# Patient Record
Sex: MALE | Race: WHITE | Employment: OTHER | URBAN - METROPOLITAN AREA
[De-identification: names, ages, dates, MRNs, and addresses within clinical notes are randomized per-mention and may not be internally consistent; named-entity substitution may affect disease eponyms.]

---

## 2017-01-05 ENCOUNTER — OFFICE VISIT (OUTPATIENT)
Dept: ORTHOPEDIC SURGERY | Age: 57
End: 2017-01-05

## 2017-01-05 VITALS — HEIGHT: 73 IN | WEIGHT: 240 LBS | BODY MASS INDEX: 31.81 KG/M2

## 2017-01-05 DIAGNOSIS — M50.20 CERVICAL DISC HERNIATION: ICD-10-CM

## 2017-01-05 DIAGNOSIS — M25.511 ACUTE PAIN OF RIGHT SHOULDER: ICD-10-CM

## 2017-01-05 DIAGNOSIS — M50.123 CERVICAL DISC DISORDER AT C6-C7 LEVEL WITH RADICULOPATHY: ICD-10-CM

## 2017-01-05 DIAGNOSIS — S16.1XXA CERVICAL STRAIN, ACUTE, INITIAL ENCOUNTER: ICD-10-CM

## 2017-01-05 PROCEDURE — 99213 OFFICE O/P EST LOW 20 MIN: CPT | Performed by: INTERNAL MEDICINE

## 2017-01-05 RX ORDER — GABAPENTIN 300 MG/1
CAPSULE ORAL
Qty: 60 CAPSULE | Refills: 3 | Status: SHIPPED | OUTPATIENT
Start: 2017-01-05 | End: 2017-02-16 | Stop reason: SDUPTHER

## 2017-01-05 RX ORDER — TRAMADOL HYDROCHLORIDE 50 MG/1
TABLET ORAL
Qty: 50 TABLET | Refills: 0 | Status: ON HOLD | OUTPATIENT
Start: 2017-01-05 | End: 2018-01-17 | Stop reason: HOSPADM

## 2017-01-26 ENCOUNTER — OFFICE VISIT (OUTPATIENT)
Dept: ORTHOPEDIC SURGERY | Age: 57
End: 2017-01-26

## 2017-01-26 VITALS — WEIGHT: 240 LBS | HEIGHT: 73 IN | BODY MASS INDEX: 31.81 KG/M2

## 2017-01-26 DIAGNOSIS — S16.1XXA CERVICAL STRAIN, ACUTE, INITIAL ENCOUNTER: ICD-10-CM

## 2017-01-26 DIAGNOSIS — M50.20 CERVICAL DISC HERNIATION: ICD-10-CM

## 2017-01-26 DIAGNOSIS — M50.123 CERVICAL DISC DISORDER AT C6-C7 LEVEL WITH RADICULOPATHY: ICD-10-CM

## 2017-01-26 DIAGNOSIS — M25.511 ACUTE PAIN OF RIGHT SHOULDER: ICD-10-CM

## 2017-01-26 DIAGNOSIS — S46.911A RIGHT SHOULDER STRAIN, INITIAL ENCOUNTER: ICD-10-CM

## 2017-01-26 PROCEDURE — 99213 OFFICE O/P EST LOW 20 MIN: CPT | Performed by: INTERNAL MEDICINE

## 2017-01-26 RX ORDER — TIZANIDINE 4 MG/1
4 TABLET ORAL 3 TIMES DAILY PRN
Qty: 30 TABLET | Refills: 1 | Status: ON HOLD | OUTPATIENT
Start: 2017-01-26 | End: 2018-01-17 | Stop reason: HOSPADM

## 2017-01-26 RX ORDER — MELOXICAM 15 MG/1
15 TABLET ORAL DAILY
Qty: 30 TABLET | Refills: 1 | Status: SHIPPED | OUTPATIENT
Start: 2017-01-26 | End: 2017-06-29 | Stop reason: SDUPTHER

## 2017-01-30 ENCOUNTER — TELEPHONE (OUTPATIENT)
Dept: ORTHOPEDIC SURGERY | Age: 57
End: 2017-01-30

## 2017-02-06 ENCOUNTER — TELEPHONE (OUTPATIENT)
Dept: ORTHOPEDIC SURGERY | Age: 57
End: 2017-02-06

## 2017-02-16 ENCOUNTER — OFFICE VISIT (OUTPATIENT)
Dept: ORTHOPEDIC SURGERY | Age: 57
End: 2017-02-16

## 2017-02-16 VITALS — HEIGHT: 72 IN | WEIGHT: 240 LBS | BODY MASS INDEX: 32.51 KG/M2

## 2017-02-16 DIAGNOSIS — S46.911D RIGHT SHOULDER STRAIN, SUBSEQUENT ENCOUNTER: ICD-10-CM

## 2017-02-16 DIAGNOSIS — M50.123 CERVICAL DISC DISORDER AT C6-C7 LEVEL WITH RADICULOPATHY: ICD-10-CM

## 2017-02-16 DIAGNOSIS — S16.1XXD CERVICAL STRAIN, ACUTE, SUBSEQUENT ENCOUNTER: Primary | ICD-10-CM

## 2017-02-16 DIAGNOSIS — M50.20 CERVICAL DISC HERNIATION: ICD-10-CM

## 2017-02-16 PROCEDURE — 99213 OFFICE O/P EST LOW 20 MIN: CPT | Performed by: INTERNAL MEDICINE

## 2017-02-16 RX ORDER — GABAPENTIN 300 MG/1
CAPSULE ORAL
Qty: 30 CAPSULE | Refills: 2 | Status: SHIPPED | OUTPATIENT
Start: 2017-02-16 | End: 2017-05-11 | Stop reason: SDUPTHER

## 2017-02-22 ENCOUNTER — OFFICE VISIT (OUTPATIENT)
Dept: ORTHOPEDIC SURGERY | Age: 57
End: 2017-02-22

## 2017-02-22 VITALS
SYSTOLIC BLOOD PRESSURE: 132 MMHG | BODY MASS INDEX: 32.51 KG/M2 | HEIGHT: 72 IN | HEART RATE: 68 BPM | DIASTOLIC BLOOD PRESSURE: 75 MMHG | WEIGHT: 240 LBS

## 2017-02-22 DIAGNOSIS — M17.12 ARTHRITIS OF LEFT KNEE: Primary | ICD-10-CM

## 2017-02-22 PROCEDURE — 99213 OFFICE O/P EST LOW 20 MIN: CPT | Performed by: ORTHOPAEDIC SURGERY

## 2017-03-16 ENCOUNTER — OFFICE VISIT (OUTPATIENT)
Dept: ORTHOPEDIC SURGERY | Age: 57
End: 2017-03-16

## 2017-03-16 ENCOUNTER — TELEPHONE (OUTPATIENT)
Dept: ORTHOPEDIC SURGERY | Age: 57
End: 2017-03-16

## 2017-03-16 VITALS
BODY MASS INDEX: 32.51 KG/M2 | SYSTOLIC BLOOD PRESSURE: 130 MMHG | DIASTOLIC BLOOD PRESSURE: 78 MMHG | WEIGHT: 240 LBS | HEART RATE: 85 BPM | HEIGHT: 72 IN

## 2017-03-16 VITALS — WEIGHT: 240.08 LBS | HEIGHT: 72 IN | BODY MASS INDEX: 32.52 KG/M2

## 2017-03-16 DIAGNOSIS — S16.1XXD CERVICAL STRAIN, ACUTE, SUBSEQUENT ENCOUNTER: Primary | ICD-10-CM

## 2017-03-16 DIAGNOSIS — M17.12 PRIMARY OSTEOARTHRITIS OF LEFT KNEE: Primary | ICD-10-CM

## 2017-03-16 DIAGNOSIS — S46.911D RIGHT SHOULDER STRAIN, SUBSEQUENT ENCOUNTER: ICD-10-CM

## 2017-03-16 PROCEDURE — 20610 DRAIN/INJ JOINT/BURSA W/O US: CPT | Performed by: PHYSICIAN ASSISTANT

## 2017-03-16 PROCEDURE — 99213 OFFICE O/P EST LOW 20 MIN: CPT | Performed by: INTERNAL MEDICINE

## 2017-03-23 ENCOUNTER — OFFICE VISIT (OUTPATIENT)
Dept: ORTHOPEDIC SURGERY | Age: 57
End: 2017-03-23

## 2017-03-23 VITALS — WEIGHT: 240 LBS | BODY MASS INDEX: 32.51 KG/M2 | HEIGHT: 72 IN

## 2017-03-23 DIAGNOSIS — M17.12 PRIMARY OSTEOARTHRITIS OF LEFT KNEE: Primary | ICD-10-CM

## 2017-03-23 PROCEDURE — 20610 DRAIN/INJ JOINT/BURSA W/O US: CPT | Performed by: PHYSICIAN ASSISTANT

## 2017-03-30 ENCOUNTER — OFFICE VISIT (OUTPATIENT)
Dept: ORTHOPEDIC SURGERY | Age: 57
End: 2017-03-30

## 2017-03-30 VITALS — BODY MASS INDEX: 32.51 KG/M2 | WEIGHT: 240 LBS | HEIGHT: 72 IN

## 2017-03-30 DIAGNOSIS — M17.12 PRIMARY OSTEOARTHRITIS OF LEFT KNEE: Primary | ICD-10-CM

## 2017-03-30 PROCEDURE — 20610 DRAIN/INJ JOINT/BURSA W/O US: CPT | Performed by: PHYSICIAN ASSISTANT

## 2017-04-13 ENCOUNTER — OFFICE VISIT (OUTPATIENT)
Dept: ORTHOPEDIC SURGERY | Age: 57
End: 2017-04-13

## 2017-04-13 VITALS — BODY MASS INDEX: 32.52 KG/M2 | HEIGHT: 72 IN | WEIGHT: 240.08 LBS

## 2017-04-13 DIAGNOSIS — M50.20 CERVICAL DISC HERNIATION: ICD-10-CM

## 2017-04-13 DIAGNOSIS — M50.123 CERVICAL DISC DISORDER AT C6-C7 LEVEL WITH RADICULOPATHY: ICD-10-CM

## 2017-04-13 DIAGNOSIS — S16.1XXD CERVICAL STRAIN, ACUTE, SUBSEQUENT ENCOUNTER: Primary | ICD-10-CM

## 2017-04-13 PROCEDURE — 99213 OFFICE O/P EST LOW 20 MIN: CPT | Performed by: INTERNAL MEDICINE

## 2017-05-11 ENCOUNTER — TELEPHONE (OUTPATIENT)
Dept: ORTHOPEDIC SURGERY | Age: 57
End: 2017-05-11

## 2017-05-11 ENCOUNTER — OFFICE VISIT (OUTPATIENT)
Dept: ORTHOPEDIC SURGERY | Age: 57
End: 2017-05-11

## 2017-05-11 VITALS
HEART RATE: 91 BPM | HEIGHT: 72 IN | WEIGHT: 240.08 LBS | SYSTOLIC BLOOD PRESSURE: 135 MMHG | BODY MASS INDEX: 32.52 KG/M2 | DIASTOLIC BLOOD PRESSURE: 86 MMHG

## 2017-05-11 DIAGNOSIS — S16.1XXD CERVICAL STRAIN, ACUTE, SUBSEQUENT ENCOUNTER: ICD-10-CM

## 2017-05-11 DIAGNOSIS — M50.123 CERVICAL DISC DISORDER AT C6-C7 LEVEL WITH RADICULOPATHY: Primary | ICD-10-CM

## 2017-05-11 DIAGNOSIS — M50.20 CERVICAL DISC HERNIATION: ICD-10-CM

## 2017-05-11 PROCEDURE — 99213 OFFICE O/P EST LOW 20 MIN: CPT | Performed by: INTERNAL MEDICINE

## 2017-05-11 RX ORDER — GABAPENTIN 300 MG/1
300 CAPSULE ORAL 2 TIMES DAILY
Qty: 60 CAPSULE | Refills: 2 | Status: ON HOLD | OUTPATIENT
Start: 2017-05-11 | End: 2018-02-26

## 2017-05-12 ENCOUNTER — OFFICE VISIT (OUTPATIENT)
Dept: ORTHOPEDIC SURGERY | Age: 57
End: 2017-05-12

## 2017-05-12 VITALS
HEIGHT: 73 IN | SYSTOLIC BLOOD PRESSURE: 134 MMHG | DIASTOLIC BLOOD PRESSURE: 76 MMHG | BODY MASS INDEX: 32.47 KG/M2 | HEART RATE: 88 BPM | WEIGHT: 245 LBS

## 2017-05-12 DIAGNOSIS — M17.12 PRIMARY OSTEOARTHRITIS OF LEFT KNEE: Primary | ICD-10-CM

## 2017-05-12 PROCEDURE — 99213 OFFICE O/P EST LOW 20 MIN: CPT | Performed by: PHYSICIAN ASSISTANT

## 2017-05-16 ENCOUNTER — TELEPHONE (OUTPATIENT)
Dept: ORTHOPEDIC SURGERY | Age: 57
End: 2017-05-16

## 2017-06-01 ENCOUNTER — OFFICE VISIT (OUTPATIENT)
Dept: ORTHOPEDIC SURGERY | Age: 57
End: 2017-06-01

## 2017-06-01 VITALS
HEIGHT: 73 IN | WEIGHT: 244.93 LBS | SYSTOLIC BLOOD PRESSURE: 134 MMHG | BODY MASS INDEX: 32.46 KG/M2 | HEART RATE: 82 BPM | DIASTOLIC BLOOD PRESSURE: 76 MMHG

## 2017-06-01 DIAGNOSIS — M50.123 CERVICAL DISC DISORDER AT C6-C7 LEVEL WITH RADICULOPATHY: ICD-10-CM

## 2017-06-01 DIAGNOSIS — M50.20 CERVICAL DISC HERNIATION: Primary | ICD-10-CM

## 2017-06-01 PROCEDURE — 99213 OFFICE O/P EST LOW 20 MIN: CPT | Performed by: INTERNAL MEDICINE

## 2017-06-15 ENCOUNTER — TELEPHONE (OUTPATIENT)
Dept: ORTHOPEDIC SURGERY | Age: 57
End: 2017-06-15

## 2017-06-29 ENCOUNTER — OFFICE VISIT (OUTPATIENT)
Dept: ORTHOPEDIC SURGERY | Age: 57
End: 2017-06-29

## 2017-06-29 VITALS — WEIGHT: 244 LBS | RESPIRATION RATE: 15 BRPM | BODY MASS INDEX: 33.05 KG/M2 | HEIGHT: 72 IN

## 2017-06-29 DIAGNOSIS — S46.911A RIGHT SHOULDER STRAIN, INITIAL ENCOUNTER: ICD-10-CM

## 2017-06-29 DIAGNOSIS — S16.1XXA CERVICAL STRAIN, ACUTE, INITIAL ENCOUNTER: ICD-10-CM

## 2017-06-29 DIAGNOSIS — M25.511 ACUTE PAIN OF RIGHT SHOULDER: ICD-10-CM

## 2017-06-29 DIAGNOSIS — M50.123 CERVICAL DISC DISORDER AT C6-C7 LEVEL WITH RADICULOPATHY: ICD-10-CM

## 2017-06-29 DIAGNOSIS — M50.20 CERVICAL DISC HERNIATION: Primary | ICD-10-CM

## 2017-06-29 PROCEDURE — 99213 OFFICE O/P EST LOW 20 MIN: CPT | Performed by: INTERNAL MEDICINE

## 2017-06-29 RX ORDER — MELOXICAM 15 MG/1
15 TABLET ORAL DAILY
Qty: 30 TABLET | Refills: 1 | Status: SHIPPED | OUTPATIENT
Start: 2017-06-29 | End: 2017-06-29 | Stop reason: SDUPTHER

## 2017-06-29 RX ORDER — MELOXICAM 15 MG/1
15 TABLET ORAL DAILY
Qty: 30 TABLET | Refills: 3 | Status: SHIPPED | OUTPATIENT
Start: 2017-06-29 | End: 2018-03-01

## 2017-07-27 ENCOUNTER — OFFICE VISIT (OUTPATIENT)
Dept: ORTHOPEDIC SURGERY | Age: 57
End: 2017-07-27

## 2017-07-27 VITALS
HEIGHT: 72 IN | BODY MASS INDEX: 33.05 KG/M2 | SYSTOLIC BLOOD PRESSURE: 135 MMHG | HEART RATE: 77 BPM | WEIGHT: 244 LBS | DIASTOLIC BLOOD PRESSURE: 89 MMHG

## 2017-07-27 DIAGNOSIS — M50.123 CERVICAL DISC DISORDER AT C6-C7 LEVEL WITH RADICULOPATHY: Primary | ICD-10-CM

## 2017-07-27 DIAGNOSIS — M50.20 CERVICAL DISC HERNIATION: ICD-10-CM

## 2017-07-27 DIAGNOSIS — S16.1XXD CERVICAL STRAIN, ACUTE, SUBSEQUENT ENCOUNTER: ICD-10-CM

## 2017-07-27 PROCEDURE — 99213 OFFICE O/P EST LOW 20 MIN: CPT | Performed by: INTERNAL MEDICINE

## 2017-07-28 ENCOUNTER — HOSPITAL ENCOUNTER (OUTPATIENT)
Dept: OTHER | Age: 57
Discharge: HOME | End: 2017-07-28
Attending: UROLOGY | Admitting: UROLOGY

## 2017-07-28 DIAGNOSIS — N20.0 KIDNEY STONES: ICD-10-CM

## 2017-08-25 ENCOUNTER — OFFICE VISIT (OUTPATIENT)
Dept: ORTHOPEDIC SURGERY | Age: 57
End: 2017-08-25

## 2017-08-25 VITALS — WEIGHT: 244 LBS | BODY MASS INDEX: 33.05 KG/M2 | HEIGHT: 72 IN

## 2017-08-25 DIAGNOSIS — M17.12 PRIMARY OSTEOARTHRITIS OF LEFT KNEE: Primary | ICD-10-CM

## 2017-08-25 PROCEDURE — 99213 OFFICE O/P EST LOW 20 MIN: CPT | Performed by: PHYSICIAN ASSISTANT

## 2017-10-05 ENCOUNTER — OFFICE VISIT (OUTPATIENT)
Dept: ORTHOPEDIC SURGERY | Age: 57
End: 2017-10-05

## 2017-10-05 VITALS — BODY MASS INDEX: 33.05 KG/M2 | HEIGHT: 72 IN | WEIGHT: 244 LBS

## 2017-10-05 DIAGNOSIS — M17.12 PRIMARY OSTEOARTHRITIS OF LEFT KNEE: Primary | ICD-10-CM

## 2017-10-05 PROCEDURE — 20610 DRAIN/INJ JOINT/BURSA W/O US: CPT | Performed by: PHYSICIAN ASSISTANT

## 2017-10-05 NOTE — MR AVS SNAPSHOT
After Visit Summary             Lisa Dunne   10/5/2017 2:15 PM   Office Visit    Description:  Male : 1960   Provider:  Duane Oden PA-C   Department:  [facility]              Your Follow-Up and Future Appointments         Below is a list of your follow-up and future appointments. This may not be a complete list as you may have made appointments directly with providers that we are not aware of or your providers may have made some for you. Please call your providers to confirm appointments. It is important to keep your appointments. Please bring your current insurance card, photo ID, co-pay, and all medication bottles to your appointment. If self-pay, payment is expected at the time of service. Your To-Do List     Future Appointments Provider Department Dept Phone    10/12/2017 1:15 PM Juan Francisco Mccray [facility] [phone]    10/19/2017 3:15 PM Duane Oden PA-C [facility] [phone]    Follow-Up    Return in about 1 week (around 10/12/2017). Information from Your Visit        Department     Name Address Phone Fax    [facility] [phone]      You Were Seen for:         Comments    Primary osteoarthritis of left knee   [016346]         Vital Signs     Height Weight Body Mass Index Smoking Status          6' (1.829 m) 244 lb (110.7 kg) 33.09 kg/m2 Never Smoker        Additional Information about your Body Mass Index (BMI)           Your BMI as listed above is considered obese (30 or more). BMI is an estimate of body fat, calculated from your height and weight. The higher your BMI, the greater your risk of heart disease, high blood pressure, type 2 diabetes, stroke, gallstones, arthritis, sleep apnea, and certain cancers. BMI is not perfect.   It may overestimate body fat in athletes and people who are more muscular. Even a small weight loss (between 5 and 10 percent of your current weight) by decreasing your calorie intake and becoming more physically active will help lower your risk of developing or worsening diseases associated with obesity. Learn more at: [employer].comco.uk             Medications and Orders      Your Current Medications Are              meloxicam (MOBIC) 15 MG tablet Take 1 tablet by mouth daily    gabapentin (NEURONTIN) 300 MG capsule Take 1 capsule by mouth 2 times daily    tiZANidine (ZANAFLEX) 4 MG tablet Take 1 tablet by mouth 3 times daily as needed (Muscle tightness/spasm)    traMADol (ULTRAM) 50 MG tablet Take 1 tablet every 4-6 hours for moderate to severe pain    methylPREDNISolone (MEDROL, KIAH,) 4 MG tablet By mouth.   As needed for flare of neck pain/arm pain    predniSONE (DELTASONE) 10 MG tablet Takes 6 tablets for 3 days then 4 tablets for 3 days then 2 tablets for 3 days then 1 tablet for 3 days    ondansetron (ZOFRAN) 4 MG tablet Take 1 tablet by mouth every 8 hours as needed for Nausea or Vomiting    docusate sodium (COLACE) 100 MG capsule Take 1 capsule by mouth 2 times daily as needed for Constipation    pantoprazole (PROTONIX) 40 MG tablet Take 1 tablet by mouth daily    amLODIPine (NORVASC) 5 MG tablet Take 10 mg by mouth daily       Allergies           No Known Allergies      We Ordered/Performed the following           EUFLEXXA INJ PER DOSE     ID ARTHROCENTESIS ASPIR&/INJ MAJOR JT/BURSA W/O US          Additional Information        Basic Information     Date Of Birth Sex Race Ethnicity Preferred Language    1960 Male White Non-/Non  English      Problem List as of 10/5/2017  Date Reviewed: 10/5/2017                Cervical disc herniation    Cervical disc disorder at C6-C7 level with radiculopathy    Cervical strain, acute    Postop check    Gallstones    Essential hypertension

## 2017-10-05 NOTE — PROGRESS NOTES
Subjective    Patient ID: Tobin Leal is a 56 y.o..  male. Chief Complaint   Patient presents with    Knee Pain     LEFT      [facility]   # 1 EUFLEXXA       Patient presents today for the 1st injection of Euflexxa . Pt has been previously diagnosed with osteoarthritis of the knee as documented in the prior progress notes. This injection is for the patients Left knee. Patient denies and fevers, chills, recent infections, or new injuries to the knee. Pain Assessment:       Patient's medications, allergies, past medical, surgical, social and family histories were reviewed and updated as appropriate. Physical Exam:    The patient does have  pain on motion, there is not an effusion, there is tenderness over the  medial, lateral region. No erythema noted. Sensation intact to touch. Pulses present distally. Procedure Note:    The patients Left knee was initially prepped using Betadine swab. The superior lateral aspect of the knee was prepped and used for this injection. Under aseptic technique the soft tissues were anesthetized using 1% Lidocaine without epinephrine. A total of 2ml of Lidocaine was injected into the soft tissues leading up to the joint capsule. Following adequate anesthesia, the 2ml of Euflexxa injection was performed. This was injected directly into the joint capsule of the knee. No complications were encountered and the patient tolerated the procedure well. A band aid was placed over the injection site following the procedure.       IN ARTHROCENTESIS ASPIR&/INJ MAJOR JT/BURSA W/O US  EUFLEXXA INJ PER DOSE  Assessment:  1) Euflexxa  injection of the Left knee  2) Osteoarthritis    Plan:  1) ice, elevation, gentle range of motion as needed for swelling or stiffness of the knee  2) NSAIDs for any pain after injection   3) F/U 1wk for 2nd injection

## 2017-10-05 NOTE — PROGRESS NOTES
10/5/17 2:18 PM     Site & comments:   LEFT KNEE    NDC: [address]-1    Lot number: [id number]    Product:  Malcolm Choi

## 2017-10-12 ENCOUNTER — OFFICE VISIT (OUTPATIENT)
Dept: ORTHOPEDIC SURGERY | Age: 57
End: 2017-10-12

## 2017-10-12 VITALS — WEIGHT: 244 LBS | BODY MASS INDEX: 33.05 KG/M2 | HEIGHT: 72 IN

## 2017-10-12 DIAGNOSIS — M17.12 PRIMARY OSTEOARTHRITIS OF LEFT KNEE: Primary | ICD-10-CM

## 2017-10-12 PROCEDURE — 20610 DRAIN/INJ JOINT/BURSA W/O US: CPT | Performed by: PHYSICIAN ASSISTANT

## 2017-10-12 NOTE — PROGRESS NOTES
Subjective    Patient ID: Anais Mon is a 56 y.o..  male. Chief Complaint   Patient presents with    Knee Pain     LEFT      # 2 EUFLEXXA       Patient presents today for the 2nd injection of Euflexxa . Pt has been previously diagnosed with osteoarthritis of the knee as documented in the prior progress notes. This injection is for the patients Left knee. Patient denies and fevers, chills, recent infections, or new injuries to the knee. Pain Assessment:       Patient's medications, allergies, past medical, surgical, social and family histories were reviewed and updated as appropriate. Physical Exam:    The patient does have  pain on motion, there is not an effusion, there is tenderness over the  medial, lateral region. No erythema noted. Sensation intact to touch. Pulses present distally. Procedure Note:    The patients Left knee was initially prepped using Betadine swab. The superior lateral aspect of the knee was prepped and used for this injection. Under aseptic technique the soft tissues were anesthetized using 1% Lidocaine without epinephrine. A total of 2ml of Lidocaine was injected into the soft tissues leading up to the joint capsule. Following adequate anesthesia, the 2ml of Euflexxa injection was performed. This was injected directly into the joint capsule of the knee. No complications were encountered and the patient tolerated the procedure well. A band aid was placed over the injection site following the procedure.       ID ARTHROCENTESIS ASPIR&/INJ MAJOR JT/BURSA W/O US  EUFLEXXA INJ PER DOSE  Assessment:  1) Euflexxa  injection of the Left knee  2) Osteoarthritis    Plan:  1) ice, elevation, gentle range of motion as needed for swelling or stiffness of the knee  2) NSAIDs for any pain after injection   3) F/U 1wk for 3rd injection

## 2017-10-19 ENCOUNTER — OFFICE VISIT (OUTPATIENT)
Dept: ORTHOPEDIC SURGERY | Age: 57
End: 2017-10-19

## 2017-10-19 VITALS — HEIGHT: 72 IN | BODY MASS INDEX: 33.05 KG/M2 | WEIGHT: 244 LBS

## 2017-10-19 DIAGNOSIS — M17.12 PRIMARY OSTEOARTHRITIS OF LEFT KNEE: Primary | ICD-10-CM

## 2017-10-19 PROCEDURE — 20610 DRAIN/INJ JOINT/BURSA W/O US: CPT | Performed by: PHYSICIAN ASSISTANT

## 2017-10-19 NOTE — PROGRESS NOTES
10/19/17 3:24 PM     Site & comments:   LEFT KNEE    NDC: [address]-5    Lot number: [id number]    Product:  Rosio Aguilar

## 2017-11-21 ENCOUNTER — TELEPHONE (OUTPATIENT)
Dept: ORTHOPEDIC SURGERY | Age: 57
End: 2017-11-21

## 2017-11-27 ENCOUNTER — OFFICE VISIT (OUTPATIENT)
Dept: ORTHOPEDIC SURGERY | Age: 57
End: 2017-11-27

## 2017-11-27 VITALS — HEIGHT: 72 IN | BODY MASS INDEX: 33.05 KG/M2 | WEIGHT: 244 LBS

## 2017-11-27 DIAGNOSIS — M25.562 LEFT KNEE PAIN, UNSPECIFIED CHRONICITY: Primary | ICD-10-CM

## 2017-11-27 DIAGNOSIS — M17.12 PRIMARY OSTEOARTHRITIS OF LEFT KNEE: ICD-10-CM

## 2017-11-27 DIAGNOSIS — M17.12 ARTHRITIS OF LEFT KNEE: ICD-10-CM

## 2017-11-27 PROCEDURE — 99214 OFFICE O/P EST MOD 30 MIN: CPT | Performed by: ORTHOPAEDIC SURGERY

## 2017-11-27 PROCEDURE — 73564 X-RAY EXAM KNEE 4 OR MORE: CPT | Performed by: ORTHOPAEDIC SURGERY

## 2017-11-27 NOTE — PROGRESS NOTES
adequately groomed with no evidence of malnutrition  Mental Status: The patient is oriented to time, place and person. The patient's mood and affect are appropriate. Vascular: Examination reveals no swelling or calf tenderness. Peripheral pulses are palpable and 2+. Neurological: The patient has good coordination. There is no weakness or sensory deficit. Left Knee Examination  Inspection:   No gross deformities noted. moderate swelling noted. No erythema or ecchymosis. Skin is intact. Palpation:  He has a moderate-sized effusion today. Continues to have tenderness to palpation both medial and lateral aspects of his knee. He has crepitus with range of motion behind his patella. Range of Motion:  His range of motion is limited secondary to his arthritic changes as well as his effusion. He lacks 15 of full extension he can flex today only to about 100. Strength:  His strength is essentially normal.  Probably could be improved and I don't think he uses his leg like normal.    Special Tests:  Ligamentous examination he is stable to varus and valgus stress negative Lachman's negative posterior drawer    Skin: There are no rashes, ulcerations or lesions. Gait: he walks with a significant antalgic gait primarily because he can't fully extend his knee due to his degenerative changes his osteoarthritis    Reflex: Normal    Ipsilateral Hip Exam:  Shows normal range of motion without pain. Distal neurologic and circulatory examination of the ipsilateral lower extremity appears intact without deficit    Additional Examinations:  Right Lower Extremity: Examination of the right lower extremity does not show any tenderness, deformity or injury. Range of motion is unremarkable. There is no gross instability. There are no rashes, ulcerations or lesions.   Strength and tone are normal.      XR KNEE LEFT STANDARD EXTENDED VW  Diagnostic Test Findings:  Xrays obtained and reviewed in office  4 views of the left knee show significant/severe degenerative osteoarthritis of his left knee with joint line collapse and tricompartmental degenerative changes      Assessment :  Severe left knee degenerative osteoarthritis secondary to industrial accident and approved osteoarthritis of the left knee by the bureau of workers compensation. Impression:  Encounter Diagnoses   Name Primary?  Left knee pain, unspecified chronicity Yes    Primary osteoarthritis of left knee     Arthritis of left knee        Office Procedures:  Orders Placed This Encounter   Procedures    XR KNEE LEFT (MIN 4 VIEWS)     [id number]     Order Specific Question:   Reason for exam:     Answer:   Pain       Treatment Plan:  After long discussion today and reviewed his treatment options. She has failed cortisone injection Visco supplementation or anti-inflammatories and previous physical therapy he also did not have any improvement in his knee pain with a several weeks of being off work secondary to his cervical spine surgery. The current plan is to proceed with total joint arthroplasty. Currently I am concerned about his ability to return to his previous occupation but the obvious treatment of choice currently is total knee arthroplasty. Today we reviewed with him the procedure risks and possible complications the postoperative protocol. After discussion today, the patient has elected to proceed with surgical  intervention. The surgical procedure was discussed in detail. The risks and  possible complications of the surgery in general, as well as those directly related  to this procedure were reviewed today. General risks include but are not limited  to persistent pain, infection, excessive blood loss, neurovascular injury, chronic  swelling or edema, and the potential need for additional treatment or surgical  intervention in the future.  The patient understands that, again, the risks and  possible complications are not limited to those specifically stated above. In  addition today, we discussed the preoperative and postoperative protocol, the often  lengthy recovery, and the expectation that the patient will be compliant with  instructions and perform the appropriate rehab program as prescribed. The patient  accepted the above risks, possible complications, and protocol and elects to  proceed. All questions were answered appropriately, and they understand that they  can contact the office at any time to further discuss any questions or concerns. This dictation was performed with a verbal recognition program (DRAGON) and it was checked for errors. It is possible that there are still dictated errors within this office note. If so, please bring any errors to my attention for an addendum. All efforts were made to ensure that this office note is accurate.

## 2017-11-28 NOTE — ADDENDUM NOTE
Encounter addended by:  Diana Godwin on: 11/28/2017 12:32 PM<BR>    Actions taken: Letter status changed

## 2017-11-28 NOTE — LETTER
[facility]  Surgery Precert & Billing Form:    DEMOGRAPHICS:                                                                                                       Patient Name:  Homa Pritchard  Patient :  1960   Patient SS#:      Patient Phone:  [phone] (home) [phone] (work) Alt.  Patient Phone:    Patient Address:  [facility] [address]    PCP:  Charmayne Rick, MD (Inactive)  Insurance: [facility]    DIAGNOSIS & PROCEDURE:                                                                                      Diagnosis: m17.12  Operation: left    SURGERY  INFORMATION  Date of Surgery:   [facility]  Location:    [facility]  Type:    Inpatient  23 hour hold:  No  Surgeon:         Rudy Milner MD  17     BILLING INFORMATION:                                                                                                Physician Procedure                                            CPT Codes                      PA, or Fellow Procedure                                      CPT Codes                      Precert information:

## 2017-12-04 ENCOUNTER — TELEPHONE (OUTPATIENT)
Dept: ORTHOPEDIC SURGERY | Age: 57
End: 2017-12-04

## 2017-12-18 ENCOUNTER — HOSPITAL ENCOUNTER (OUTPATIENT)
Dept: PHYSICAL THERAPY | Age: 57
Discharge: HOME | End: 2017-12-31
Admitting: NEUROLOGICAL SURGERY

## 2017-12-18 NOTE — FLOWSHEET NOTE
ROM, reducing/eliminating soft tissue swelling/inflammation/restriction, improving soft tissue extensibility and allowing for proper ROM for normal function with self care, reaching, carrying, lifting, house/yardwork, driving/computer work    Modalities:  PM/MHP to c-spine, CP to R shoulder long sitting 15 min    Charges:  Timed Code Treatment Minutes: 23   Total Treatment Minutes: 60     [x] EVAL (LOW) 06718 (typically 20 minutes face-to-face) (8:10-8:33)  [] EVAL (MOD) 92203 (typically 30 minutes face-to-face)  [] EVAL (HIGH) 72749 (typically 45 minutes face-to-face)  [] RE-EVAL     [x] GM(76798) x  2 (8:30-8:53)  [] IONTO  [] NMR (29338) x      [] VASO  [] Manual (87163) x       [] Other:  [] TA x       [] Mech Traction (98134)  [] ES(attended) (09228)      [x] ES (un) (79066):  (8:55-9:10)    GOALS:  Patient stated goal: Have no pain     Therapist goals for Patient:   Short Term Goals: To be achieved in: 2 weeks  1. Independent in HEP and progression per patient tolerance, in order to prevent re-injury. 2. Patient will have a decrease in pain to facilitate improvement in movement, function, and ADLs as indicated by Functional Deficits.     Long Term Goals: To be achieved in: 6 weeks  1. Disability index score of 17% or less for the NDI to assist with reaching prior level of function. 2. Patient will demonstrate increased AROM to 70 deg L rotation and 30 deg lateral flexion bilaterally of cervical/thoracic spine to allow for proper joint functioning as indicated by patients Functional Deficits. 3. Patient will demonstrate an increase in postural awareness and control and activation of  Deep cervical stabilizers to allow for proper functional mobility as indicated by patients Functional Deficits. 4. Patient will return to sleeping through the night and dressing without increased symptoms or restriction.    5. Return to weight lifting without any increased symptoms or restriction (patient specific functional goal)                 Progression Towards Functional goals:  [] Patient is progressing as expected towards functional goals listed. [] Progression is slowed due to complexities listed. [] Progression has been slowed due to co-morbidities.   [x] Plan just implemented, too soon to assess goals progression  [] Other:     ASSESSMENT:  See eval    Treatment/Activity Tolerance:  [x] Patient tolerated treatment well [] Patient limited by fatique  [] Patient limited by pain  [] Patient limited by other medical complications  [] Other:     Prognosis: [x] Good [] Fair  [] Poor    Patient Requires Follow-up: [x] Yes  [] No    PLAN: See eval  [] Continue per plan of care [] Alter current plan (see comments)  [x] Plan of care initiated [] Hold pending MD visit [] Discharge    Electronically signed by: Ally Adame, PT,DPT [id number]

## 2017-12-18 NOTE — PLAN OF CARE
[facility], [address]  Phone: [phone]  Fax [phone]   Physical Therapy Certification    Dear Referring Practitioner: Dr. Willard Kelley,    We had the pleasure of evaluating the following patient for physical therapy services at [address] [place] [address]. A summary of our findings can be found in the initial assessment below. This includes our plan of care. If you have any questions or concerns regarding these findings, please do not hesitate to contact me at the office phone number checked above. Thank you for the referral.       Physician Signature:_______________________________Date:__________________  By signing above (or electronic signature), therapists plan is approved by physician    Patient: Bashir Miller   : 1960   MRN: [id number]  Referring Physician: Referring Practitioner: Dr. Willard Kelley      Evaluation Date: 2017      Medical Diagnosis Information:  Diagnosis: Cervical disc disorder w/ radiculopathy (M50.123)   Treatment Diagnosis: R shoulder stiffness (M25.611); Cervical pain (M54.2)                                         Insurance information: PT Insurance Information: [facility]    Precautions/ Contra-indications: None; pt is getting knee replacement on LLE on   Latex Allergy:  [x]NO      []YES  Preferred Language for Healthcare:   [x]English       []other:    SUBJECTIVE: Patient stated complaint: Pt states he had surgery in August  (ACDF) due to lifting heavy cases of soda syrup and had herniated discs. States his right hand goes numb when his hand is up towards his ear or when he lays on his right shoulder. Right shoulder is painful to lay on. States his neck has been stiff but is getting better. No lifting over 15 pounds currently, tender with lifting arm. Pain wakes him up a few times a night, has been sleeping in recliner for last year.   Is having a knee replacement in January. Relevant Medical History: ACDF surgery in July; 15# weight lifting restriction  Functional Disability Index: PT G-Codes  Functional Assessment Tool Used: NDI  Score: 34%  Functional Limitation: Changing and maintaining body position  Changing and Maintaining Body Position Current Status (): At least 20 percent but less than 40 percent impaired, limited or restricted  Changing and Maintaining Body Position Goal Status (): At least 1 percent but less than 20 percent impaired, limited or restricted    Pain Scale: 0-4/10  Easing factors: neurotin, steroids  Provocative factors: Reaching behind back, reaching up, sleeping, lifting    Type: []Constant   [x]Intermittent  []Radiating []Localized []other:      Numbness/Tingling: Yes right UE    Occupation/School: Retired, drove a truck    Living Status/Prior Level of Function: Independent with ADLs and IADLs    OBJECTIVE:     CERV ROM     Cervical Flexion 22    Cervical Extension 25    Cervical SB 18 R 10 L   Cervical rotation 75 R 49 L        ROM Left Right   Shoulder Flex 160 130   Shoulder Abd 150 139   Shoulder ER T3 T2   Shoulder IR T10 Level of greater troch; lacking extension             Strength  Left Right   Shoulder Flex 5 4-   Shoulder Scap 5 4-   Shoulder ER 5 4   Shoulder IR 5 4             Reflexes Left Right   C5-6 Biceps 2+ 2+   C5-6 Brachioradialis     C7-8 Triceps       Reflexes/Sensation:    [x]Dermatomes/Myotomes intact    []Reflexes equal and normal bilaterally   []Other:    Joint mobility:   []Normal    [x]Hypo   []Hyper    Palpation: Tender along right side of neck, tender with side glides on the R. Tender along entire anterior/lateral aspect of right shoulder and tight pec on R. Functional Mobility/Transfers: No limitations    Posture: Internally rotated shoulders , slightly forward head    Bandages/Dressings/Incisions: Healed incision from ACDF surgery    Gait: (include devices/WB status):  WNL PT G-Codes  Functional Assessment Tool Used: NDI  Score: 34%  Functional Limitation: Changing and maintaining body position  Changing and Maintaining Body Position Current Status (): At least 20 percent but less than 40 percent impaired, limited or restricted  Changing and Maintaining Body Position Goal Status (): At least 1 percent but less than 20 percent impaired, limited or restricted    ASSESSMENT:    Functional Impairments:     [x]Noted cervical/thoracic/GHJ joint hypomobility   []Noted cervical/thoracic/GHJ joint hypermobility   [x]Decreased cervical/UE functional ROM   []Noted Headache pain aggravated by neck movements with/without dizziness   []Abnormal reflexes/sensation/myotomal/dermatomal deficits   [x]Decreased DCF control or ability to hold head up   [x]Decreased RC/scapular/core strength and neuromuscular control    [x]Decreased UE functional strength   []other:      Functional Activity Limitations (from functional questionnaire and intake)   [x]Reduced ability to tolerate prolonged functional positions   []Reduced ability or difficulty with changes of positions or transfers between positions   [x]Reduced ability to maintain good posture and demonstrate good body mechanics with sitting, bending, and lifting   [x] Reduced ability or tolerance with driving and/or computer work   []Reduced ability to perform lifting, reaching, carrying tasks   []Reduced ability to concentrate   [x]Reduced ability to sleep    []Reduced ability to tolerate any impact through UE or spine   []Reduced ability to ambulate prolonged functional periods/distances   []other:    Participation Restrictions   []Reduced participation in self care activities   []Reduced participation in home management activities   [x]Reduced participation in work activities   [x]Reduced participation in social activities. []Reduced participation in sport/recreational activities.     Classification/Subgrouping:   [x]signs/symptoms consistent bilaterally of cervical/thoracic spine to allow for proper joint functioning as indicated by patients Functional Deficits. 3. Patient will demonstrate an increase in postural awareness and control and activation of  Deep cervical stabilizers to allow for proper functional mobility as indicated by patients Functional Deficits. 4. Patient will return to sleeping through the night and dressing without increased symptoms or restriction.    5. Return to weight lifting without any increased symptoms or restriction (patient specific functional goal)       Electronically signed by:  Demetrio Porter, PT,DPT [id number]

## 2017-12-20 ENCOUNTER — HOSPITAL ENCOUNTER (OUTPATIENT)
Dept: PHYSICAL THERAPY | Age: 57
Discharge: HOME | End: 2017-12-20
Admitting: NEUROLOGICAL SURGERY

## 2017-12-20 NOTE — FLOWSHEET NOTE
[facility],  [address]  Phone: [phone]  Fax [phone]      Physical Therapy Daily Treatment Note  Date:  2017    Patient Name:  Lisa Dunne    :  1960  MRN: [id number]  Restrictions/Precautions:    Medical/Treatment Diagnosis Information:  Diagnosis: Cervical disc disorder w/ radiculopathy (M50.123)  Treatment Diagnosis: R shoulder stiffness (M25.611); Cervical pain (R64.2)  Insurance/Certification information:  PT Insurance Information: [id number]  Physician Information:  Referring Practitioner: Dr. Juan A Solano of care signed (Y/N):     Date of Patient follow up with Physician:     G-Code (if applicable):      Date G-Code Applied:  17       Progress Note: [x]  Yes  []  No  Next due by: Visit #10      Latex Allergy:  [x]NO      []YES  Preferred Language for Healthcare:   [x]English       []other:    Visit # Insurance Allowable Requires auth   2 12 (between -)    []no        [x]yes:     Pain level:  3/10     SUBJECTIVE:  Patient states he was sore after first visit. Was able to do exercises well.     OBJECTIVE: See eval  Observation:   Test measurements:      RESTRICTIONS/PRECAUTIONS: No lifting >15 pounds; pt having L knee replaced by Dr. Shaun Prado on     Exercises/Interventions:   Therapeutic Ex Sets/sec Reps Notes   Chin tuck supine 5\" 10    UT stretch bilat 30\" 3    Single arm pec stretch 30\" 3    Scap squeeze 5\" 2 x 10    Towel SNAG for rotation bilat  10x ea    RTB Row 2  10 x 3\"    YTB No $ with chin tuck 3\" 10x    Finisher table chin tuck  -Ladder  -Arch from center bilaterally  10x ea                                              Manual Intervention      STM to c-spine, bilat UT and c-spine side glides, traction 13'                                         NMR re-education                                    Traction                      Therapeutic Exercise and NMR Progression is slowed due to complexities listed. [] Progression has been slowed due to co-morbidities. [x] Plan just implemented, too soon to assess goals progression  [] Other:     ASSESSMENT: Matilde Houston did well with added TE today and tolerated manual intervention well. Did have soreness after first session but pain scale ranking was lower than first day.     Treatment/Activity Tolerance:  [x] Patient tolerated treatment well [] Patient limited by fatique  [] Patient limited by pain  [] Patient limited by other medical complications  [] Other:     Prognosis: [x] Good [] Fair  [] Poor    Patient Requires Follow-up: [x] Yes  [] No    PLAN: Add to HEP as tolerated  [x] Continue per plan of care [] Alter current plan (see comments)  [] Plan of care initiated [] Hold pending MD visit [] Discharge    Electronically signed by: Gayla Khan, PT,DPT [id number]

## 2017-12-26 ENCOUNTER — HOSPITAL ENCOUNTER (OUTPATIENT)
Dept: PHYSICAL THERAPY | Age: 57
Discharge: HOME | End: 2017-12-26
Admitting: NEUROLOGICAL SURGERY

## 2017-12-26 ENCOUNTER — TELEPHONE (OUTPATIENT)
Dept: ORTHOPEDIC SURGERY | Age: 57
End: 2017-12-26

## 2017-12-26 NOTE — FLOWSHEET NOTE
[facility], [address] [place]  Phone: [phone]  Fax [phone]      Physical Therapy Daily Treatment Note  Date:  2017    Patient Name:  Karen Merritt    :  1960  MRN: [id number]  Restrictions/Precautions:    Medical/Treatment Diagnosis Information:  Diagnosis: Cervical disc disorder w/ radiculopathy (M50.123)  Treatment Diagnosis: R shoulder stiffness (M25.611); Cervical pain (Y47.3)  Insurance/Certification information:  PT Insurance Information: [facility]  Physician Information:  Referring Practitioner: Dr. Jonathon Stafford of care signed (Y/N):     Date of Patient follow up with Physician:     G-Code (if applicable):      Date G-Code Applied:  17       Progress Note: [x]  Yes  []  No  Next due by: Visit #10      Latex Allergy:  [x]NO      []YES  Preferred Language for Healthcare:   [x]English       []other:    Visit # Insurance Allowable Requires auth   3 12 (between -)    []no        [x]yes:     Pain level:  3/10     SUBJECTIVE:  Patient states he feels about the same. Thinks n/t in his hand is getting better when he talks on the phone but does still occur.     OBJECTIVE: See eval  Observation:   Test measurements:      RESTRICTIONS/PRECAUTIONS: No lifting >15 pounds; pt having L knee replaced by Dr. Aracelis Salcido on     Exercises/Interventions:   Therapeutic Ex Sets/sec Reps Notes   Chin tuck supine 5\" 10    UT stretch bilat 30\" 3    Single arm pec stretch 30\" 3 bilat   Scap squeeze 5\" 15    Towel SNAG for rotation bilat  10x ea    RTB Row 2  10 x 3\" +HEP   YTB No $ with chin tuck 3\" 2 x 10 +HEPVC for form   Finisher table chin tuck  -Ladder  -Arch from center bilaterally  10x ea    Ulnar nerve flossing  30x VC for stopping point   YTB ext 2 10                                  Manual Intervention      STM to c-spine, bilat UT and c-spine side glides, traction 15' NMR re-education                                    Traction                      Therapeutic Exercise and NMR EXR  [x] (11684) Provided verbal/tactile cueing for activities related to strengthening, flexibility, endurance, ROM  for improvements in cervical, postural, scapular, scapulothoracic and UE control with self care, reaching, carrying, lifting, house/yardwork, driving/computer work.    [] (22381) Provided verbal/tactile cueing for activities related to improving balance, coordination, kinesthetic sense, posture, motor skill, proprioception  to assist with cervical, scapular, scapulothoracic and UE control with self care, reaching, carrying, lifting, house/yardwork, driving/computer work. Therapeutic Activities:    [] (11347 or 97245) Provided verbal/tactile cueing for activities related to improving balance, coordination, kinesthetic sense, posture, motor skill, proprioception and motor activation to allow for proper function of cervical, scapular, scapulothoracic and UE control with self care, carrying, lifting, driving/computer work.      Home Exercise Program:    [x] (10292) Reviewed/Progressed HEP activities related to strengthening, flexibility, endurance, ROM of cervical, scapular, scapulothoracic and UE control with self care, reaching, carrying, lifting, house/yardwork, driving/computer work  [] (38400) Reviewed/Progressed HEP activities related to improving balance, coordination, kinesthetic sense, posture, motor skill, proprioception of cervical, scapular, scapulothoracic and UE control with self care, reaching, carrying, lifting, house/yardwork, driving/computer work      Manual Treatments:  PROM / STM / Oscillations-Mobs:  G-I, II, III, IV (PA's, Inf., Post.)  [x] (63964) Provided manual therapy to mobilize soft tissue/joints of cervical/CT, scapular GHJ and UE for the purpose of decreasing headache, modulating pain, promoting relaxation,  increasing ROM, reducing/eliminating soft tissue swelling/inflammation/restriction, improving soft tissue extensibility and allowing for proper ROM for normal function with self care, reaching, carrying, lifting, house/yardwork, driving/computer work    Modalities:  PM/MHP to c-spine,long sitting 15 min    Charges:  Timed Code Treatment Minutes: 45   Total Treatment Minutes: 60     [] EVAL (LOW) 48444 (typically 20 minutes face-to-face)  [] EVAL (MOD) 31717 (typically 30 minutes face-to-face)  [] EVAL (HIGH) 86366 (typically 45 minutes face-to-face)  [] RE-EVAL     [x] RJ(36674) x  2 (4:40-5:15a)  [] IONTO  [] NMR (14605) x      [] VASO  [x] Manual (21051) x  1   (4:25-4:40) [] Other:  [] TA x       [] Mech Traction (63310)  [] ES(attended) (85722)      [x] ES (un) (96155):  (5:15-5:30:)    GOALS:  Patient stated goal: Have no pain      Therapist goals for Patient:   Short Term Goals: To be achieved in: 2 weeks  1. Independent in HEP and progression per patient tolerance, in order to prevent re-injury. 2. Patient will have a decrease in pain to facilitate improvement in movement, function, and ADLs as indicated by Functional Deficits.     Long Term Goals: To be achieved in: 6 weeks  1. Disability index score of 17% or less for the NDI to assist with reaching prior level of function. 2. Patient will demonstrate increased AROM to 70 deg L rotation and 30 deg lateral flexion bilaterally of cervical/thoracic spine to allow for proper joint functioning as indicated by patients Functional Deficits. 3. Patient will demonstrate an increase in postural awareness and control and activation of  Deep cervical stabilizers to allow for proper functional mobility as indicated by patients Functional Deficits. 4. Patient will return to sleeping through the night and dressing without increased symptoms or restriction.    5. Return to weight lifting without any increased symptoms or restriction (patient specific functional goal)                 Progression Towards

## 2017-12-29 ENCOUNTER — HOSPITAL ENCOUNTER (OUTPATIENT)
Dept: PHYSICAL THERAPY | Age: 57
Discharge: HOME | End: 2017-12-29
Admitting: NEUROLOGICAL SURGERY

## 2017-12-29 NOTE — FLOWSHEET NOTE
[facility], [address] [place]  Phone: [phone]  Fax [phone]      Physical Therapy Daily Treatment Note  Date:  2017    Patient Name:  Shorty Hannon    :  1960  MRN: [id number]  Restrictions/Precautions:    Medical/Treatment Diagnosis Information:  Diagnosis: Cervical disc disorder w/ radiculopathy (M50.123)  Treatment Diagnosis: R shoulder stiffness (M25.611); Cervical pain (B97.7)  Insurance/Certification information:  PT Insurance Information: [facility]  Physician Information:  Referring Practitioner: Dr. Brit Powers of care signed (Y/N):     Date of Patient follow up with Physician:     G-Code (if applicable):      Date G-Code Applied:  17       Progress Note: [x]  Yes  []  No  Next due by: Visit #10      Latex Allergy:  [x]NO      []YES  Preferred Language for Healthcare:   [x]English       []other:    Visit # Insurance Allowable Requires auth   4 12 (between -)    []no        [x]yes:     Pain level:  3/10     SUBJECTIVE: Patient states that he feels that his neck is hurting much less, most soreness is in his shoulder now.     OBJECTIVE: See eval  Observation:   Test measurements:      RESTRICTIONS/PRECAUTIONS: No lifting >15 pounds; pt having L knee replaced by Dr. Yadi Becerra on     Exercises/Interventions:   Therapeutic Ex Sets/sec Reps Notes   Chin tuck supine 5\" 15    Supine chin tuck w/ BUE flex  10x    UT stretch bilat 30\" 3    Levator stretch R only 30\" 3    Single arm pec stretch 30\" 3 bilat   Scap squeeze 5\" 15    Towel SNAG for rotation bilat  10x ea    RTB Row  RTB ext 2  10 x 3\" EA +HEP   YTB No $ with chin tuck 3\" 2 x 10 +HEPVC for form   Finisher table chin tuck  -Ladder  -Arch from center bilaterally  10x ea    Ulnar nerve flossing  30x VC for stopping point   YTB ext 2 10    Wall pushuip  20x                            Manual Intervention      STM to R paraspinals, promoting relaxation,  increasing ROM, reducing/eliminating soft tissue swelling/inflammation/restriction, improving soft tissue extensibility and allowing for proper ROM for normal function with self care, reaching, carrying, lifting, house/yardwork, driving/computer work    Modalities:  PM/CP to R shoulder,long sitting 15 min    Charges:  Timed Code Treatment Minutes: 40   Total Treatment Minutes: 55     [] EVAL (LOW) 45595 (typically 20 minutes face-to-face)  [] EVAL (MOD) 87984 (typically 30 minutes face-to-face)  [] EVAL (HIGH) 26083 (typically 45 minutes face-to-face)  [] RE-EVAL     [x] AI(44700) x  2 (8:45-9:10a)  [] IONTO  [] NMR (60672) x      [] VASO  [x] Manual (19261) x  1   (2:09-5:97N) [] Other:  [] TA x       [] Mech Traction (44797)  [] ES(attended) (02374)      [x] ES (un) (90804):  (9:12-9:27a)    GOALS:  Patient stated goal: Have no pain      Therapist goals for Patient:   Short Term Goals: To be achieved in: 2 weeks  1. Independent in HEP and progression per patient tolerance, in order to prevent re-injury. 2. Patient will have a decrease in pain to facilitate improvement in movement, function, and ADLs as indicated by Functional Deficits.     Long Term Goals: To be achieved in: 6 weeks  1. Disability index score of 17% or less for the NDI to assist with reaching prior level of function. 2. Patient will demonstrate increased AROM to 70 deg L rotation and 30 deg lateral flexion bilaterally of cervical/thoracic spine to allow for proper joint functioning as indicated by patients Functional Deficits. 3. Patient will demonstrate an increase in postural awareness and control and activation of  Deep cervical stabilizers to allow for proper functional mobility as indicated by patients Functional Deficits. 4. Patient will return to sleeping through the night and dressing without increased symptoms or restriction.    5. Return to weight lifting without any increased symptoms or restriction

## 2018-01-01 ENCOUNTER — HOSPITAL ENCOUNTER (OUTPATIENT)
Dept: PHYSICAL THERAPY | Age: 58
Discharge: HOME | End: 2018-01-12
Attending: NEUROLOGICAL SURGERY | Admitting: NEUROLOGICAL SURGERY

## 2018-01-02 ENCOUNTER — HOSPITAL ENCOUNTER (OUTPATIENT)
Dept: PHYSICAL THERAPY | Age: 58
Discharge: HOME | End: 2018-01-02
Admitting: NEUROLOGICAL SURGERY

## 2018-01-02 NOTE — FLOWSHEET NOTE
flossing  30x VC for stopping point                                 Manual Intervention      STM to R paraspinals, R UT/levator, R pec, PROM R shoulder, TP release post cuff, clavicle release  T spine mobs prone GII-III  Neural glide RUE 19'                                         NMR re-education                                    Traction                      Therapeutic Exercise and NMR EXR  [x] (38640) Provided verbal/tactile cueing for activities related to strengthening, flexibility, endurance, ROM  for improvements in cervical, postural, scapular, scapulothoracic and UE control with self care, reaching, carrying, lifting, house/yardwork, driving/computer work.    [] (83813) Provided verbal/tactile cueing for activities related to improving balance, coordination, kinesthetic sense, posture, motor skill, proprioception  to assist with cervical, scapular, scapulothoracic and UE control with self care, reaching, carrying, lifting, house/yardwork, driving/computer work. Therapeutic Activities:    [] (19155 or 24209) Provided verbal/tactile cueing for activities related to improving balance, coordination, kinesthetic sense, posture, motor skill, proprioception and motor activation to allow for proper function of cervical, scapular, scapulothoracic and UE control with self care, carrying, lifting, driving/computer work.      Home Exercise Program:    [x] (94015) Reviewed/Progressed HEP activities related to strengthening, flexibility, endurance, ROM of cervical, scapular, scapulothoracic and UE control with self care, reaching, carrying, lifting, house/yardwork, driving/computer work  [] (40355) Reviewed/Progressed HEP activities related to improving balance, coordination, kinesthetic sense, posture, motor skill, proprioception of cervical, scapular, scapulothoracic and UE control with self care, reaching, carrying, lifting, house/yardwork, driving/computer work      Manual Treatments:  PROM / STM / Oscillations-Mobs:  G-I, II, III, IV (PA's, Inf., Post.)  [x] (08427) Provided manual therapy to mobilize soft tissue/joints of cervical/CT, scapular GHJ and UE for the purpose of decreasing headache, modulating pain, promoting relaxation,  increasing ROM, reducing/eliminating soft tissue swelling/inflammation/restriction, improving soft tissue extensibility and allowing for proper ROM for normal function with self care, reaching, carrying, lifting, house/yardwork, driving/computer work    Modalities:  PM/CP to R shoulder,long sitting 15 min    Charges:10:22-  Timed Code Treatment Minutes: 44   Total Treatment Minutes: 59     [] EVAL (LOW) 53442 (typically 20 minutes face-to-face)  [] EVAL (MOD) 01701 (typically 30 minutes face-to-face)  [] EVAL (HIGH) 96162 (typically 45 minutes face-to-face)  [] RE-EVAL     [x] FB(10589) x  2 (10:41-11:08)  [] IONTO  [] NMR (35569) x      [] VASO  [x] Manual (56720) x  1   (10:22-10:41) [] Other:  [] TA x       [] Mech Traction (74979)  [] ES(attended) (14291)      [x] ES (un) (03728):  (11:08-11:23)    GOALS:  Patient stated goal: Have no pain      Therapist goals for Patient:   Short Term Goals: To be achieved in: 2 weeks  1. Independent in HEP and progression per patient tolerance, in order to prevent re-injury. 2. Patient will have a decrease in pain to facilitate improvement in movement, function, and ADLs as indicated by Functional Deficits.     Long Term Goals: To be achieved in: 6 weeks  1. Disability index score of 17% or less for the NDI to assist with reaching prior level of function. 2. Patient will demonstrate increased AROM to 70 deg L rotation and 30 deg lateral flexion bilaterally of cervical/thoracic spine to allow for proper joint functioning as indicated by patients Functional Deficits.    3. Patient will demonstrate an increase in postural awareness and control and activation of  Deep cervical stabilizers to allow for proper functional mobility as indicated by patients Functional Deficits. 4. Patient will return to sleeping through the night and dressing without increased symptoms or restriction. 5. Return to weight lifting without any increased symptoms or restriction (patient specific functional goal)                 Progression Towards Functional goals:  [x] Patient is progressing as expected towards functional goals listed. [] Progression is slowed due to complexities listed. [] Progression has been slowed due to co-morbidities. [] Plan just implemented, too soon to assess goals progression  [] Other:     ASSESSMENT: Pt tolerated therexs well.   Pt has minimal postural awareness at this time when engaging scapular muscles and finding cx neutral.     Treatment/Activity Tolerance:  [x] Patient tolerated treatment well [] Patient limited by fatique  [] Patient limited by pain  [] Patient limited by other medical complications  [] Other:     Prognosis: [x] Good [] Fair  [] Poor    Patient Requires Follow-up: [x] Yes  [] No    PLAN: Postural strengthening/re-edu  [x] Continue per plan of care [] Alter current plan (see comments)  [] Plan of care initiated [] Hold pending MD visit [] Discharge    Electronically signed by: Ondina Stanley, [id number]

## 2018-01-05 ENCOUNTER — PAT TELEPHONE (OUTPATIENT)
Dept: PREADMISSION TESTING | Age: 58
End: 2018-01-05

## 2018-01-05 ENCOUNTER — HOSPITAL ENCOUNTER (OUTPATIENT)
Dept: PHYSICAL THERAPY | Age: 58
Discharge: HOME | End: 2018-01-05
Admitting: NEUROLOGICAL SURGERY

## 2018-01-05 RX ORDER — TRANEXAMIC ACID 100 MG/ML
15 INJECTION, SOLUTION INTRAVENOUS ONCE
Status: CANCELLED | OUTPATIENT
Start: 2018-01-05 | End: 2018-01-05

## 2018-01-05 NOTE — FLOWSHEET NOTE
[facility], [address] [place]  Phone: [phone]  Fax [phone]      Physical Therapy Daily Treatment Note  Date:  2018    Patient Name:  Tee Courtney    :  1960  MRN: [id number]  Restrictions/Precautions:    Medical/Treatment Diagnosis Information:  Diagnosis: Cervical disc disorder w/ radiculopathy (M50.123)  Treatment Diagnosis: R shoulder stiffness (M25.611); Cervical pain (A94.2)  Insurance/Certification information:  PT Insurance Information: [facility]  Physician Information:  Referring Practitioner: Dr. Larry Heredia of care signed (Y/N): Y    Date of Patient follow up with Physician: 18    G-Code (if applicable):      Date G-Code Applied:  17       Progress Note: [x]  Yes  []  No  Next due by: Visit #10      Latex Allergy:  [x]NO      []YES  Preferred Language for Healthcare:   [x]English       []other:    Visit # Insurance Allowable Requires auth   6 12 (between -)    []no        [x]yes:     Pain level:  3/10     SUBJECTIVE: Pt states he feels like there is something wrong with his shoulder. Still feels the same, was throbbing a lot the other night.     OBJECTIVE:   Observation:   Test measurements:      RESTRICTIONS/PRECAUTIONS: No lifting >15 pounds; pt having L knee replaced by Dr. Haydee Frias on     Exercises/Interventions:   Therapeutic Ex Sets/sec Reps Notes   Chin tuck seated 5\" 15 Max cues, used hand behind pts head to cue for retraction         HEP   HEP   Single arm pec stretch 30\" 3 bilat   Scap squeeze with cx neutral 5\" 10 Pt cued to not use all UT and cx extension for scap sets   Seated no money with cx neutral 5\" 15 Good posture with minimal VCs      RTB Row  YTB ext 2  10 x 3\" EA +HEP   YTB No $ with chin tuck 3\" 2 x 10 +HEP VC for form   Finisher table chin tuck (3#) R/L foot forward  -Ladder  -Cross corner with opposite hand anchored at center  10x ea Oscillations-Mobs:  G-I, II, III, IV (PA's, Inf., Post.)  [x] (20181) Provided manual therapy to mobilize soft tissue/joints of cervical/CT, scapular GHJ and UE for the purpose of decreasing headache, modulating pain, promoting relaxation,  increasing ROM, reducing/eliminating soft tissue swelling/inflammation/restriction, improving soft tissue extensibility and allowing for proper ROM for normal function with self care, reaching, carrying, lifting, house/yardwork, driving/computer work    Modalities:  PM/CP to R shoulder,long sitting 15 min    Charges:  Timed Code Treatment Minutes: 40   Total Treatment Minutes: 55     [] EVAL (LOW) 90494 (typically 20 minutes face-to-face)  [] EVAL (MOD) 23844 (typically 30 minutes face-to-face)  [] EVAL (HIGH) 29573 (typically 45 minutes face-to-face)  [] RE-EVAL     [x] YG(63313) x  1 (8:48-9:09)  [] IONTO  [x] NMR (84880) x  1 (9:10-9:20  [] VASO  [x] Manual (82829) x  1   (8:33-8:48) [] Other:  [] TA x       [] Mech Traction (32206)  [] ES(attended) (86215)      [x] ES (un) (12100):  (9:20-9:35)    GOALS:  Patient stated goal: Have no pain      Therapist goals for Patient:   Short Term Goals: To be achieved in: 2 weeks  1. Independent in HEP and progression per patient tolerance, in order to prevent re-injury. 2. Patient will have a decrease in pain to facilitate improvement in movement, function, and ADLs as indicated by Functional Deficits.     Long Term Goals: To be achieved in: 6 weeks  1. Disability index score of 17% or less for the NDI to assist with reaching prior level of function. 2. Patient will demonstrate increased AROM to 70 deg L rotation and 30 deg lateral flexion bilaterally of cervical/thoracic spine to allow for proper joint functioning as indicated by patients Functional Deficits.    3. Patient will demonstrate an increase in postural awareness and control and activation of  Deep cervical stabilizers to allow for proper functional mobility as indicated

## 2018-01-08 VITALS — HEIGHT: 73 IN | WEIGHT: 260 LBS | BODY MASS INDEX: 34.46 KG/M2

## 2018-01-08 ASSESSMENT — PAIN - FUNCTIONAL ASSESSMENT: PAIN_FUNCTIONAL_ASSESSMENT: 0-10

## 2018-01-08 ASSESSMENT — PAIN DESCRIPTION - PAIN TYPE: TYPE: CHRONIC PAIN

## 2018-01-08 ASSESSMENT — PAIN DESCRIPTION - LOCATION: LOCATION: KNEE

## 2018-01-08 ASSESSMENT — PAIN SCALES - GENERAL: PAINLEVEL_OUTOF10: 6

## 2018-01-08 ASSESSMENT — PAIN DESCRIPTION - ORIENTATION: ORIENTATION: LEFT

## 2018-01-08 NOTE — PRE-PROCEDURE INSTRUCTIONS
they will be removed, please bring a case for them. 10. If appicable,Please see your family doctor/pediatrician for a history & physical and/or concerning medications. Bring any test results/reports from your physician's office. 11. Remember to bring Blood Bank bracelet to the hospital on the day of surgery. 12. If you have a Living Will and Durable Power of  for Healthcare, please bring in a copy. 15. Notify your Surgeon if you develop any illness between now and surgery  time, cough, cold, fever, sore throat, nausea, vomiting, etc.  Please notify your surgeon if you experience dizziness, shortness of breath or blurred vision between now & the time of your surgery   14. DO NOT shave your operative site 96 hours prior to surgery. For face & neck surgery, men may use an electric razor 48 hours prior to surgery. 15. Shower the night before surgery with __x_Antibacterial soap ___Hibiclens   16. To provide excellent care visitors will be limited to one in the room at any given time. 17.  Please bring picture ID and insurance card. 18.  Visit our web site for additional information:  [email or web address].

## 2018-01-09 ENCOUNTER — HOSPITAL ENCOUNTER (OUTPATIENT)
Dept: OTHER | Age: 58
Discharge: HOME | End: 2018-01-09
Attending: ORTHOPAEDIC SURGERY | Admitting: ORTHOPAEDIC SURGERY

## 2018-01-09 ENCOUNTER — TELEPHONE (OUTPATIENT)
Dept: ORTHOPEDIC SURGERY | Age: 58
End: 2018-01-09

## 2018-01-09 ENCOUNTER — HOSPITAL ENCOUNTER (OUTPATIENT)
Dept: PHYSICAL THERAPY | Age: 58
Discharge: HOME | End: 2018-01-09
Admitting: NEUROLOGICAL SURGERY

## 2018-01-09 LAB
ABO/RH: NORMAL
ANTIBODY SCREEN: NORMAL
APTT: 31.1 SEC (ref 24.1–34.9)
BASOPHILS ABSOLUTE: 0.1 K/UL (ref 0–0.2)
BASOPHILS RELATIVE PERCENT: 0.7 %
BILIRUBIN URINE: NEGATIVE
BLOOD, URINE: ABNORMAL
CLARITY: CLEAR
COLOR: YELLOW
EKG ATRIAL RATE: 79 BPM
EKG DIAGNOSIS: NORMAL
EKG P AXIS: 42 DEGREES
EKG P-R INTERVAL: 188 MS
EKG Q-T INTERVAL: 388 MS
EKG QRS DURATION: 96 MS
EKG QTC CALCULATION (BAZETT): 444 MS
EKG R AXIS: 23 DEGREES
EKG T AXIS: 43 DEGREES
EKG VENTRICULAR RATE: 79 BPM
EOSINOPHILS ABSOLUTE: 0.2 K/UL (ref 0–0.6)
EOSINOPHILS RELATIVE PERCENT: 2.3 %
EPITHELIAL CELLS, UA: ABNORMAL /HPF
GLUCOSE URINE: NEGATIVE MG/DL
HCT VFR BLD CALC: 47.3 % (ref 40.5–52.5)
HEMOGLOBIN: 16.1 G/DL (ref 13.5–17.5)
INR BLD: 0.96 (ref 0.85–1.15)
KETONES, URINE: NEGATIVE MG/DL
LEUKOCYTE ESTERASE, URINE: NEGATIVE
LYMPHOCYTES ABSOLUTE: 1.6 K/UL (ref 1–5.1)
LYMPHOCYTES RELATIVE PERCENT: 17.1 %
MCH RBC QN AUTO: 29.4 PG (ref 26–34)
MCHC RBC AUTO-ENTMCNC: 34.1 G/DL (ref 31–36)
MCV RBC AUTO: 86.1 FL (ref 80–100)
MICROSCOPIC EXAMINATION: YES
MONOCYTES ABSOLUTE: 0.6 K/UL (ref 0–1.3)
MONOCYTES RELATIVE PERCENT: 5.8 %
NEUTROPHILS ABSOLUTE: 7.1 K/UL (ref 1.7–7.7)
NEUTROPHILS RELATIVE PERCENT: 74.1 %
NITRITE, URINE: NEGATIVE
PDW BLD-RTO: 13.4 % (ref 12.4–15.4)
PH UA: 5.5
PLATELET # BLD: 202 K/UL (ref 135–450)
PMV BLD AUTO: 9.3 FL (ref 5–10.5)
PROTEIN UA: NEGATIVE MG/DL
PROTHROMBIN TIME: 10.9 SEC (ref 9.6–13)
RBC # BLD: 5.49 M/UL (ref 4.2–5.9)
RBC UA: ABNORMAL /HPF (ref 0–2)
SPECIFIC GRAVITY UA: 1.02
URINE TYPE: ABNORMAL
UROBILINOGEN, URINE: 0.2 E.U./DL
WBC # BLD: 9.6 K/UL (ref 4–11)
WBC UA: ABNORMAL /HPF (ref 0–5)

## 2018-01-09 PROCEDURE — 93010 ELECTROCARDIOGRAM REPORT: CPT | Performed by: INTERNAL MEDICINE

## 2018-01-09 NOTE — FLOWSHEET NOTE
[facility],  [address] [place]  Phone: [phone]  Fax [phone]      Physical Therapy Daily Treatment Note  Date:  2018    Patient Name:  Swapnil Lowe    :  1960  MRN: [id number]  Restrictions/Precautions:    Medical/Treatment Diagnosis Information:  Diagnosis: Cervical disc disorder w/ radiculopathy (M50.123)  Treatment Diagnosis: R shoulder stiffness (M25.611); Cervical pain (T49.8)  Insurance/Certification information:  PT Insurance Information: [facility]  Physician Information:  Referring Practitioner: Dr. Ramon Galvan of care signed (Y/N): Y    Date of Patient follow up with Physician: 18    G-Code (if applicable):      Date G-Code Applied:  17       Progress Note: [x]  Yes  []  No  Next due by: Visit #10      Latex Allergy:  [x]NO      []YES  Preferred Language for Healthcare:   [x]English       []other:    Visit # Insurance Allowable Requires auth   7 12 (between -)    []no        [x]yes:     Pain level:  3/10     SUBJECTIVE: Pt states his neck feels like it is loosening up finally. Arm still hurts at night.      OBJECTIVE:   Observation:   Test measurements:      RESTRICTIONS/PRECAUTIONS: No lifting >15 pounds; pt having L knee replaced by Dr. Lenora Baxter on     Exercises/Interventions:   Therapeutic Ex Sets/sec Reps Notes   Supine chin tuck 5\" 15    Seated chin tuck 5\" 15 Max cues, used hand behind pts head to cue for retraction   HEP   HEP   Single arm pec stretch 30\" 3 bilat   Scap squeeze with cx neutral 5\" 10 Pt cued to not use all UT and cx extension for scap sets            RTB Row  YTB ext 2  10 x 3\" EA +HEP   YTB No $ with chin tuck 3\" 2 x 10 +HEP VC for form   Finisher table chin tuck (3#) R/L foot forward  -Ladder  -Cross corner with opposite hand anchored at center  10x ea    Ulnar nerve flossing  30x VC for stopping point         Pulleys  30x

## 2018-01-10 LAB — URINE CULTURE, ROUTINE: NORMAL

## 2018-01-12 ENCOUNTER — HOSPITAL ENCOUNTER (OUTPATIENT)
Dept: PHYSICAL THERAPY | Age: 58
Discharge: HOME | End: 2018-01-12
Admitting: NEUROLOGICAL SURGERY

## 2018-01-12 NOTE — FLOWSHEET NOTE
[facility], [address] [place] [address]  Phone: [phone]  Fax [phone]      Physical Therapy Daily Treatment Note  Date:  2018    Patient Name:  Darwin Carrasco    :  1960  MRN: [id number]  Restrictions/Precautions:    Medical/Treatment Diagnosis Information:  Diagnosis: Cervical disc disorder w/ radiculopathy (M50.123)  Treatment Diagnosis: R shoulder stiffness (M25.611); Cervical pain (Q65.9)  Insurance/Certification information:  PT Insurance Information: [facility]  Physician Information:  Referring Practitioner: Dr. Justyn Valle of care signed (Y/N): Y    Date of Patient follow up with Physician: 18    G-Code (if applicable):      Date G-Code Applied:  17  PT G-Codes  Functional Assessment Tool Used: NDI  Score: 20%  Functional Limitation: Changing and maintaining body position  Changing and Maintaining Body Position Current Status (): At least 20 percent but less than 40 percent impaired, limited or restricted  Changing and Maintaining Body Position Goal Status (): At least 1 percent but less than 20 percent impaired, limited or restricted  Changing and Maintaining Body Position Discharge Status (): At least 20 percent but less than 40 percent impaired, limited or restricted    Progress Note: [x]  Yes  []  No  Next due by: Visit #10      Latex Allergy:  [x]NO      []YES  Preferred Language for Healthcare:   [x]English       []other:    Visit # Insurance Allowable Requires auth   8 12 (between -)    []no        [x]yes:     Pain level:  0/10     SUBJECTIVE: Pt states his neck feels good today.     OBJECTIVE:   Observation:   Test measurements: 80 deg L rotation, 25 right flex, 19 left flexion     RESTRICTIONS/PRECAUTIONS: No lifting >15 pounds; pt having L knee replaced by Dr. César Waddell on     Exercises/Interventions:   Therapeutic Ex Sets/sec Reps Notes   Supine chin tuck 5\" UE control with self care, reaching, carrying, lifting, house/yardwork, driving/computer work  [] (84525) Reviewed/Progressed HEP activities related to improving balance, coordination, kinesthetic sense, posture, motor skill, proprioception of cervical, scapular, scapulothoracic and UE control with self care, reaching, carrying, lifting, house/yardwork, driving/computer work      Manual Treatments:  PROM / STM / Oscillations-Mobs:  G-I, II, III, IV (PA's, Inf., Post.)  [x] (36251) Provided manual therapy to mobilize soft tissue/joints of cervical/CT, scapular GHJ and UE for the purpose of decreasing headache, modulating pain, promoting relaxation,  increasing ROM, reducing/eliminating soft tissue swelling/inflammation/restriction, improving soft tissue extensibility and allowing for proper ROM for normal function with self care, reaching, carrying, lifting, house/yardwork, driving/computer work    Modalities:  PM/CP to R shoulder,long sitting 15 min    Charges:  Timed Code Treatment Minutes: 42   Total Treatment Minutes: 57     [] EVAL (LOW) 07015 (typically 20 minutes face-to-face)  [] EVAL (MOD) 43441 (typically 30 minutes face-to-face)  [] EVAL (HIGH) 59425 (typically 45 minutes face-to-face)  [] RE-EVAL     [x] IY(87975) x  1 (8:40-9:12)  [] IONTO  [] NMR (03608) x      [] VASO  [x] Manual (66190) x  1   (8:28-8:40a) [] Other:  [] TA x       [] Mech Traction (35369)  [] ES(attended) (10962)      [x] ES (un) (91974):  (9:14-9:29)    GOALS:  Patient stated goal: Have no pain      Therapist goals for Patient:   Short Term Goals: To be achieved in: 2 weeks  1. Independent in HEP and progression per patient tolerance, in order to prevent re-injury. 2. Patient will have a decrease in pain to facilitate improvement in movement, function, and ADLs as indicated by Functional Deficits.     Long Term Goals: To be achieved in: 6 weeks  1.  Disability index score of 17% or less for the NDI to assist with reaching prior

## 2018-01-12 NOTE — DISCHARGE SUMMARY
[facility],  [address] [place]  Phone: [phone]  Fax [phone]       Physical Therapy Discharge  Date: 2018        Patient Name:  Margarito Pena    :  1960  MRN: [id number]  Referring Physician: Dr. Chanel Yee disc disorder w/ radiculopathy (Z85.096)  [x] Surgical [] Conservative  Therapy Diagnosis/Practice Pattern:R shoulder stiffness (M25.611); Cervical pain (M54.2)      Number of Comorbidities:  []0     [x]1-2    []3+  Total number of visits: 8  Reporting Period:   Beginning Date:17   End Date:18    OBJECTIVE  Test used Initial score Discharge Score   Pain Summary Pain scale 4/10 0/10   Functional questionnaire NDI 34% 20%               ROM C flexion 22 deg     C extension 25 deg     L lateral flex 10 deg 19 deg    R lateral flex 18 deg 25 deg    L rotation 49 deg 80 deg    R rotation 75 deg 80 deg         Strength DNF Poor Good-              Functional Limitation G-Code (if applicable):         PT G-Codes  Functional Assessment Tool Used: NDI  Score: 20%  Functional Limitation: Changing and maintaining body position  Changing and Maintaining Body Position Current Status (): At least 20 percent but less than 40 percent impaired, limited or restricted  Changing and Maintaining Body Position Goal Status (): At least 1 percent but less than 20 percent impaired, limited or restricted  Changing and Maintaining Body Position Discharge Status ():  At least 20 percent but less than 40 percent impaired, limited or restricted   Test/tests used to determine % limitation: NDI  Actual Score used to drive % limitation:  20%    Treatment to date:  [x] Therapeutic Exercise    [x] Modalities:  [] Therapeutic Activity             []Ultrasound            [x]Electrical Stimulation  [] Gait Training     []Cervical Traction    [] Lumbar Traction  [x] Neuromuscular Re-education [x] Cold/hotpack         []Iontophoresis  [x] Instruction in HEP      Other:  [x] Manual Therapy                   []                       ?           []   Assessment:  [] All Goals were achieved. [x] The following goals were achieved (#'s): 2/5  [] The following goals were not achieved for the following reasons:  Summary/assessmen of improvement as it relates to each goal: Patient has improved range of motion nad has decreased neck tightness since starting therapy. His pain level has decreased significantly but still occasionally  has arm pain. Patient will be having a total knee replacement on 1/16 which will be his primary focus going forward. He does have all the exercises we have done in therapy on a HEP to refer to if he has a recurrence of pain. Plan of Care:  [x] Discharge from Therapy Services due to:    Reason for Discharge:   [] All goals achieved    [x] Patient having surgery (for non-related body part)  [] Physician discontinued therapy  [] Insurance/Financial Limitations [] Patient did not return for follow up visits [] Home program/1 visit only   [] No subjective or objective improvement [] Plateaued   [] Patient was unable to adhere to the plan of care established at evaluation. [] Referred back to physician for re-evaluation and did not return.      [] Other:?       Electronically signed by:  Nabil Whitt, PT,DPT [id number]

## 2018-01-16 PROBLEM — M17.12 ARTHRITIS OF LEFT KNEE: Status: ACTIVE | Noted: 2018-01-16

## 2018-01-17 PROBLEM — Z96.652 STATUS POST TOTAL LEFT KNEE REPLACEMENT: Status: ACTIVE | Noted: 2018-01-17

## 2018-01-22 ENCOUNTER — TELEPHONE (OUTPATIENT)
Dept: ORTHOPEDIC SURGERY | Age: 58
End: 2018-01-22

## 2018-01-22 ENCOUNTER — OFFICE VISIT (OUTPATIENT)
Dept: ORTHOPEDIC SURGERY | Age: 58
End: 2018-01-22

## 2018-01-22 VITALS — WEIGHT: 244 LBS | HEIGHT: 72 IN | BODY MASS INDEX: 33.05 KG/M2

## 2018-01-22 DIAGNOSIS — Z96.652 STATUS POST TOTAL LEFT KNEE REPLACEMENT: Primary | ICD-10-CM

## 2018-01-22 DIAGNOSIS — Z96.652 STATUS POST TOTAL LEFT KNEE REPLACEMENT: ICD-10-CM

## 2018-01-22 PROCEDURE — 99024 POSTOP FOLLOW-UP VISIT: CPT | Performed by: ORTHOPAEDIC SURGERY

## 2018-01-22 RX ORDER — HYDROCODONE BITARTRATE AND ACETAMINOPHEN 10; 325 MG/1; MG/1
1 TABLET ORAL EVERY 4 HOURS PRN
Qty: 40 TABLET | Refills: 0 | Status: SHIPPED | OUTPATIENT
Start: 2018-01-24 | End: 2018-01-22 | Stop reason: SDUPTHER

## 2018-01-22 RX ORDER — HYDROCODONE BITARTRATE AND ACETAMINOPHEN 10; 325 MG/1; MG/1
1 TABLET ORAL EVERY 4 HOURS PRN
Qty: 40 TABLET | Refills: 0 | Status: SHIPPED | OUTPATIENT
Start: 2018-01-24 | End: 2018-01-31

## 2018-01-22 RX ORDER — OXYCODONE HYDROCHLORIDE AND ACETAMINOPHEN 5; 325 MG/1; MG/1
1 TABLET ORAL EVERY 6 HOURS PRN
Refills: 0 | OUTPATIENT
Start: 2018-01-22 | End: 2018-01-29

## 2018-01-22 NOTE — TELEPHONE ENCOUNTER
Spoke with pt, had some issues with pain medication making pt loose his breath, and causing hiccups for 6 days straight. Was on Norco, stopped taking it 2 days ago. Incision status: No drainage or redness    Edema/Swelling/Teds: Having a large amount of swelling in knee and in calf. Negative for holmans sign. Home health nurse had told pt his heart rate was 115 today. Wearing TEDS daily, icing, and elevating leg. Calf is warm and a little red. Pain level and status: Pain is manageable with no pain meds. Use of pain medications: Stopped norco for episodes of shortness of breath. Blood thinner: Using lovenox    Bowels: Moving fine    Home Care Agency active: [facility]    Outpatient therapy: Not yet    Do you have all of your medications: Yes    Changes in medications: No    Ortho Vitals: N/A   Sent message to Tracey ADAM notifying her of pts recent issues- will await instructions.      Follow up appointments:    Future Appointments  Date Time Provider Murphy Pinzon   1/31/2018 10:40 AM [place] Sharlyne Leap, MD [facility]

## 2018-01-22 NOTE — PROGRESS NOTES
Chief Complaint  Post-Op Check (left knee   sx 01/16/2018    tkr)    left knee status post total knee arthroplasty. Date of surgery: 1/16/2018    History of Present Illness:  Tee Xavier is a 56 y.o. male here for first follow-up regarding their left total knee arthroplasty. Recently she has had some problems at home. He's developed hiccups which have been fairly persistent although today and they've not been quite his frequent. He also has had swelling in his left lower extremity. He has had pain. He has not used any of his narcotic pain medicine for the last 3 days the cause he was concerned that his pain medicine was making him short of breath. When he gets his hiccups, he has some intermittent difficulty with breathing. Usually that's when he is lying down and he is a very large man and so his abdomen seems to push on his chest and makes it hard for him to breathe especially when his hiccups are active. There was concern today that his shortness of breath might be related to a possible PE although on questioning he has no difficulty breathing and he has no pain with breathing and it is only during the pickups and only when his upper body is more flat. He also has been trying to use the thigh-high teds stocking. He has been sleeping in a recliner which concerns me because he keeps his knee slightly bent and certainly keeps his hip and therefore decreasing the ability for the swelling to drain out of his left lower extremity. Vital Signs: There were no vitals filed for this visit. Left Knee Examination:  Inspection:  No gross deformities noted. Significant swelling noted, mild to moderate degenerative ecchymosis and erythema. Incision site is clean, dry, intact and well-healing. Palpation:  Minimal tenderness to palpation at the knee joint. moderate effusion. He has swelling that involves both thigh and calf.   He has active range of motion of his ankle he has no pain in his calf with full active dorsiflexion and plantarflexion of his ankle. His Homans test is negative. Range of Motion:  He is limited range of motion of his knee secondary to pain. Last lacks about 5 of full extension flexes only today to about 60. Strength: Improving strength noted    Special Tests:  No instability is noted today. Skin: There are no rashes, ulcerations or lesions. Gait: he is ambulating today with crutches partial weightbearing        Assessment :  left knee status post total knee arthroplasty. With significant swelling. He has no evidence of DVT and has declining frequency of his hiccups. Impression:  Encounter Diagnosis   Name Primary?  Status post total left knee replacement Yes       Office Procedures:  No orders of the defined types were placed in this encounter. Treatment Plan:    He primarily needs to keep his leg more elevated and he needs to be supine. So I suggested that he actually go to his bed and resting in his bed with his leg elevated and his upper body more flat. He probably cannot adequately get the swelling out of his lower extremity if he is in his recliner. We are going to discontinue the stocking because I think the creases in the stocking may be impeding his swelling relief as well    Follow up with us in 1 week. This dictation was performed with a verbal recognition program (DRAGON) and it was checked for errors. It is possible that there are still dictated errors within this office note. If so, please bring any errors to my attention for an addendum. All efforts were made to ensure that this office note is accurate.

## 2018-01-22 NOTE — TELEPHONE ENCOUNTER
Spoke with Monica Pa- pt will be called to schedule an appt to be seen in office today. RN notified pt, pt verbalized understanding.     Prabhakar Romero  Orthopedic Nurse Navigator  Phone number: [phone]

## 2018-01-31 ENCOUNTER — OFFICE VISIT (OUTPATIENT)
Dept: ORTHOPEDIC SURGERY | Age: 58
End: 2018-01-31

## 2018-01-31 DIAGNOSIS — R52 PAIN: Primary | ICD-10-CM

## 2018-01-31 DIAGNOSIS — Z96.652 STATUS POST TOTAL LEFT KNEE REPLACEMENT: ICD-10-CM

## 2018-01-31 PROCEDURE — 99024 POSTOP FOLLOW-UP VISIT: CPT | Performed by: ORTHOPAEDIC SURGERY

## 2018-01-31 RX ORDER — AMOXICILLIN 500 MG/1
TABLET, FILM COATED ORAL
Qty: 4 TABLET | Refills: 3 | Status: ON HOLD | OUTPATIENT
Start: 2018-01-31 | End: 2018-02-27 | Stop reason: HOSPADM

## 2018-02-01 ENCOUNTER — HOSPITAL ENCOUNTER (OUTPATIENT)
Dept: PHYSICAL THERAPY | Age: 58
Discharge: HOME | End: 2018-02-28
Attending: ORTHOPAEDIC SURGERY | Admitting: ORTHOPAEDIC SURGERY

## 2018-02-06 ENCOUNTER — TELEPHONE (OUTPATIENT)
Dept: ORTHOPEDIC SURGERY | Age: 58
End: 2018-02-06

## 2018-02-06 ENCOUNTER — HOSPITAL ENCOUNTER (OUTPATIENT)
Dept: PHYSICAL THERAPY | Age: 58
Discharge: HOME | End: 2018-02-07
Admitting: ORTHOPAEDIC SURGERY

## 2018-02-06 NOTE — PLAN OF CARE
[facility]  [facility], [place] [address] [place], [address] [facility] [address]  Phone: [phone]   Fax:     [phone]       Physical Therapy Certification    Dear Referring Practitioner: Dr. Susie Dutta,    We had the pleasure of evaluating the following patient for physical therapy services at [address] [place] [address]. A summary of our findings can be found in the initial assessment below. This includes our plan of care. If you have any questions or concerns regarding these findings, please do not hesitate to contact me at the office phone number checked above. Thank you for the referral.       Physician Signature:_______________________________Date:__________________  By signing above (or electronic signature), therapists plan is approved by physician      Patient: Yareli Justice   : 1960   MRN: [id number]  Referring Physician: Referring Practitioner: Dr. Susie Dutta      Evaluation Date: 2018      Medical Diagnosis Information:  Diagnosis: M17.12 (ICD-10-CM) - Unilateral primary osteoarthritis, left knee; U77.290C (ICD-10-CM) - Other tear of medial meniscus, current injury, left knee, initial encounter   Treatment Diagnosis: M25.562 L knee pain; M25.662 L knee stiffness; Z96.659 s/p L TKR DOS 18                                         Insurance information: PT Insurance Information:  [facility] Approved     Precautions/ Contra-indications: WBAT  Latex Allergy:  [x]NO      []YES  Preferred Language for Healthcare:   [x]English       []other:    SUBJECTIVE: Patient stated complaint: Pt presents to physical therapy s/p L TKA DOS 18. Pt's original knee injury involved a meniscal tear sustained when he was lifting something heavy at work.      Pt reports he has had a lot of trouble with LLE swelling since the operation and went to the ED yesterday due to fever onset Ankle Inv     Ankle EV          Circumference  Mid apex  7 cm prox             Reflexes/Sensation:    [x]Dermatomes/Myotomes intact (outside numbness around incision)   []Reflexes equal and normal bilaterally   []Other:     Joint mobility: L knee joint   []Normal    [x]Hypo   []Hyper    Palpation: Stiff/boggy tissue throughout LLE, 2+ pitting edema in the ankle    Functional Mobility/Transfers: Modified w/ decreased LLE WB and mobility (crouched and knee contracted gait w/ use of unilateral crutch)    Posture: Forward head, rounded shoulders    Bandages/Dressings/Incisions: Surgical dressing overtop anterior knee incision, dried blood and marker still apparent. No signs of infection or fibrosis, no drainage. Gait: (include devices/WB status) Unilateral crutch gait w/ decreased LLE WB and decreased clearance throughout, knee contracted gait    Orthopedic Special Tests: NA                       [x] Patient history, allergies, meds reviewed. Medical chart reviewed. See intake form. Review Of Systems (ROS):  [x]Performed Review of systems (Integumentary, CardioPulmonary, Neurological) by intake and observation. Intake form has been scanned into medical record. Patient has been instructed to contact their primary care physician regarding ROS issues if not already being addressed at this time.       Co-morbidities/Complexities (which will affect course of rehabilitation):   []None           Arthritic conditions   []Rheumatoid arthritis (M05.9)  [x]Osteoarthritis (M19.91)   Cardiovascular conditions   [x]Hypertension (I10)  []Hyperlipidemia (E78.5)  []Angina pectoris (I20)  []Atherosclerosis (I70)   Musculoskeletal conditions   []Disc pathology   []Congenital spine pathologies   []Prior surgical intervention  []Osteoporosis (M81.8)  []Osteopenia (M85.8)   Endocrine conditions   []Hypothyroid (E03.9)  []Hyperthyroid Gastrointestinal conditions   []Constipation (A56.32)   Metabolic conditions   []Morbid obesity

## 2018-02-06 NOTE — FLOWSHEET NOTE
Pt education: Pt and wife educated on POC and HEP. Discussed compression and swelling control. Discussed importance of compliance w/ HEP and therapy given current level of ROM deficits. 15'  Re-wrapped with ace bandage    Manual Intervention       PROM 5'                                         NMR re-education       SLS  30\" 2 B    Gait training w/ unilateral crutch (emphasis on clearance and knee motion) 5'                                                           Therapeutic Exercise and NMR EXR  [x] (91008) Provided verbal/tactile cueing for activities related to strengthening, flexibility, endurance, ROM for improvements in LE, proximal hip, and core control with self care, mobility, lifting, ambulation.  [] (42959) Provided verbal/tactile cueing for activities related to improving balance, coordination, kinesthetic sense, posture, motor skill, proprioception  to assist with LE, proximal hip, and core control in self care, mobility, lifting, ambulation and eccentric single leg control.      NMR and Therapeutic Activities:    [x] (21852 or 20700) Provided verbal/tactile cueing for activities related to improving balance, coordination, kinesthetic sense, posture, motor skill, proprioception and motor activation to allow for proper function of core, proximal hip and LE with self care and ADLs  [] (65213) Gait Re-education- Provided training and instruction to the patient for proper LE, core and proximal hip recruitment and positioning and eccentric body weight control with ambulation re-education including up and down stairs     Home Exercise Program:    [x] (90863) Reviewed/Progressed HEP activities related to strengthening, flexibility, endurance, ROM of core, proximal hip and LE for functional self-care, mobility, lifting and ambulation/stair navigation   [] (71327)Reviewed/Progressed HEP activities related to improving balance, coordination, kinesthetic sense, posture, motor skill, proprioception of core, proximal hip and LE for self care, mobility, lifting, and ambulation/stair navigation      Manual Treatments:  PROM / STM / Oscillations-Mobs:  G-I, II, III, IV (PA's, Inf., Post.)  [] (46582) Provided manual therapy to mobilize LE, proximal hip and/or LS spine soft tissue/joints for the purpose of modulating pain, promoting relaxation,  increasing ROM, reducing/eliminating soft tissue swelling/inflammation/restriction, improving soft tissue extensibility and allowing for proper ROM for normal function with self care, mobility, lifting and ambulation. Modalities:  Electrical stimulation for pain control. Waveform: Premod; Cycle Time: Continuous; Frequency: 80/150 Hz; Amplitude: 50-60 Volts; Treatment time: 10 min with cold pack and heel prop 5-8 minutes of 10. Charges:   Timed Code Treatment Minutes: 30   Total Treatment Minutes: 65     [x] EVAL low  [x] RN(55346) x  1   [] IONTO  [x] NMR (13008) x  1   [] VASO  [] Manual (85944) x       [] Other:  [] TA x       [] Mech Traction (65041)  [] ES(attended) (30343)      [x] ES (un) (67173):     GOALS:  Patient stated goal: to be able to walk better and pain free    Therapist goals for Patient:   Short Term Goals: To be achieved in: 2 weeks  1. Independent in HEP and progression per patient tolerance, in order to prevent re-injury. 2. Patient will have a decrease in pain to facilitate improvement in movement, function, and ADLs as indicated by Functional Deficits. Long Term Goals: To be achieved in: 12 weeks  1. Disability index score of 50% or less for the LEFS to assist with reaching prior level of function. 2. Patient will demonstrate increased AROM to within 10-15 degrees of RLE to allow for proper joint functioning as indicated by patients Functional Deficits.    3. Patient will demonstrate an increase in Strength to good proximal hip strength and control, within 5lb HHD in LE to allow for proper functional mobility as indicated by patients

## 2018-02-08 ENCOUNTER — HOSPITAL ENCOUNTER (OUTPATIENT)
Dept: PHYSICAL THERAPY | Age: 58
Discharge: HOME | End: 2018-02-09
Admitting: ORTHOPAEDIC SURGERY

## 2018-02-12 ENCOUNTER — OFFICE VISIT (OUTPATIENT)
Dept: ORTHOPEDIC SURGERY | Age: 58
End: 2018-02-12

## 2018-02-12 ENCOUNTER — TELEPHONE (OUTPATIENT)
Dept: ORTHOPEDIC SURGERY | Age: 58
End: 2018-02-12

## 2018-02-12 ENCOUNTER — HOSPITAL ENCOUNTER (OUTPATIENT)
Dept: PHYSICAL THERAPY | Age: 58
Discharge: HOME | End: 2018-02-13
Admitting: ORTHOPAEDIC SURGERY

## 2018-02-12 VITALS
BODY MASS INDEX: 33.05 KG/M2 | DIASTOLIC BLOOD PRESSURE: 84 MMHG | HEIGHT: 72 IN | SYSTOLIC BLOOD PRESSURE: 124 MMHG | WEIGHT: 244 LBS | HEART RATE: 88 BPM

## 2018-02-12 DIAGNOSIS — Z96.652 STATUS POST TOTAL LEFT KNEE REPLACEMENT: Primary | ICD-10-CM

## 2018-02-12 PROCEDURE — 99024 POSTOP FOLLOW-UP VISIT: CPT | Performed by: ORTHOPAEDIC SURGERY

## 2018-02-12 NOTE — FLOWSHEET NOTE
LE, proximal hip and/or LS spine soft tissue/joints for the purpose of modulating pain, promoting relaxation,  increasing ROM, reducing/eliminating soft tissue swelling/inflammation/restriction, improving soft tissue extensibility and allowing for proper ROM for normal function with self care, mobility, lifting and ambulation. Modalities:  Electrical stimulation for pain control. Waveform: Premod; Cycle Time: Continuous; Frequency: 80/150 Hz; Amplitude: 50-60 Volts; Treatment time: 10 min with cold pack. Charges:   Timed Code Treatment Minutes: 55   Total Treatment Minutes: 65     [] EVAL low  [x] YD(49940) x  2   [] IONTO  [x] NMR (12260) x  1   [] VASO  [x] Manual (69484) x  1    [] Other:  [] TA x       [] Mech Traction (58967)  [] ES(attended) (70350)      [x] ES (un) (51243):     GOALS:  Patient stated goal: to be able to walk better and pain free    Therapist goals for Patient:   Short Term Goals: To be achieved in: 2 weeks  1. Independent in HEP and progression per patient tolerance, in order to prevent re-injury. -met 2/12/18  2. Patient will have a decrease in pain to facilitate improvement in movement, function, and ADLs as indicated by Functional Deficits. Long Term Goals: To be achieved in: 12 weeks  1. Disability index score of 50% or less for the LEFS to assist with reaching prior level of function. 2. Patient will demonstrate increased AROM to within 10-15 degrees of RLE to allow for proper joint functioning as indicated by patients Functional Deficits. 3. Patient will demonstrate an increase in Strength to good proximal hip strength and control, within 5lb HHD in LE to allow for proper functional mobility as indicated by patients Functional Deficits. 4. Patient will return to all household functional activities without increased symptoms or restriction.    5. Pt will be able to ambulate without assistive device or increase pain or symptoms (non-antalagic gait) (patient specific

## 2018-02-12 NOTE — PROGRESS NOTES
6 or 7 weeks postop. He will follow up again in 2 weeks for reexamination. This dictation was performed with a verbal recognition program (DRAGON) and it was checked for errors. It is possible that there are still dictated errors within this office note. If so, please bring any errors to my attention for an addendum. All efforts were made to ensure that this office note is accurate.

## 2018-02-15 ENCOUNTER — TELEPHONE (OUTPATIENT)
Dept: ORTHOPEDIC SURGERY | Age: 58
End: 2018-02-15

## 2018-02-16 ENCOUNTER — HOSPITAL ENCOUNTER (OUTPATIENT)
Dept: PHYSICAL THERAPY | Age: 58
Discharge: HOME | End: 2018-02-17
Admitting: ORTHOPAEDIC SURGERY

## 2018-02-16 NOTE — FLOWSHEET NOTE
massage for swelling reduction                                       NMR re-education       SLS  B    Gait training w/ unilateral crutch (emphasis on clearance and knee motion)                                                        Therapeutic Exercise and NMR EXR   [x] (15708) Provided verbal/tactile cueing for activities related to strengthening, flexibility, endurance, ROM for improvements in LE, proximal hip, and core control with self care, mobility, lifting, ambulation.  [] (55237) Provided verbal/tactile cueing for activities related to improving balance, coordination, kinesthetic sense, posture, motor skill, proprioception  to assist with LE, proximal hip, and core control in self care, mobility, lifting, ambulation and eccentric single leg control.      NMR and Therapeutic Activities:    [] (73437 or 28075) Provided verbal/tactile cueing for activities related to improving balance, coordination, kinesthetic sense, posture, motor skill, proprioception and motor activation to allow for proper function of core, proximal hip and LE with self care and ADLs  [] (30390) Gait Re-education- Provided training and instruction to the patient for proper LE, core and proximal hip recruitment and positioning and eccentric body weight control with ambulation re-education including up and down stairs     Home Exercise Program:    [x] (24193) Reviewed/Progressed HEP activities related to strengthening, flexibility, endurance, ROM of core, proximal hip and LE for functional self-care, mobility, lifting and ambulation/stair navigation   [] (57798)Reviewed/Progressed HEP activities related to improving balance, coordination, kinesthetic sense, posture, motor skill, proprioception of core, proximal hip and LE for self care, mobility, lifting, and ambulation/stair navigation      Manual Treatments:  PROM / STM / Oscillations-Mobs:  G-I, II, III, IV (PA's, Inf., Post.)  [x] (65214) Provided manual therapy to mobilize LE, proximal

## 2018-02-19 ENCOUNTER — HOSPITAL ENCOUNTER (OUTPATIENT)
Dept: PHYSICAL THERAPY | Age: 58
Discharge: HOME | End: 2018-02-20
Admitting: ORTHOPAEDIC SURGERY

## 2018-02-19 NOTE — FLOWSHEET NOTE
PROM (counter force w/ towels), patellar mobs, Tib-Fem post mobs/ant 10'                                         NMR re-education       SLS  B                                                            Therapeutic Exercise and NMR EXR   [x] (61870) Provided verbal/tactile cueing for activities related to strengthening, flexibility, endurance, ROM for improvements in LE, proximal hip, and core control with self care, mobility, lifting, ambulation.  [] (96387) Provided verbal/tactile cueing for activities related to improving balance, coordination, kinesthetic sense, posture, motor skill, proprioception  to assist with LE, proximal hip, and core control in self care, mobility, lifting, ambulation and eccentric single leg control.      NMR and Therapeutic Activities:    [] (78748 or 65797) Provided verbal/tactile cueing for activities related to improving balance, coordination, kinesthetic sense, posture, motor skill, proprioception and motor activation to allow for proper function of core, proximal hip and LE with self care and ADLs  [] (65762) Gait Re-education- Provided training and instruction to the patient for proper LE, core and proximal hip recruitment and positioning and eccentric body weight control with ambulation re-education including up and down stairs     Home Exercise Program:    [x] (77099) Reviewed/Progressed HEP activities related to strengthening, flexibility, endurance, ROM of core, proximal hip and LE for functional self-care, mobility, lifting and ambulation/stair navigation   [] (35706)Reviewed/Progressed HEP activities related to improving balance, coordination, kinesthetic sense, posture, motor skill, proprioception of core, proximal hip and LE for self care, mobility, lifting, and ambulation/stair navigation      Manual Treatments:  PROM / STM / Oscillations-Mobs:  G-I, II, III, IV (PA's, Inf., Post.)  [x] (69771) Provided manual therapy to mobilize LE, proximal hip and/or LS spine soft tissue/joints for the purpose of modulating pain, promoting relaxation,  increasing ROM, reducing/eliminating soft tissue swelling/inflammation/restriction, improving soft tissue extensibility and allowing for proper ROM for normal function with self care, mobility, lifting and ambulation. Modalities:  Electrical stimulation for pain control. Waveform: Premod; Cycle Time: Continuous; Frequency: 80/150 Hz; Amplitude: 50-60 Volts; Treatment time: 10 min with cold pack. Charges:   Timed Code Treatment Minutes: 40   Total Treatment Minutes: 50     [] EVAL low  [x] IV(95967) x  2   [] IONTO  [] NMR (96415) x      [] VASO  [x] Manual (86865) x  1    [] Other:  [] TA x       [] Mech Traction (19381)  [] ES(attended) (97903)      [x] ES (un) (46723):     GOALS:  Patient stated goal: to be able to walk better and pain free    Therapist goals for Patient:   Short Term Goals: To be achieved in: 2 weeks  1. Independent in HEP and progression per patient tolerance, in order to prevent re-injury. -met 2/12/18  2. Patient will have a decrease in pain to facilitate improvement in movement, function, and ADLs as indicated by Functional Deficits. Long Term Goals: To be achieved in: 12 weeks  1. Disability index score of 50% or less for the LEFS to assist with reaching prior level of function. 2. Patient will demonstrate increased AROM to within 10-15 degrees of RLE to allow for proper joint functioning as indicated by patients Functional Deficits. 3. Patient will demonstrate an increase in Strength to good proximal hip strength and control, within 5lb HHD in LE to allow for proper functional mobility as indicated by patients Functional Deficits. 4. Patient will return to all household functional activities without increased symptoms or restriction.    5. Pt will be able to ambulate without assistive device or increase pain or symptoms (non-antalagic gait) (patient specific functional goal)       Progression Towards Functional goals:  [] Patient is progressing as expected towards functional goals listed. [x] Progression is slowed due to complexities listed. [] Progression has been slowed due to co-morbidities. [] Plan just implemented, too soon to assess goals progression  [] Other:     ASSESSMENT:  Still very stiff, but good compliance and motivation to improve by the patient. EZ stretch home use ASAP. Working towards swelling reduction and improving Flex>Ext ROM restriction.     Treatment/Activity Tolerance:  [x] Patient tolerated treatment well [] Patient limited by fatique  [] Patient limited by pain  [] Patient limited by other medical complications  [] Other:     Prognosis: [] Good [x] Fair  [] Poor    Patient Requires Follow-up: [x] Yes  [] No    PLAN: See eval  [x] Continue per plan of care [] Alter current plan (see comments)  [] Plan of care initiated [] Hold pending MD visit [] Discharge     Electronically signed by: Ronald Gregg PT,DPT

## 2018-02-23 ENCOUNTER — HOSPITAL ENCOUNTER (OUTPATIENT)
Dept: PHYSICAL THERAPY | Age: 58
Discharge: HOME | End: 2018-02-24
Admitting: ORTHOPAEDIC SURGERY

## 2018-02-23 NOTE — FLOWSHEET NOTE
therapy to mobilize LE, proximal hip and/or LS spine soft tissue/joints for the purpose of modulating pain, promoting relaxation,  increasing ROM, reducing/eliminating soft tissue swelling/inflammation/restriction, improving soft tissue extensibility and allowing for proper ROM for normal function with self care, mobility, lifting and ambulation. Modalities:  Electrical stimulation for pain control. Waveform: Premod; Cycle Time: Continuous; Frequency: 80/150 Hz; Amplitude: 50-60 Volts; Treatment time: 10 min with cold pack. Charges:   Timed Code Treatment Minutes: 38   Total Treatment Minutes: 48     [] EVAL low  [x] BG(68170) x  2   [] IONTO  [] NMR (07943) x      [] VASO  [x] Manual (17778) x  1    [] Other:  [] TA x       [] Mech Traction (03445)  [] ES(attended) (18931)      [x] ES (un) (22072):     GOALS:  Patient stated goal: to be able to walk better and pain free    Therapist goals for Patient:   Short Term Goals: To be achieved in: 2 weeks  1. Independent in HEP and progression per patient tolerance, in order to prevent re-injury. -met 2/12/18  2. Patient will have a decrease in pain to facilitate improvement in movement, function, and ADLs as indicated by Functional Deficits. Long Term Goals: To be achieved in: 12 weeks  1. Disability index score of 50% or less for the LEFS to assist with reaching prior level of function. 2. Patient will demonstrate increased AROM to within 10-15 degrees of RLE to allow for proper joint functioning as indicated by patients Functional Deficits. 3. Patient will demonstrate an increase in Strength to good proximal hip strength and control, within 5lb HHD in LE to allow for proper functional mobility as indicated by patients Functional Deficits. 4. Patient will return to all household functional activities without increased symptoms or restriction.    5. Pt will be able to ambulate without assistive device or increase pain or symptoms (non-antalagic gait) (patient specific functional goal)       Progression Towards Functional goals:  [] Patient is progressing as expected towards functional goals listed. [x] Progression is slowed due to complexities listed. [] Progression has been slowed due to co-morbidities. [] Plan just implemented, too soon to assess goals progression  [] Other:     ASSESSMENT:  Good improvement in knee flexion this date (as measured on the EZ stretch).     Treatment/Activity Tolerance:  [x] Patient tolerated treatment well [] Patient limited by fatique  [] Patient limited by pain  [] Patient limited by other medical complications  [] Other:     Prognosis: [] Good [x] Fair  [] Poor    Patient Requires Follow-up: [x] Yes  [] No    PLAN: See eval  [x] Continue per plan of care [] Alter current plan (see comments)  [] Plan of care initiated [] Hold pending MD visit [] Discharge     Electronically signed by: Ericka Sanchez PT,DPT

## 2018-02-26 PROBLEM — R50.9 FEVER: Status: ACTIVE | Noted: 2018-02-26

## 2018-02-27 ENCOUNTER — TELEPHONE (OUTPATIENT)
Dept: ORTHOPEDIC SURGERY | Age: 58
End: 2018-02-27

## 2018-02-27 PROBLEM — Z98.890 H/O PROSTATE BIOPSY: Status: ACTIVE | Noted: 2018-02-27

## 2018-03-01 ENCOUNTER — HOSPITAL ENCOUNTER (OUTPATIENT)
Dept: PHYSICAL THERAPY | Age: 58
Discharge: HOME | End: 2018-03-31
Attending: ORTHOPAEDIC SURGERY | Admitting: ORTHOPAEDIC SURGERY

## 2018-03-01 ENCOUNTER — OFFICE VISIT (OUTPATIENT)
Dept: ORTHOPEDIC SURGERY | Age: 58
End: 2018-03-01

## 2018-03-01 DIAGNOSIS — S46.911D RIGHT SHOULDER STRAIN, SUBSEQUENT ENCOUNTER: Primary | ICD-10-CM

## 2018-03-01 PROCEDURE — 99214 OFFICE O/P EST MOD 30 MIN: CPT | Performed by: INTERNAL MEDICINE

## 2018-03-02 ENCOUNTER — HOSPITAL ENCOUNTER (OUTPATIENT)
Dept: PHYSICAL THERAPY | Age: 58
Discharge: HOME | End: 2018-03-03
Admitting: ORTHOPAEDIC SURGERY

## 2018-03-02 DIAGNOSIS — S46.911A STRAIN OF RIGHT SHOULDER, INITIAL ENCOUNTER: Primary | ICD-10-CM

## 2018-03-02 NOTE — FLOWSHEET NOTE
HR/TR Standing 3 10  x   Seated knee flex/ext  2 10 Loop resistance for home GTB x   Standing knee flex 2 10 Strap Assist x   Heel prop 5'  Performed concurrently w/ modalities x   Rocking on Bike 5'      EZ Stretch 15'  5'/5'/5'    TKE  2 10 BTB x   Incline S 1'      [facility] Flex, Abd, Ext 2 10ea 75#    HS Curls,  Quad  2  2 15  15 SL 45#  DL 55#                                Pt education: Pt  educated on POC and HEP. 10'      Manual Intervention       PROM (counter force w/ towels), patellar mobs, Tib-Fem post mobs/ant 8'                                         NMR re-education       SLS  B         Step up 4\" (ant/lat) 2 10ea Intermittent use of UE support for balance x   Step down 4\"  2 10 Good challenge                                         Therapeutic Exercise and NMR EXR   [x] (20206) Provided verbal/tactile cueing for activities related to strengthening, flexibility, endurance, ROM for improvements in LE, proximal hip, and core control with self care, mobility, lifting, ambulation.  [] (57064) Provided verbal/tactile cueing for activities related to improving balance, coordination, kinesthetic sense, posture, motor skill, proprioception  to assist with LE, proximal hip, and core control in self care, mobility, lifting, ambulation and eccentric single leg control.      NMR and Therapeutic Activities:    [x] (93587 or 69161) Provided verbal/tactile cueing for activities related to improving balance, coordination, kinesthetic sense, posture, motor skill, proprioception and motor activation to allow for proper function of core, proximal hip and LE with self care and ADLs  [] (71988) Gait Re-education- Provided training and instruction to the patient for proper LE, core and proximal hip recruitment and positioning and eccentric body weight control with ambulation re-education including up and down stairs     Home Exercise Program:    [x] (49518) Reviewed/Progressed HEP activities related to strengthening, flexibility, endurance, ROM of core, proximal hip and LE for functional self-care, mobility, lifting and ambulation/stair navigation   [] (03148)Reviewed/Progressed HEP activities related to improving balance, coordination, kinesthetic sense, posture, motor skill, proprioception of core, proximal hip and LE for self care, mobility, lifting, and ambulation/stair navigation      Manual Treatments:  PROM / STM / Oscillations-Mobs:  G-I, II, III, IV (PA's, Inf., Post.)  [x] (12166) Provided manual therapy to mobilize LE, proximal hip and/or LS spine soft tissue/joints for the purpose of modulating pain, promoting relaxation,  increasing ROM, reducing/eliminating soft tissue swelling/inflammation/restriction, improving soft tissue extensibility and allowing for proper ROM for normal function with self care, mobility, lifting and ambulation. Modalities:  Electrical stimulation for pain control. Waveform: Premod; Cycle Time: Continuous; Frequency: 80/150 Hz; Amplitude: 50-60 Volts; Treatment time: 10 min with cold pack. Charges:   Timed Code Treatment Minutes: 45   Total Treatment Minutes: 55     [] EVAL low  [x] RE(06748) x  1   [] IONTO  [x] NMR (45655) x  1   [] VASO  [x] Manual (54102) x  1    [] Other:  [] TA x       [] Mech Traction (77206)  [] ES(attended) (93429)      [x] ES (un) (68026):     GOALS:  Patient stated goal: to be able to walk better and pain free    Therapist goals for Patient:   Short Term Goals: To be achieved in: 2 weeks  1. Independent in HEP and progression per patient tolerance, in order to prevent re-injury. -met 2/12/18  2. Patient will have a decrease in pain to facilitate improvement in movement, function, and ADLs as indicated by Functional Deficits. Long Term Goals: To be achieved in: 12 weeks  1. Disability index score of 50% or less for the LEFS to assist with reaching prior level of function.    2. Patient will demonstrate increased AROM to within 10-15 degrees of RLE to allow

## 2018-03-06 ENCOUNTER — HOSPITAL ENCOUNTER (OUTPATIENT)
Dept: PHYSICAL THERAPY | Age: 58
Discharge: HOME | End: 2018-03-07
Admitting: ORTHOPAEDIC SURGERY

## 2018-03-06 NOTE — FLOWSHEET NOTE
[facility]    [address] [facility] [address]    Phone:  [phone]       Thank You for choosing us for your health care visit. We are glad to serve you and happy to provide you with this summary of your visit. Please help us to ensure we have accurate records. If you find anything that needs to be changed, please let our staff know as soon as possible.          Your Demographic Information     Patient Name Sex     Georgiana Andrew Female 1944       Ethnic Group Patient Race    Not of  or  Origin White      Your Visit Details     Date & Time Provider Department    2017 3:15 PM Adam Mtz DPM [facility]      Your Upcoming Appointment*(Max 10)     2017 10:30 AM CDT   Office Visit with Gisele Soto MD   [facility] ([facility])    [address] [place] [id number]   [phone]              We Ordered or Performed the Following     SHOE INSERT CUSTOM MODELED DIABETIC EACH       Conditions Discussed Today or Order-Related Diagnoses        Comments    Primary osteoarthritis of both feet    -  Primary     Pronation of both feet           Your Vitals Were     BP Pulse Temp Resp Height Weight    134/64 74 97.6 °F (36.4 °C) (Tympanic) 20 5' 8\" (1.727 m) 176 lb 5.9 oz (80 kg)    BMI Smoking Status                26.82 kg/m2 Never Smoker          Medications Prescribed or Re-Ordered Today     None      Your Current Medications Are        Disp Refills Start End    venlafaxine (EFFEXOR) 37.5 MG tablet 90 tablet 1 4/10/2017     Sig - Route: Take 1 tablet by mouth 3 times daily. - Oral    Class: Eprescribe    gabapentin (NEURONTIN) 100 MG capsule 90 capsule 1 2016     Sig - Route: Take 1 capsule by mouth daily. - Oral    Class: Eprescribe    lidocaine (LIDODERM) 5 % 30 patch 3 10/28/2016     Sig - Route: Place 1 patch onto the skin every 24 hours.  Remove patch 12 hours after applying - Transdermal    Class: Eprescribe    traMADOL (ULTRAM) 50 MG tablet 120 tablet 0 10/28/2016     Sig: Take 1 tablet by mouth four times daily as needed for pain    Notes to Pharmacy: Called to [facility]    COENZYME Q-10 PO        Sig - Route: Take 1 tablet by mouth daily. - Oral    Class: Historical Med    tiotropium (SPIRIVA HANDIHALER) 18 MCG inhalation capsule 30 capsule 2 2016     Sig - Route: Place 1 capsule into inhaler and inhale daily. - Inhalation    Class: Eprescribe    amLODIPine (NORVASC) 5 MG tablet        Sig - Route: Take 5 mg by mouth daily. Indications: High Blood Pressure - Oral    Class: Historical Med    diclofenac (VOLTAREN) 1 % gel        Sig - Route: Apply topically 4 times daily as needed. Apply to the vein on right leg for discomfort.  - Topical    Class: Historical Med    lisinopril (ZESTRIL) 10 MG tablet        Sig - Route: Take 10 mg by mouth daily. Take 1/2 tablet twice daily. - Oral    Class: Historical Med    Cholecalciferol (VITAMIN D) 2000 UNITS tablet        Sig - Route: Take 2,000 Units by mouth daily. - Oral    Class: Historical Med    MAGNESIUM OXIDE PO        Sig - Route: Take  by mouth daily. - Oral    Class: Historical Med    omeprazole 20 MG tablet 60 tablet 0 2014     Sig - Route: Take 2 tablets by mouth daily. - Oral    Biotin 1000 MCG TABS        Sig - Route: Take by mouth daily.  - Oral    Class: Historical Med    mometasone (NASONEX) 50 MCG/ACT nasal spray 17 g 12 2014     Sig - Route: Spray 2 sprays in each nostril daily. - Nasal    Class: Eprescribe    tacrolimus (PROGRAF) 1 MG capsule 120 capsule 3 2/3/2014     Simg in the am and 2mg in pm    Class: Historical Med    mycophenolate (CELLCEPT) 500 MG tablet   2/3/2014     Simg bid    Class: Historical Med    epoetin tadeo (PROCRIT) 69014 UNIT/ML injection 1 mL 0 2014     Sig - Route: Inject 1 mL into the skin once. - Subcutaneous    Class: Script Not  (26170)Reviewed/Progressed HEP activities related to improving balance, coordination, kinesthetic sense, posture, motor skill, proprioception of core, proximal hip and LE for self care, mobility, lifting, and ambulation/stair navigation      Manual Treatments:  PROM / STM / Oscillations-Mobs:  G-I, II, III, IV (PA's, Inf., Post.)  [] (35126) Provided manual therapy to mobilize LE, proximal hip and/or LS spine soft tissue/joints for the purpose of modulating pain, promoting relaxation,  increasing ROM, reducing/eliminating soft tissue swelling/inflammation/restriction, improving soft tissue extensibility and allowing for proper ROM for normal function with self care, mobility, lifting and ambulation. Modalities:  Electrical stimulation for pain control. Waveform: Premod; Cycle Time: Continuous; Frequency: 80/150 Hz; Amplitude: 50-60 Volts; Treatment time: 10 min with cold pack. Charges:   Timed Code Treatment Minutes: 50   Total Treatment Minutes: 60     [] EVAL low  [x] DV(60170) x  2   [] IONTO  [x] NMR (17719) x  1   [] VASO  [] Manual (70327) x       [] Other:  [] TA x       [] Mech Traction (58096)  [] ES(attended) (18507)      [x] ES (un) (97596):     GOALS:  Patient stated goal: to be able to walk better and pain free    Therapist goals for Patient:   Short Term Goals: To be achieved in: 2 weeks  1. Independent in HEP and progression per patient tolerance, in order to prevent re-injury. -met 2/12/18  2. Patient will have a decrease in pain to facilitate improvement in movement, function, and ADLs as indicated by Functional Deficits. -met    Long Term Goals: To be achieved in: 12 weeks  1. Disability index score of 50% or less for the LEFS to assist with reaching prior level of function. 2. Patient will demonstrate increased AROM to within 10-15 degrees of RLE to allow for proper joint functioning as indicated by patients Functional Deficits.    3. Patient will demonstrate an increase in Strength to Printed    Notes to Pharmacy: Pt comes in every two week to monthly depending on labs.  Possible adjust dose according to labs HGB needs to be <11.  Confirm with Dr. Soto at each injection.    Cosign for Ordering: Accepted by Gisele Soto MD on 1/7/2014 12:06 PM    PredniSONE, Major, 5 MG TABS   3/4/2013     Sig - Route: Take 5 mg by mouth Daily. - Oral    Class: Historical Med    aspirin 81 MG chewable tablet   2/21/2013     Sig - Route: Chew 81 mg by mouth daily. - Oral    Class: Historical Med    acetaminophen (TYLENOL) 325 MG tablet        Sig - Route: Take 650 mg by mouth every 4 hours as needed. - Oral    Class: Historical Med      Allergies     Lexapro [Escitalopram] Other (See Comments)    Suicidal thoughts    Allergy     Wool    Bee Sting SWELLING    Dapsone Dermatitis    Dust     Dust mites    Feldene [Piroxicam] HIVES    Skin breakdown - \"fingers peeling\"    Grass     Sulfa Antibiotics SWELLING    Lip swelling    Trees       Immunizations History as of 5/18/2017     Name Date    Epogen 12/9/2014  1:44 PM, 11/6/2014  3:36 PM, 7/29/2014, 6/26/2014  1:15 PM, 4/22/2014, 3/25/2014 10:18 AM, 3/4/2014 10:07 AM, 2/18/2014, 2/3/2014  3:00 PM, 1/7/2014 11:53 AM, 12/19/2013 10:41 AM, 11/21/2013, 10/29/2013, 10/10/2013, 10/7/2013, 8/14/2013, 7/22/2013, 6/26/2013, 6/7/2013, 5/29/2013, 5/13/2013, 4/29/2013, 4/15/2013, 4/2/2013, 1/30/2013    Hepatitis B Adult 10/22/2010, 4/9/2010, 2/26/2010    Influenza 10/1/2013    Influenza High Dose Pres Free 10/28/2016  9:50 AM    Pneumococcal Conjugate 13 Valent 6/9/2015 10:51 AM    Pneumococcal Polysaccharide Adult 4/25/2012    Td:Adult type tetanus/diphtheria 1/26/2007      Problem List as of 5/18/2017     Unspecified asthma    Esophageal reflux    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

## 2018-03-08 ENCOUNTER — HOSPITAL ENCOUNTER (OUTPATIENT)
Dept: PHYSICAL THERAPY | Age: 58
Discharge: HOME | End: 2018-03-09
Admitting: ORTHOPAEDIC SURGERY

## 2018-03-08 NOTE — FLOWSHEET NOTE
[facility]  Orthopaedics and Sports Rehabilitation, [facility]    Physical Therapy Daily Treatment Note  Date:  3/8/2018    Patient Name:  Amilcar Shi    :  1960  MRN: [id number]  Restrictions/Precautions:    Medical/Treatment Diagnosis Information:  Diagnosis: M17.12 (ICD-10-CM) - Unilateral primary osteoarthritis, left knee; S83.242A (ICD-10-CM) - Other tear of medial meniscus, current injury, left knee, initial encounter  Treatment Diagnosis: M25.562 L knee pain; M25.662 L knee stiffness; Z96.659 s/p L TKR DOS 3/07/79  Insurance/Certification information:  PT Insurance Information: 2018 [facility] Approved 4/10/18  Physician Information:  Referring Practitioner: Dr. Romi Bustos of care signed (Y/N): Y    Date of Patient follow up with Physician: 3/14/18    G-Code (if applicable):      Date G-Code Applied:         Progress Note: []  Yes  [x]  No  Next due by: Visit #10         Latex Allergy:  [x]NO      []YES  Preferred Language for Healthcare:   [x]English       []other:    Visit # Insurance Allowable   9 [facility] approved   (2-3x week 4-6 weeks) 4/10/18     Pain level:  3/10     SUBJECTIVE:  Pt reports compliance with his HEP multiple times per day. He continues to be compliant with the use of his EZ Stretch. He notes increased stiffness when he wakes up in the morning, but he tries to move around and walk around (which improves the stiffness). OBJECTIVE:   Observation: Gait w/ crutch non-antlagic, gait without crutch improving (still antalgic due to knee contracture). Test measurements: On EZ:  90 Knee Flex PROM, Neutral Ext PROM (3/8/18);  Within available range Grossly 4+/5 throughout    RESTRICTIONS/PRECAUTIONS: WBAT, HTN     Exercises/Interventions:     Therapeutic Ex Sets/sec Reps Notes HEP   Quad S 10\" 10  x   Gastroc S/HSS 30\" 2 B x   Heel slide 5\" 30  x   SLR 3 10 2# x   Mini-squats  x   HR/TR Standing 3 10  x   Seated knee flex/ext  3 10 BlackTBx   Standing knee flex x   Heel prop 10' Performed concurrently w/ modalities x   Rocking on Bike 5'      EZ Stretch 15'  5'/5'/5'    x   Incline S 30\" 2     CORY Flex, Abd, TKE 3 10ea 90#/90#/90#    HS Curls,  Quad  2  2 15  15 SL 50#  SL 50#    Leg Press 2 15 120# SL                         Pt education: Pt  educated on POC and HEP. 5'      Manual Intervention       PROM (counter force w/ towels), patellar mobs, Tib-Fem post mobs/ant                                          NMR re-education       SLS          Step up 6\" (ant/lat) 3 10ea  x   Step down 4\"     Sit to stand 2 10ea Elevated chair, trying to WB equally                                  Therapeutic Exercise and NMR EXR   [x] (21449) Provided verbal/tactile cueing for activities related to strengthening, flexibility, endurance, ROM for improvements in LE, proximal hip, and core control with self care, mobility, lifting, ambulation.  [] (37709) Provided verbal/tactile cueing for activities related to improving balance, coordination, kinesthetic sense, posture, motor skill, proprioception  to assist with LE, proximal hip, and core control in self care, mobility, lifting, ambulation and eccentric single leg control.      NMR and Therapeutic Activities:    [x] (90087 or 46532) Provided verbal/tactile cueing for activities related to improving balance, coordination, kinesthetic sense, posture, motor skill, proprioception and motor activation to allow for proper function of core, proximal hip and LE with self care and ADLs  [] (80670) Gait Re-education- Provided training and instruction to the patient for proper LE, core and proximal hip recruitment and positioning and eccentric body weight control with ambulation re-education including up and down stairs     Home Exercise Program:    [x] (20267) Reviewed/Progressed HEP activities related to strengthening, flexibility, endurance, ROM of core, proximal hip and LE for functional self-care, mobility, lifting and ambulation/stair navigation   []

## 2018-03-12 ENCOUNTER — HOSPITAL ENCOUNTER (OUTPATIENT)
Dept: PHYSICAL THERAPY | Age: 58
Discharge: HOME | End: 2018-03-13
Admitting: ORTHOPAEDIC SURGERY

## 2018-03-12 NOTE — FLOWSHEET NOTE
40#    Leg Press 2 15 120# SL                         Pt education: Pt  educated on POC and HEP. 5'      Manual Intervention       PROM (counter force w/ towels), patellar mobs, Tib-Fem post mobs/ant 8'                                         NMR re-education       SLS          Step up 6\" (ant/lat) 2 10ea  x   Step down 4\"     Sit to stand 2 10ea Elevated chair (4\"), trying to WB equally (decreased momentum needed this visit!); LLE posterior/RLE anterior biased                                  Therapeutic Exercise and NMR EXR   [x] (34779) Provided verbal/tactile cueing for activities related to strengthening, flexibility, endurance, ROM for improvements in LE, proximal hip, and core control with self care, mobility, lifting, ambulation.  [] (32690) Provided verbal/tactile cueing for activities related to improving balance, coordination, kinesthetic sense, posture, motor skill, proprioception  to assist with LE, proximal hip, and core control in self care, mobility, lifting, ambulation and eccentric single leg control.      NMR and Therapeutic Activities:    [x] (43585 or 23560) Provided verbal/tactile cueing for activities related to improving balance, coordination, kinesthetic sense, posture, motor skill, proprioception and motor activation to allow for proper function of core, proximal hip and LE with self care and ADLs  [] (91525) Gait Re-education- Provided training and instruction to the patient for proper LE, core and proximal hip recruitment and positioning and eccentric body weight control with ambulation re-education including up and down stairs     Home Exercise Program:    [x] (17024) Reviewed/Progressed HEP activities related to strengthening, flexibility, endurance, ROM of core, proximal hip and LE for functional self-care, mobility, lifting and ambulation/stair navigation   [] (86353)Reviewed/Progressed HEP activities related to improving balance, coordination, kinesthetic sense, posture, motor skill,

## 2018-03-12 NOTE — OP NOTE
due to knee stiffness (also would be appropriate to have cane, but doesn't currently own one). He has made substantial strength gains and functional gains (\"can do pretty much everything\") from new ROM available, but remains stiffer than clinically expected for this many weeks post-op. Pt would continue to benefit from skilled PT intervention to address ROM deficits and functional deficits as above. Summary:   [] Patient is progressing as expected for this condition   [x] Patient is progressing, but slower than expected for this condition   [] Patient is not progressing     Clinician Recommendations:   [x] Continue rehabilitation due to objective improvement and continued functional deficits. [] Follow up periodically to advance home exercise program to match level of function. [] Continue rehabitation due to objective improvement and continued functional deficits with progression to work conditioning. [] Discharge to post-rehab program secondary to maximizing \"medical necessity\" of physical / occupational therapy.    [] Discharge independent in a home program as:  [] All goals achieved  [] Maximized \"medical necessity\" of physical / occupational therapy  [] No subjective or objective improvements    Plan: Continue 2x per week 12 weeks (depending on if manipulation is deemed necessary)  Electronically signed by: Marie Ackerman, PT ,DPT    Physician Recommendations:  [] Follow treatment plan as above [] Discontinue physical therapy  [] Change plan to: _______________________________________________________________    [] DONTA ([phone])  [x] EGO ([phone])  [] AND ([phone])          Fax   [phone]                       Fax  [phone]                  Fax  [phone]              [] [address] [phone]  [] [facility] ([phone])  [] MEG ([phone])       Fax   [phone]                   Fax  [phone]                        Fax   [phone]

## 2018-03-14 ENCOUNTER — OFFICE VISIT (OUTPATIENT)
Dept: ORTHOPEDIC SURGERY | Age: 58
End: 2018-03-14

## 2018-03-14 VITALS
SYSTOLIC BLOOD PRESSURE: 130 MMHG | BODY MASS INDEX: 33.05 KG/M2 | WEIGHT: 244 LBS | HEART RATE: 85 BPM | HEIGHT: 72 IN | DIASTOLIC BLOOD PRESSURE: 79 MMHG

## 2018-03-14 DIAGNOSIS — Z96.652 STATUS POST TOTAL LEFT KNEE REPLACEMENT: Primary | ICD-10-CM

## 2018-03-14 PROCEDURE — 99024 POSTOP FOLLOW-UP VISIT: CPT | Performed by: ORTHOPAEDIC SURGERY

## 2018-03-16 ENCOUNTER — HOSPITAL ENCOUNTER (OUTPATIENT)
Dept: PHYSICAL THERAPY | Age: 58
Discharge: HOME | End: 2018-03-17
Admitting: ORTHOPAEDIC SURGERY

## 2018-03-16 NOTE — FLOWSHEET NOTE
[facility]  Orthopaedics and Sports Rehabilitation, [facility]    Physical Therapy Daily Treatment Note  Date:  3/16/2018    Patient Name:  Hank Whitt    :  1960  MRN: [id number]  Restrictions/Precautions:    Medical/Treatment Diagnosis Information:  Diagnosis: M17.12 (ICD-10-CM) - Unilateral primary osteoarthritis, left knee; S83.242A (ICD-10-CM) - Other tear of medial meniscus, current injury, left knee, initial encounter  Treatment Diagnosis: M25.562 L knee pain; M25.662 L knee stiffness; Z96.659 s/p L TKR DOS   Insurance/Certification information:  PT Insurance Information:  [facility] Approved 4/10/18  Physician Information:  Referring Practitioner: Dr. Camilla Pena of care signed (Y/N): Y      Date of Patient follow up with Physician:     G-Code (if applicable):      Date G-Code Applied:         Progress Note: []  Yes  [x]  No  Next due by: Visit #10         Latex Allergy:  [x]NO      []YES  Preferred Language for Healthcare:   [x]English       []other:    Visit # Insurance Allowable   11 [facility] approved   (2-3x week 4-6 weeks) 4/10/18     Pain level:  2/10     SUBJECTIVE:  Pt reports continued compliance with his HEP and EZ Stretch. Pt reports per MD, he will not be pursuing a manipulation, due to the progress he has been having in PT.    OBJECTIVE:   Observation: Gait w/ crutch non-antlagic, gait without crutch improving (still antalgic due to knee contracture). Test measurements: On EZ:  95 Knee Flex PROM, Neutral Ext PROM (3/16/18);  Within available range Grossly 4+/5 throughout    RESTRICTIONS/PRECAUTIONS: WBAT, HTN     Exercises/Interventions:     Therapeutic Ex Sets/sec Reps Notes HEP   Quad S  x   Gastroc S/HSS B x   Heel slide  x   SLR 3# x   Mini-squats  x   HR/TR Standing 3 10  x   Seated knee flex/ext  x   Standing knee flex x   Heel prop    x   Rocking on Bike    EZ Stretch 15'  5'/5'/5'    x   Incline S 30\" 2     CORY Flex, Abd, TKE 3 10ea 95#/95#/95#    HS Curls,  Quad core, proximal hip and LE for self care, mobility, lifting, and ambulation/stair navigation      Manual Treatments:  PROM / STM / Oscillations-Mobs:  G-I, II, III, IV (PA's, Inf., Post.)  [x] (76106) Provided manual therapy to mobilize LE, proximal hip and/or LS spine soft tissue/joints for the purpose of modulating pain, promoting relaxation,  increasing ROM, reducing/eliminating soft tissue swelling/inflammation/restriction, improving soft tissue extensibility and allowing for proper ROM for normal function with self care, mobility, lifting and ambulation. Modalities:      Heat pack x10' (improved functional knee mobility following)    Charges:   Timed Code Treatment Minutes: 50   Total Treatment Minutes: 60     [] EVAL low  [x] OW(61326) x  1   [] IONTO  [x] NMR (06403) x  1   [] VASO   [x] Manual (99157) x  1    [] Other:  [] TA x       [] Mech Traction (55811)  [] ES(attended) (49605)      [] ES (un) (66760):     GOALS:  Patient stated goal: to be able to walk better and pain free    Therapist goals for Patient:   Short Term Goals: To be achieved in: 2 weeks  1. Independent in HEP and progression per patient tolerance, in order to prevent re-injury. -met 2/12/18  2. Patient will have a decrease in pain to facilitate improvement in movement, function, and ADLs as indicated by Functional Deficits. -met     Long Term Goals: To be achieved in: 12 weeks  1. Disability index score of 50% or less for the LEFS to assist with reaching prior level of function. 2. Patient will demonstrate increased AROM to within 10-15 degrees of RLE to allow for proper joint functioning as indicated by patients Functional Deficits. 3. Patient will demonstrate an increase in Strength to good proximal hip strength and control, within 5lb HHD in LE to allow for proper functional mobility as indicated by patients Functional Deficits.-in progress 3/8/18  4.  Patient will return to all household functional activities without increased

## 2018-03-19 ENCOUNTER — HOSPITAL ENCOUNTER (OUTPATIENT)
Dept: PHYSICAL THERAPY | Age: 58
Discharge: HOME | End: 2018-03-20
Admitting: ORTHOPAEDIC SURGERY

## 2018-03-19 ENCOUNTER — OFFICE VISIT (OUTPATIENT)
Dept: ORTHOPEDIC SURGERY | Age: 58
End: 2018-03-19

## 2018-03-19 DIAGNOSIS — S46.911D SHOULDER STRAIN, RIGHT, SUBSEQUENT ENCOUNTER: Primary | ICD-10-CM

## 2018-03-19 PROCEDURE — 99213 OFFICE O/P EST LOW 20 MIN: CPT | Performed by: INTERNAL MEDICINE

## 2018-03-19 RX ORDER — MELOXICAM 15 MG/1
15 TABLET ORAL DAILY
Qty: 30 TABLET | Refills: 5 | Status: SHIPPED | OUTPATIENT
Start: 2018-03-19

## 2018-03-19 NOTE — FLOWSHEET NOTE
Physical Therapist Assistant Activity Sheet    Date:  3/19/2018    Patient Name:  Marily Cox    :  1960  MRN: [id number]  Restrictions/Precautions:    Medical/Treatment Diagnosis Information:  ·   Diagnosis: M17.12 (ICD-10-CM) - Unilateral primary osteoarthritis, left knee; S83.242A (ICD-10-CM) - Other tear of medial meniscus, current injury, left knee, initial encounter  ·   Treatment Diagnosis: M25.562 L knee pain; M25.662 L knee stiffness; Z96.659 s/p L TKR DOS 18  Physician Information:     Referring Practitioner: Dr. Álvaro Wilson  8 wks  12 wks 16 wks 20 wks   24 wks                            Activities                                                DOS/DOI:                                                    Date: 3/19/18    Bike    Elliptical    Treadmill    Airdyne        Gastroc stretch    Soleus stretch    Hamstring stretch    ITB stretch    Hip Flexor stretch    Quad stretch    Adductor stretch        Weight Shifting sp                              fp                              tp    Lateral walking (with/w/o TB) Orange x 25m       Balance: PEP/Zoe board                   SLS          Star excursion load/explode          Extremity reach UE/LE        Leg Press Delfino. Ecc.                      Inv. Calf Press Delfino. Ecc.                      Inv.        CORY   Flex               ABd               ADd               TKE               Ext        Steps  Up                Up and Over                Down                Lateral                Staircase X 15 low, x 10 high       Squats:  Mini                   Wall SB x 20                  BOSU        Lunges:  Lunge to Balance                   Balance to Lunge                   Walking        Knee Extension Bilat. Ecc.                               Inv. Hamstring Curls Bilat.                                 Ecc.                                Inv.

## 2018-03-19 NOTE — PROGRESS NOTES
reexamination. This dictation was performed with a verbal recognition program (DRAGON) and it was checked for errors. It is possible that there are still dictated errors within this office note. If so, please bring any errors to my attention for an addendum. All efforts were made to ensure that this office note is accurate.

## 2018-03-19 NOTE — FLOWSHEET NOTE
[facility]  Orthopaedics and Sports Rehabilitation, [facility] [place]    Physical Therapy Daily Treatment Note  Date:  3/19/2018    Patient Name:  Darlin Cruz    :  1960  MRN: [id number]  Restrictions/Precautions:    Medical/Treatment Diagnosis Information:  Diagnosis: M17.12 (ICD-10-CM) - Unilateral primary osteoarthritis, left knee; S83.242A (ICD-10-CM) - Other tear of medial meniscus, current injury, left knee, initial encounter  Treatment Diagnosis: M25.562 L knee pain; M25.662 L knee stiffness; Z96.659 s/p L TKR DOS 30  Insurance/Certification information:  PT Insurance Information: 2018 [facility] Approved 4/10/18  Physician Information:  Referring Practitioner: Dr. Abdulkadir Aguirre of care signed (Y/N): Y      Date of Patient follow up with Physician:     G-Code (if applicable):      Date G-Code Applied:         Progress Note: []  Yes  [x]  No  Next due by: Visit #10         Latex Allergy:  [x]NO      []YES  Preferred Language for Healthcare:   [x]English       []other:    Visit # Insurance Allowable   12 [facility] approved   (2-3x week 4-6 weeks) 4/10/18     Pain level:  2/10 (lateral gastroc)     SUBJECTIVE:  Pt reports continued compliance with his HEP and EZ Stretch. He reports lateral calf soreness from walking and exercise. OBJECTIVE:   Observation: Gait w/ crutch non-antlagic, gait without crutch improving (still antalgic due to knee contracture). Test measurements: On EZ:  95 Knee Flex PROM, Neutral Ext PROM (3/19/18);  Within available range Grossly 4+/5 throughout    RESTRICTIONS/PRECAUTIONS: WBAT, HTN     Exercises/Interventions:     Therapeutic Ex Sets/sec Reps Notes HEP   Quad S  x   Gastroc S/HSS B x   Heel slide  x   SLR 3# x   Mini-squats  x   HR/TR Standing  x   Seated knee flex/ext  x   Standing knee flex x   Heel prop    x   Rocking on Bike 5'   EZ Stretch 15'  5'/5'/5'    x   Incline S     CORY Flex, Abd, TKE 3 10ea 95#/95#/95#    HS Curls,  Quad  2  2 15  15 SL 55#  SL 55#    Leg Press 2 15 120# SL  160#DL    SB wall squat  20 w/PTA    See PTA Note                     Pt education: Pt  educated on POC and HEP. 5'      Manual Intervention       PROM (counter force w/ towels), patellar mobs, Tib-Fem post mobs/ant 10' Use of heat to loosen up the joint prior                                        NMR re-education       SLS / Tandem         Step up 8\" (ant/lat)  x   Step down 4\"     Sit to stand Elevated chair (2\") LLE posterior/RLE anterior biased    LBW   OPB w/ PTA    Staircase   w/PTA                    Therapeutic Exercise and NMR EXR   [x] (22337) Provided verbal/tactile cueing for activities related to strengthening, flexibility, endurance, ROM for improvements in LE, proximal hip, and core control with self care, mobility, lifting, ambulation.  [] (02090) Provided verbal/tactile cueing for activities related to improving balance, coordination, kinesthetic sense, posture, motor skill, proprioception  to assist with LE, proximal hip, and core control in self care, mobility, lifting, ambulation and eccentric single leg control.      NMR and Therapeutic Activities:    [x] (27075 or 97301) Provided verbal/tactile cueing for activities related to improving balance, coordination, kinesthetic sense, posture, motor skill, proprioception and motor activation to allow for proper function of core, proximal hip and LE with self care and ADLs  [] (09090) Gait Re-education- Provided training and instruction to the patient for proper LE, core and proximal hip recruitment and positioning and eccentric body weight control with ambulation re-education including up and down stairs     Home Exercise Program:    [x] (09686) Reviewed/Progressed HEP activities related to strengthening, flexibility, endurance, ROM of core, proximal hip and LE for functional self-care, mobility, lifting and ambulation/stair navigation   [] (12398)Reviewed/Progressed HEP activities related to improving balance, coordination,

## 2018-03-23 ENCOUNTER — HOSPITAL ENCOUNTER (OUTPATIENT)
Dept: PHYSICAL THERAPY | Age: 58
Discharge: HOME | End: 2018-03-24
Admitting: ORTHOPAEDIC SURGERY

## 2018-03-23 NOTE — FLOWSHEET NOTE
self-care, mobility, lifting and ambulation/stair navigation   [] (69574)Reviewed/Progressed HEP activities related to improving balance, coordination, kinesthetic sense, posture, motor skill, proprioception of core, proximal hip and LE for self care, mobility, lifting, and ambulation/stair navigation      Manual Treatments:  PROM / STM / Oscillations-Mobs:  G-I, II, III, IV (PA's, Inf., Post.)  [] (00039) Provided manual therapy to mobilize LE, proximal hip and/or LS spine soft tissue/joints for the purpose of modulating pain, promoting relaxation,  increasing ROM, reducing/eliminating soft tissue swelling/inflammation/restriction, improving soft tissue extensibility and allowing for proper ROM for normal function with self care, mobility, lifting and ambulation. Modalities:      Heat pack x10' (improved functional knee mobility following)    Charges:   Timed Code Treatment Minutes: 40   Total Treatment Minutes: 50     [] EVAL low  [x] OQ(08100) x  1   [] IONTO  [x] NMR (74513) x  2   [] VASO   [] Manual (19835) x       [] Other:  [] TA x       [] Mech Traction (85183)  [] ES(attended) (01431)      [] ES (un) (66723):     GOALS:  Patient stated goal: to be able to walk better and pain free    Therapist goals for Patient:   Short Term Goals: To be achieved in: 2 weeks  1. Independent in HEP and progression per patient tolerance, in order to prevent re-injury. -met 2/12/18  2. Patient will have a decrease in pain to facilitate improvement in movement, function, and ADLs as indicated by Functional Deficits. -met     Long Term Goals: To be achieved in: 12 weeks  1. Disability index score of 50% or less for the LEFS to assist with reaching prior level of function. 2. Patient will demonstrate increased AROM to within 10-15 degrees of RLE to allow for proper joint functioning as indicated by patients Functional Deficits.    3. Patient will demonstrate an increase in Strength to good proximal hip strength and control,

## 2018-03-26 ENCOUNTER — HOSPITAL ENCOUNTER (OUTPATIENT)
Dept: PHYSICAL THERAPY | Age: 58
Discharge: HOME | End: 2018-03-27
Admitting: ORTHOPAEDIC SURGERY

## 2018-03-26 ENCOUNTER — TELEPHONE (OUTPATIENT)
Dept: ORTHOPEDIC SURGERY | Age: 58
End: 2018-03-26

## 2018-03-26 NOTE — FLOWSHEET NOTE
[facility]  Orthopaedics and Sports Rehabilitation, [facility]    Physical Therapy Daily Treatment Note  Date:  3/26/2018    Patient Name:  Hank Whitt    :  1960  MRN: [id number]  Restrictions/Precautions:    Medical/Treatment Diagnosis Information:  Diagnosis: M17.12 (ICD-10-CM) - Unilateral primary osteoarthritis, left knee; S83.242A (ICD-10-CM) - Other tear of medial meniscus, current injury, left knee, initial encounter  Treatment Diagnosis: M25.562 L knee pain; M25.662 L knee stiffness; Z96.659 s/p L TKR DOS   Insurance/Certification information:  PT Insurance Information:  [facility] Approved 4/10/18  Physician Information:  Referring Practitioner: Dr. Camilla Pena of care signed (Y/N): Y      Date of Patient follow up with Physician:     G-Code (if applicable):      Date G-Code Applied:         Progress Note: []  Yes  [x]  No  Next due by: Visit #10         Latex Allergy:  [x]NO      []YES  Preferred Language for Healthcare:   [x]English       []other:    Visit # Insurance Allowable   [facility] approved   (2-3x week 4-6 weeks) 4/10/18     Pain level:  2/10 soreness     SUBJECTIVE:  Pt reports he has been progressing well at home. OBJECTIVE:   Observation:  gait without crutch improving (still antalgic due to knee contracture). Test measurements: On EZ:  101 Knee Flex PROM, Neutral Ext PROM (3/23/18);  Within available range Grossly 4+/5 throughout    RESTRICTIONS/PRECAUTIONS: WBAT, HTN      Exercises/Interventions:     Therapeutic Ex Sets/sec Reps Notes HEP   Quad S  x   Gastroc S/HSS B x   Heel slide 5\" 10 w/OP and heat x   SLR 3# x   Mini-squats 1 10ea PB at ankle, PB at knee flex/ext x   HR/TR Standing 3 10  x   Seated knee flex/ext  x   Standing knee flex x   Heel prop    x   Rocking on Bike 5'   EZ Stretch 15'  5'/5'/5'    x   Incline S 30\" 2     CORY Flex, Abd, TKE    HS Curls,  Quad  2  2 15  15 SL 55#  SL 55#    Leg Press 2 15 140# SL      SB wall squat of core, proximal hip and LE for self care, mobility, lifting, and ambulation/stair navigation      Manual Treatments:  PROM / STM / Oscillations-Mobs:  G-I, II, III, IV (PA's, Inf., Post.)  [] (51060) Provided manual therapy to mobilize LE, proximal hip and/or LS spine soft tissue/joints for the purpose of modulating pain, promoting relaxation,  increasing ROM, reducing/eliminating soft tissue swelling/inflammation/restriction, improving soft tissue extensibility and allowing for proper ROM for normal function with self care, mobility, lifting and ambulation. Modalities:      Heat pack x10' (improved functional knee mobility following)    Charges:   Timed Code Treatment Minutes: 55   Total Treatment Minutes: 55     [] EVAL low  [x] TT(70852) x  2   [] IONTO  [x] NMR (14095) x  1   [] VASO   [] Manual (04720) x  1    [] Other:  [] TA x       [] Mech Traction (09679)  [] ES(attended) (74736)      [] ES (un) (67232):     GOALS:  Patient stated goal: to be able to walk better and pain free    Therapist goals for Patient:   Short Term Goals: To be achieved in: 2 weeks  1. Independent in HEP and progression per patient tolerance, in order to prevent re-injury. -met 2/12/18  2. Patient will have a decrease in pain to facilitate improvement in movement, function, and ADLs as indicated by Functional Deficits. -met     Long Term Goals: To be achieved in: 12 weeks  1. Disability index score of 50% or less for the LEFS to assist with reaching prior level of function. 2. Patient will demonstrate increased AROM to within 10-15 degrees of RLE to allow for proper joint functioning as indicated by patients Functional Deficits. 3. Patient will demonstrate an increase in Strength to good proximal hip strength and control, within 5lb HHD in LE to allow for proper functional mobility as indicated by patients Functional Deficits.-in progress 3/8/18  4.  Patient will return to all household functional activities without increased symptoms or restriction. -in progress 3/8/18  5. Pt will be able to ambulate without assistive device or increase pain or symptoms (non-antalagic gait) (patient specific functional goal)  -in progress 3/8/18    Progression Towards Functional goals:  [] Patient is progressing as expected towards functional goals listed. [x] Progression is slowed due to complexities listed (delayed start to outpatient PT due to insurance, prolonged hospital stay). [] Progression has been slowed due to co-morbidities. [] Plan just implemented, too soon to assess goals progression  [] Other:     ASSESSMENT: Knee slightly stiffer this date. Will continue to work on function mobility and aggressive ROM activities. Good challenge with love walking activity.     Treatment/Activity Tolerance:  [x] Patient tolerated treatment well [] Patient limited by fatique  [] Patient limited by pain  [] Patient limited by other medical complications   [] Other:     Prognosis: [] Good [x] Fair  [] Poor    Patient Requires Follow-up: [x] Yes  [] No     PLAN: See eval. 2x per week   [x] Continue per plan of care [] Alter current plan (see comments)  [] Plan of care initiated [] Hold pending MD visit [] Discharge     Electronically signed by: Dustin Shaver PT,DPT

## 2018-03-26 NOTE — TELEPHONE ENCOUNTER
gave patient updates on WC case,pending addendum to diagnosis and inj. Instructed pt to ama appt to begin PT at [facility], they will submit C9.

## 2018-03-30 ENCOUNTER — HOSPITAL ENCOUNTER (OUTPATIENT)
Dept: PHYSICAL THERAPY | Age: 58
Discharge: HOME | End: 2018-03-31
Admitting: ORTHOPAEDIC SURGERY

## 2018-04-01 ENCOUNTER — HOSPITAL ENCOUNTER (OUTPATIENT)
Dept: PHYSICAL THERAPY | Age: 58
Discharge: HOME | End: 2018-04-30
Attending: ORTHOPAEDIC SURGERY | Admitting: ORTHOPAEDIC SURGERY

## 2018-04-02 ENCOUNTER — HOSPITAL ENCOUNTER (OUTPATIENT)
Dept: PHYSICAL THERAPY | Age: 58
Discharge: HOME | End: 2018-04-03
Admitting: ORTHOPAEDIC SURGERY

## 2018-04-06 ENCOUNTER — HOSPITAL ENCOUNTER (OUTPATIENT)
Dept: PHYSICAL THERAPY | Age: 58
Discharge: HOME | End: 2018-04-07
Admitting: ORTHOPAEDIC SURGERY

## 2018-04-09 ENCOUNTER — HOSPITAL ENCOUNTER (OUTPATIENT)
Dept: PHYSICAL THERAPY | Age: 58
Discharge: HOME | End: 2018-04-10
Admitting: ORTHOPAEDIC SURGERY

## 2018-04-10 ENCOUNTER — HOSPITAL ENCOUNTER (OUTPATIENT)
Dept: PHYSICAL THERAPY | Age: 58
Discharge: HOME | End: 2018-04-30
Admitting: INTERNAL MEDICINE

## 2018-04-13 ENCOUNTER — HOSPITAL ENCOUNTER (OUTPATIENT)
Dept: PHYSICAL THERAPY | Age: 58
Discharge: HOME | End: 2018-04-14
Admitting: ORTHOPAEDIC SURGERY

## 2018-04-16 ENCOUNTER — HOSPITAL ENCOUNTER (OUTPATIENT)
Dept: PHYSICAL THERAPY | Age: 58
Discharge: HOME | End: 2018-04-17
Admitting: ORTHOPAEDIC SURGERY

## 2018-04-17 ENCOUNTER — HOSPITAL ENCOUNTER (OUTPATIENT)
Dept: PHYSICAL THERAPY | Age: 58
Discharge: HOME | End: 2018-04-18
Admitting: ORTHOPAEDIC SURGERY

## 2018-04-18 ENCOUNTER — OFFICE VISIT (OUTPATIENT)
Dept: ORTHOPEDIC SURGERY | Age: 58
End: 2018-04-18

## 2018-04-18 VITALS — BODY MASS INDEX: 33.05 KG/M2 | HEIGHT: 72 IN | WEIGHT: 244 LBS

## 2018-04-18 DIAGNOSIS — Z96.652 STATUS POST TOTAL LEFT KNEE REPLACEMENT: Primary | ICD-10-CM

## 2018-04-18 PROCEDURE — 99024 POSTOP FOLLOW-UP VISIT: CPT | Performed by: ORTHOPAEDIC SURGERY

## 2018-04-19 ENCOUNTER — HOSPITAL ENCOUNTER (OUTPATIENT)
Dept: PHYSICAL THERAPY | Age: 58
Discharge: HOME | End: 2018-04-20
Admitting: ORTHOPAEDIC SURGERY

## 2018-04-20 ENCOUNTER — HOSPITAL ENCOUNTER (OUTPATIENT)
Dept: PHYSICAL THERAPY | Age: 58
Discharge: HOME | End: 2018-04-21
Admitting: ORTHOPAEDIC SURGERY

## 2018-04-23 ENCOUNTER — HOSPITAL ENCOUNTER (OUTPATIENT)
Dept: PHYSICAL THERAPY | Age: 58
Discharge: HOME | End: 2018-04-24
Admitting: ORTHOPAEDIC SURGERY

## 2018-04-24 ENCOUNTER — HOSPITAL ENCOUNTER (OUTPATIENT)
Dept: PHYSICAL THERAPY | Age: 58
Discharge: HOME | End: 2018-04-25
Admitting: ORTHOPAEDIC SURGERY

## 2018-04-26 ENCOUNTER — HOSPITAL ENCOUNTER (OUTPATIENT)
Dept: PHYSICAL THERAPY | Age: 58
Discharge: HOME | End: 2018-04-27
Admitting: ORTHOPAEDIC SURGERY

## 2018-04-27 ENCOUNTER — HOSPITAL ENCOUNTER (OUTPATIENT)
Dept: PHYSICAL THERAPY | Age: 58
Discharge: HOME | End: 2018-04-28
Admitting: ORTHOPAEDIC SURGERY

## 2018-05-01 ENCOUNTER — HOSPITAL ENCOUNTER (OUTPATIENT)
Dept: PHYSICAL THERAPY | Age: 58
Discharge: HOME | End: 2018-05-31
Attending: INTERNAL MEDICINE | Admitting: INTERNAL MEDICINE

## 2018-05-01 ENCOUNTER — HOSPITAL ENCOUNTER (OUTPATIENT)
Dept: PHYSICAL THERAPY | Age: 58
Discharge: HOME | End: 2018-05-02
Admitting: ORTHOPAEDIC SURGERY

## 2018-05-01 ENCOUNTER — HOSPITAL ENCOUNTER (OUTPATIENT)
Dept: PHYSICAL THERAPY | Age: 58
Discharge: HOME | End: 2018-05-31
Attending: ORTHOPAEDIC SURGERY | Admitting: ORTHOPAEDIC SURGERY

## 2018-05-03 ENCOUNTER — HOSPITAL ENCOUNTER (OUTPATIENT)
Dept: PHYSICAL THERAPY | Age: 58
Discharge: HOME | End: 2018-05-04
Admitting: ORTHOPAEDIC SURGERY

## 2018-05-04 ENCOUNTER — HOSPITAL ENCOUNTER (OUTPATIENT)
Dept: PHYSICAL THERAPY | Age: 58
Discharge: HOME | End: 2018-05-05
Admitting: ORTHOPAEDIC SURGERY

## 2018-05-11 ENCOUNTER — HOSPITAL ENCOUNTER (OUTPATIENT)
Dept: PHYSICAL THERAPY | Age: 58
Discharge: HOME | End: 2018-05-12
Admitting: ORTHOPAEDIC SURGERY

## 2018-05-18 ENCOUNTER — HOSPITAL ENCOUNTER (OUTPATIENT)
Dept: PHYSICAL THERAPY | Age: 58
Discharge: HOME | End: 2018-05-19
Admitting: ORTHOPAEDIC SURGERY

## 2018-05-21 ENCOUNTER — HOSPITAL ENCOUNTER (OUTPATIENT)
Dept: PHYSICAL THERAPY | Age: 58
Discharge: HOME | End: 2018-05-22
Admitting: ORTHOPAEDIC SURGERY

## 2018-05-24 ENCOUNTER — HOSPITAL ENCOUNTER (OUTPATIENT)
Dept: PHYSICAL THERAPY | Age: 58
Discharge: HOME | End: 2018-05-25
Admitting: ORTHOPAEDIC SURGERY

## 2018-05-25 ENCOUNTER — HOSPITAL ENCOUNTER (OUTPATIENT)
Dept: PHYSICAL THERAPY | Age: 58
Discharge: HOME | End: 2018-05-26
Admitting: ORTHOPAEDIC SURGERY

## 2018-05-30 ENCOUNTER — HOSPITAL ENCOUNTER (OUTPATIENT)
Dept: PHYSICAL THERAPY | Age: 58
Discharge: HOME | End: 2018-05-31
Admitting: ORTHOPAEDIC SURGERY

## 2018-06-01 ENCOUNTER — HOSPITAL ENCOUNTER (OUTPATIENT)
Dept: PHYSICAL THERAPY | Age: 58
Discharge: HOME | End: 2018-06-30
Attending: ORTHOPAEDIC SURGERY | Admitting: ORTHOPAEDIC SURGERY

## 2018-06-04 ENCOUNTER — HOSPITAL ENCOUNTER (OUTPATIENT)
Dept: PHYSICAL THERAPY | Age: 58
Discharge: HOME | End: 2018-06-05
Admitting: ORTHOPAEDIC SURGERY

## 2018-06-06 ENCOUNTER — HOSPITAL ENCOUNTER (OUTPATIENT)
Dept: PHYSICAL THERAPY | Age: 58
Discharge: HOME | End: 2018-06-07
Admitting: ORTHOPAEDIC SURGERY

## 2018-06-11 ENCOUNTER — HOSPITAL ENCOUNTER (OUTPATIENT)
Dept: PHYSICAL THERAPY | Age: 58
Discharge: HOME | End: 2018-06-12
Admitting: ORTHOPAEDIC SURGERY

## 2018-06-14 ENCOUNTER — HOSPITAL ENCOUNTER (OUTPATIENT)
Dept: PHYSICAL THERAPY | Age: 58
Discharge: HOME | End: 2018-06-15
Admitting: ORTHOPAEDIC SURGERY

## 2018-06-15 ENCOUNTER — HOSPITAL ENCOUNTER (OUTPATIENT)
Dept: PHYSICAL THERAPY | Age: 58
Discharge: HOME | End: 2018-06-16
Admitting: ORTHOPAEDIC SURGERY

## 2018-06-18 ENCOUNTER — HOSPITAL ENCOUNTER (OUTPATIENT)
Dept: PHYSICAL THERAPY | Age: 58
Discharge: HOME | End: 2018-06-19
Admitting: ORTHOPAEDIC SURGERY

## 2018-06-19 ENCOUNTER — HOSPITAL ENCOUNTER (OUTPATIENT)
Dept: PHYSICAL THERAPY | Age: 58
Discharge: HOME | End: 2018-06-20
Admitting: ORTHOPAEDIC SURGERY

## 2018-07-01 ENCOUNTER — HOSPITAL ENCOUNTER (OUTPATIENT)
Dept: PHYSICAL THERAPY | Age: 58
Discharge: HOME | End: 2018-07-01
Attending: ORTHOPAEDIC SURGERY | Admitting: ORTHOPAEDIC SURGERY

## 2018-07-07 ENCOUNTER — HOSPITAL ENCOUNTER (OUTPATIENT)
Dept: OTHER | Age: 58
Discharge: HOME | End: 2018-07-07
Attending: NURSE PRACTITIONER | Admitting: NURSE PRACTITIONER

## 2018-07-07 LAB
A/G RATIO: 1.4 (ref 1.1–2.2)
ALBUMIN SERPL-MCNC: 4.3 G/DL (ref 3.4–5)
ALP BLD-CCNC: 109 U/L (ref 40–129)
ALT SERPL-CCNC: 32 U/L (ref 10–40)
ANION GAP SERPL CALCULATED.3IONS-SCNC: 15 MMOL/L (ref 3–16)
AST SERPL-CCNC: 34 U/L (ref 15–37)
BILIRUB SERPL-MCNC: 0.8 MG/DL (ref 0–1)
BUN BLDV-MCNC: 14 MG/DL (ref 7–20)
CALCIUM SERPL-MCNC: 8.9 MG/DL (ref 8.3–10.6)
CHLORIDE BLD-SCNC: 101 MMOL/L (ref 99–110)
CO2: 21 MMOL/L (ref 21–32)
CREAT SERPL-MCNC: 0.6 MG/DL (ref 0.9–1.3)
GFR AFRICAN AMERICAN: >60
GFR NON-AFRICAN AMERICAN: >60
GLOBULIN: 3 G/DL
GLUCOSE BLD-MCNC: 109 MG/DL (ref 70–99)
POTASSIUM SERPL-SCNC: 4.7 MMOL/L (ref 3.5–5.1)
SODIUM BLD-SCNC: 137 MMOL/L (ref 136–145)
TOTAL PROTEIN: 7.3 G/DL (ref 6.4–8.2)

## 2018-07-11 ENCOUNTER — OFFICE VISIT (OUTPATIENT)
Dept: ORTHOPEDIC SURGERY | Age: 58
End: 2018-07-11

## 2018-07-11 VITALS — WEIGHT: 244 LBS | BODY MASS INDEX: 33.05 KG/M2 | HEIGHT: 72 IN

## 2018-07-11 DIAGNOSIS — Z96.652 STATUS POST TOTAL LEFT KNEE REPLACEMENT: Primary | ICD-10-CM

## 2018-07-11 PROCEDURE — 99213 OFFICE O/P EST LOW 20 MIN: CPT | Performed by: ORTHOPAEDIC SURGERY

## 2018-07-11 RX ORDER — MELOXICAM 15 MG/1
15 TABLET ORAL DAILY
Qty: 30 TABLET | Refills: 5 | Status: SHIPPED | OUTPATIENT
Start: 2018-07-11

## 2018-07-11 NOTE — PROGRESS NOTES
Chief Complaint  Knee Pain (LEFT    SX 01/16/2018 TKR)    left knee status post total knee arthroplasty. Date of surgery: 1/16/2018    History of Present Illness:  Fidel Sewell is a 56 y.o. male here for 6 month follow-up of his left total knee arthroplasty. That was performed in January 2018. He continues to show improvement. He now can reach full extension and is walking independently. It appears that the easy stretch machine was effective in improving his range of motion. Vital Signs: There were no vitals filed for this visit. Left Knee Examination:  Inspection:  No gross deformities noted. Swelling is significantly improved. Palpation:  He has no tenderness to palpation today around the knee joint. There is no effusion. He still has some swelling in his calf but it is much softer and much less swelling today. Range of Motion: Today he can reach full extension and flexes to about 100. Strength: Improving strength noted    Special Tests:  No instability is noted today. Skin: There are no rashes, ulcerations or lesions. Gait: I think his gait is dramatically improved as well. Still has a slight antalgic gait. Assessment :  left knee status post total knee arthroplasty. Gradual improvement. Impression:  Encounter Diagnosis   Name Primary?  Status post total left knee replacement Yes       Office Procedures:  No orders of the defined types were placed in this encounter. Treatment Plan: Today I talked to him about his improvement. I think it's continuing to get better. We do have to put a limit on when we thinks he is maximally medically improved. Today I have estimated that date to be about 6 weeks from now so: September 1, 2018. He will continue to work on improving strength walking and increasing his flexion. This dictation was performed with a verbal recognition program (DRAGON) and it was checked for errors.  It is possible that there are still

## 2018-07-17 RX ORDER — AMOXICILLIN 500 MG/1
TABLET, FILM COATED ORAL
Qty: 4 TABLET | Refills: 3 | Status: SHIPPED | OUTPATIENT
Start: 2018-07-17

## 2018-07-26 ENCOUNTER — HOSPITAL ENCOUNTER (OUTPATIENT)
Dept: CT IMAGING | Age: 58
Discharge: HOME | End: 2018-07-26
Payer: COMMERCIAL

## 2018-07-26 DIAGNOSIS — R31.1 BENIGN MICROSCOPIC HEMATURIA: ICD-10-CM

## 2018-07-26 PROCEDURE — 74178 CT ABD&PLV WO CNTR FLWD CNTR: CPT

## 2018-07-26 PROCEDURE — 6360000004 HC RX CONTRAST MEDICATION: Performed by: PHYSICIAN ASSISTANT

## 2018-07-26 RX ADMIN — IOPAMIDOL 100 ML: 755 INJECTION, SOLUTION INTRAVENOUS at 15:49

## 2018-08-06 ENCOUNTER — OFFICE VISIT (OUTPATIENT)
Dept: ORTHOPEDIC SURGERY | Age: 58
End: 2018-08-06

## 2018-08-06 DIAGNOSIS — S46.011D STRAIN OF RIGHT ROTATOR CUFF CAPSULE, SUBSEQUENT ENCOUNTER: Primary | ICD-10-CM

## 2018-08-06 DIAGNOSIS — M75.41 ROTATOR CUFF IMPINGEMENT SYNDROME, RIGHT: ICD-10-CM

## 2018-08-06 PROCEDURE — 20611 DRAIN/INJ JOINT/BURSA W/US: CPT | Performed by: INTERNAL MEDICINE

## 2018-08-06 PROCEDURE — 99213 OFFICE O/P EST LOW 20 MIN: CPT | Performed by: INTERNAL MEDICINE

## 2018-08-06 NOTE — PROGRESS NOTES
Chief Complaint:   Chief Complaint   Patient presents with    Shoulder Pain     f/u R shoulder pain          History of Present Illness:       Patient is a 56 y.o. male returns follow up for the above complaint. The patient was last seen approximately 3.5 monthsago. The symptoms show no change since the last visit. The patient has had further testing for the problem. In the interim MRI was completed which is outlined below. In the interim his claim was addended to reflect rotator cuff impingement and ultrasound guided steroid injection has been approved    Continues to have an element of nocturnal pain positional nature and pain that seems to follow rotator cuff impingement provocative pattern. He denies any correlation of pain with neck range of motion    No new injuries no new events he does not report any new onset or progressive weakness of the upper extremity     Past Medical History:        Past Medical History:   Diagnosis Date    Acute cholecystitis     Arthritis     Hypertension     Kidney stones     Reflux     Umbilical hernia     Wears glasses     for reading        Present Medications:         Current Outpatient Prescriptions   Medication Sig Dispense Refill    Amoxicillin 500 MG TABS Take 4 tablets one hour prior to dental procedure 4 tablet 3    meloxicam (MOBIC) 15 MG tablet Take 1 tablet by mouth daily 30 tablet 5    meloxicam (MOBIC) 15 MG tablet Take 1 tablet by mouth daily 30 tablet 5    tamsulosin (FLOMAX) 0.4 MG capsule Take 1 capsule by mouth daily 30 capsule 3    amLODIPine (NORVASC) 5 MG tablet Take 5 mg by mouth daily        No current facility-administered medications for this visit. Allergies:      No Known Allergies        Review of Systems:    Pertinent items are noted in HPI       Vital Signs: There were no vitals filed for this visit.      General Exam:     Constitutional: Patient is adequately groomed with no evidence of malnutrition    Physical Exam: right shoulder      Primary Exam:    Inspection:  No deformity atrophy or appreciable effusion      Palpation:  No focal tenderness      Range of Motion:  Full range and symmetric pain in extremes of forward elevation      Strength:  Normal low-grade resisted pain external rotation      Special Tests:  Neer's impingement positive, Simon impingement equivocal      Skin: There are no rashes, ulcerations or lesions. Gait: Nonantalgic    Neurovascular - non focal and intact         Additional Comments:        Additional Examinations:                   Office Imaging Results/Procedures PerformedToday:     Logic E ultrasound/ 10 HZ      Ultrasound-guided injection of the shoulder    The patient was placed supine on the examination table with the       RT       upper extremity slightly internally rotated. The ultrasound was placed on shoulder present function image optimization was obtained using the linear transducer. The transducer was placed in a coronal oblique position over the anterolateral shoulder and the rotator cuff was easily identifiable. After sterile preparation and the use of topical anesthetic, 25-gauge needle was advanced using longitudinal technique just superficial to the rotator cuff tendon and the subacromial bursa was hydrodissected with 4 mL of Marcaine and  2 mL of Celestone. The patient tolerated this with minimal discomfort. Band-Aid tooth puncture wound. Technically successful injection    Postinjection definite anesthetic response     Office Procedures:   No orders of the defined types were placed in this encounter.           Other Outside Imaging and Testing Personally Reviewed:        ite: [employer] [facility] #: [id number] #: [id number] Procedure: MR Right Shoulder joint w/o Contrast ; Reason for Exam: Dx: evaluate for high-grade rotator cuff pathology/tear/intra-articular; B24.083Y strain of right shoulder, initial encounter   This document is confidential medical dictated with voice recognition software. Though review and correction are routine, we apologize for any errors. \"

## 2018-08-15 ENCOUNTER — OFFICE VISIT (OUTPATIENT)
Dept: ORTHOPEDIC SURGERY | Age: 58
End: 2018-08-15

## 2018-08-15 VITALS
HEIGHT: 72 IN | WEIGHT: 244 LBS | DIASTOLIC BLOOD PRESSURE: 75 MMHG | HEART RATE: 64 BPM | SYSTOLIC BLOOD PRESSURE: 130 MMHG | BODY MASS INDEX: 33.05 KG/M2

## 2018-08-15 DIAGNOSIS — M25.561 RIGHT KNEE PAIN, UNSPECIFIED CHRONICITY: Primary | ICD-10-CM

## 2018-08-15 PROCEDURE — 99213 OFFICE O/P EST LOW 20 MIN: CPT | Performed by: ORTHOPAEDIC SURGERY

## 2018-08-24 ENCOUNTER — TELEPHONE (OUTPATIENT)
Dept: ORTHOPEDIC SURGERY | Age: 58
End: 2018-08-24

## 2018-08-29 ENCOUNTER — HOSPITAL ENCOUNTER (EMERGENCY)
Age: 58
Discharge: HOME | End: 2018-08-30
Payer: COMMERCIAL

## 2018-08-29 DIAGNOSIS — N20.1 RIGHT URETERAL STONE: Primary | ICD-10-CM

## 2018-08-29 PROCEDURE — 99284 EMERGENCY DEPT VISIT MOD MDM: CPT

## 2018-08-29 NOTE — LETTER
[facility]  ED  [address] [address]  Phone: [phone]  Fax: [phone]               August 30, 2018    Patient: Rose Dutton   YOB: 1960   Date of Visit: 8/29/2018       To Whom It May Concern:    Blanca Napier was seen and treated in our emergency department on 8/29/2018. He may return to work on 8/31/2018.     Sincerely,       Alka Vasquez RN         Signature:__________________________________

## 2018-08-30 ENCOUNTER — APPOINTMENT (OUTPATIENT)
Dept: CT IMAGING | Age: 58
End: 2018-08-30
Payer: COMMERCIAL

## 2018-08-30 VITALS
RESPIRATION RATE: 14 BRPM | BODY MASS INDEX: 33.05 KG/M2 | DIASTOLIC BLOOD PRESSURE: 94 MMHG | WEIGHT: 244 LBS | OXYGEN SATURATION: 96 % | SYSTOLIC BLOOD PRESSURE: 149 MMHG | HEIGHT: 72 IN | TEMPERATURE: 97.8 F | HEART RATE: 88 BPM

## 2018-08-30 LAB
A/G RATIO: 1.4 (ref 1.1–2.2)
ALBUMIN SERPL-MCNC: 4.4 G/DL (ref 3.4–5)
ALP BLD-CCNC: 113 U/L (ref 40–129)
ALT SERPL-CCNC: 50 U/L (ref 10–40)
ANION GAP SERPL CALCULATED.3IONS-SCNC: 14 MMOL/L (ref 3–16)
AST SERPL-CCNC: 27 U/L (ref 15–37)
BACTERIA: ABNORMAL /HPF
BILIRUB SERPL-MCNC: 0.7 MG/DL (ref 0–1)
BILIRUBIN URINE: NEGATIVE
BLOOD, URINE: ABNORMAL
BUN BLDV-MCNC: 10 MG/DL (ref 7–20)
CALCIUM SERPL-MCNC: 9.1 MG/DL (ref 8.3–10.6)
CHLORIDE BLD-SCNC: 100 MMOL/L (ref 99–110)
CLARITY: CLEAR
CO2: 25 MMOL/L (ref 21–32)
COLOR: YELLOW
CREAT SERPL-MCNC: 0.9 MG/DL (ref 0.9–1.3)
GFR AFRICAN AMERICAN: >60
GFR NON-AFRICAN AMERICAN: >60
GLOBULIN: 3.1 G/DL
GLUCOSE BLD-MCNC: 118 MG/DL (ref 70–99)
GLUCOSE URINE: NEGATIVE MG/DL
HCT VFR BLD CALC: 45.7 % (ref 40.5–52.5)
HEMOGLOBIN: 15.5 G/DL (ref 13.5–17.5)
KETONES, URINE: NEGATIVE MG/DL
LEUKOCYTE ESTERASE, URINE: NEGATIVE
MCH RBC QN AUTO: 28.7 PG (ref 26–34)
MCHC RBC AUTO-ENTMCNC: 33.8 G/DL (ref 31–36)
MCV RBC AUTO: 84.8 FL (ref 80–100)
MICROSCOPIC EXAMINATION: YES
MUCUS: ABNORMAL /LPF
NITRITE, URINE: NEGATIVE
PDW BLD-RTO: 13.5 % (ref 12.4–15.4)
PH UA: 6
PLATELET # BLD: 249 K/UL (ref 135–450)
PMV BLD AUTO: 8 FL (ref 5–10.5)
POTASSIUM SERPL-SCNC: 3.6 MMOL/L (ref 3.5–5.1)
PROTEIN UA: NEGATIVE MG/DL
RBC # BLD: 5.39 M/UL (ref 4.2–5.9)
RBC UA: ABNORMAL /HPF (ref 0–2)
SODIUM BLD-SCNC: 139 MMOL/L (ref 136–145)
SPECIFIC GRAVITY UA: 1.02
TOTAL PROTEIN: 7.5 G/DL (ref 6.4–8.2)
URINE TYPE: ABNORMAL
UROBILINOGEN, URINE: 0.2 E.U./DL
WBC # BLD: 12.1 K/UL (ref 4–11)
WBC UA: ABNORMAL /HPF (ref 0–5)

## 2018-08-30 PROCEDURE — 74177 CT ABD & PELVIS W/CONTRAST: CPT

## 2018-08-30 PROCEDURE — 6370000000 HC RX 637 (ALT 250 FOR IP)

## 2018-08-30 PROCEDURE — 80053 COMPREHEN METABOLIC PANEL: CPT

## 2018-08-30 PROCEDURE — 6360000002 HC RX W HCPCS: Performed by: NURSE PRACTITIONER

## 2018-08-30 PROCEDURE — 85027 COMPLETE CBC AUTOMATED: CPT

## 2018-08-30 PROCEDURE — 96374 THER/PROPH/DIAG INJ IV PUSH: CPT

## 2018-08-30 PROCEDURE — 81001 URINALYSIS AUTO W/SCOPE: CPT

## 2018-08-30 PROCEDURE — 96375 TX/PRO/DX INJ NEW DRUG ADDON: CPT

## 2018-08-30 PROCEDURE — 6360000004 HC RX CONTRAST MEDICATION: Performed by: NURSE PRACTITIONER

## 2018-08-30 RX ORDER — MORPHINE SULFATE 4 MG/ML
4 INJECTION, SOLUTION INTRAMUSCULAR; INTRAVENOUS
Status: COMPLETED | OUTPATIENT
Start: 2018-08-30 | End: 2018-08-30

## 2018-08-30 RX ORDER — TAMSULOSIN HYDROCHLORIDE 0.4 MG/1
CAPSULE ORAL
Status: COMPLETED
Start: 2018-08-30 | End: 2018-08-30

## 2018-08-30 RX ORDER — KETOROLAC TROMETHAMINE 10 MG/1
10 TABLET, FILM COATED ORAL EVERY 6 HOURS PRN
Qty: 15 TABLET | Refills: 0 | Status: SHIPPED | OUTPATIENT
Start: 2018-08-30

## 2018-08-30 RX ORDER — KETOROLAC TROMETHAMINE 30 MG/ML
30 INJECTION, SOLUTION INTRAMUSCULAR; INTRAVENOUS ONCE
Status: COMPLETED | OUTPATIENT
Start: 2018-08-30 | End: 2018-08-30

## 2018-08-30 RX ORDER — TAMSULOSIN HYDROCHLORIDE 0.4 MG/1
0.4 CAPSULE ORAL DAILY
Status: DISCONTINUED | OUTPATIENT
Start: 2018-08-30 | End: 2018-08-30 | Stop reason: HOSPADM

## 2018-08-30 RX ORDER — ONDANSETRON 2 MG/ML
4 INJECTION INTRAMUSCULAR; INTRAVENOUS ONCE
Status: COMPLETED | OUTPATIENT
Start: 2018-08-30 | End: 2018-08-30

## 2018-08-30 RX ADMIN — KETOROLAC TROMETHAMINE 30 MG: 30 INJECTION, SOLUTION INTRAMUSCULAR at 01:25

## 2018-08-30 RX ADMIN — TAMSULOSIN HYDROCHLORIDE 0.4 MG: 0.4 CAPSULE ORAL at 02:40

## 2018-08-30 RX ADMIN — MORPHINE SULFATE 4 MG: 4 INJECTION, SOLUTION INTRAMUSCULAR; INTRAVENOUS at 00:31

## 2018-08-30 RX ADMIN — MORPHINE SULFATE 4 MG: 4 INJECTION, SOLUTION INTRAMUSCULAR; INTRAVENOUS at 00:59

## 2018-08-30 RX ADMIN — ONDANSETRON HYDROCHLORIDE 4 MG: 2 INJECTION, SOLUTION INTRAMUSCULAR; INTRAVENOUS at 00:31

## 2018-08-30 RX ADMIN — IOPAMIDOL 75 ML: 755 INJECTION, SOLUTION INTRAVENOUS at 01:40

## 2018-08-30 ASSESSMENT — PAIN SCALES - GENERAL
PAINLEVEL_OUTOF10: 10

## 2018-08-30 ASSESSMENT — PAIN DESCRIPTION - LOCATION: LOCATION: FLANK

## 2018-08-30 ASSESSMENT — PAIN DESCRIPTION - FREQUENCY: FREQUENCY: CONTINUOUS

## 2018-08-30 ASSESSMENT — PAIN DESCRIPTION - PROGRESSION: CLINICAL_PROGRESSION: GRADUALLY WORSENING

## 2018-08-30 ASSESSMENT — PAIN DESCRIPTION - ORIENTATION: ORIENTATION: RIGHT

## 2018-08-30 ASSESSMENT — PAIN DESCRIPTION - DESCRIPTORS: DESCRIPTORS: ACHING;THROBBING

## 2018-08-30 ASSESSMENT — PAIN DESCRIPTION - PAIN TYPE: TYPE: ACUTE PAIN

## 2018-08-30 ASSESSMENT — PAIN DESCRIPTION - ONSET: ONSET: ON-GOING

## 2018-08-30 NOTE — ED NOTES
Patient pain 10/10.   Patient states that the pain was a little better 7/10 but now back up to 10/10     Ritchie Beckford RN  08/30/18 7509

## 2018-09-02 NOTE — ED PROVIDER NOTES
[address] [place] [address]  ED  eMERGENCY dEPARTMENT eNCOUnter        Pt Name: Marcelino Ramos  MRN: [id number]  [place] 1960  Date of evaluation: 8/29/2018  Provider: GENE Hernandez - ELLEN-C  PCP: Fabián Willams MD      History provided by the patient. CHIEF COMPLAINT:     Chief Complaint   Patient presents with    Flank Pain     states right sided flank pain, Tuesday had lithotripsy done, PMD states pain in wrong place for kidney stones, patient states pain is very bad       HISTORY OF PRESENT ILLNESS:      Marcelino Ramos is a 66 y.o. male who presents to [address] [place] [address]  ED with complaints of right flank pain. Patient states that he had lithotripsy done on Tuesday with urology. States that they removed a stone and some up in the kidney. Patient states that he was doing fine and was pain free until earlier today when he developed sharp right flank pain. Patient denies fever. He states he cant get comfortable. Patient has had some hematuria however has had it since the procedure. He is here for further evaluation. Nursing Notes were reviewed     REVIEW OF SYSTEMS:     Review of Systems  All systems, a total of 10, are reviewed and negative except for those that were just noted in history present illness.         PAST MEDICAL HISTORY:     Past Medical History:   Diagnosis Date    Acute cholecystitis     Arthritis     Hypertension     Kidney stones     Reflux     Umbilical hernia     Wears glasses     for reading         SURGICAL HISTORY:      Past Surgical History:   Procedure Laterality Date    COLONOSCOPY      HERNIA REPAIR      umbilical    KNEE ARTHROSCOPY Left 8/31/2012    left knee meniscus    KNEE SURGERY Left 01/16/2018    LEFT TOTAL KNEE REPLACEMENT               LITHOTRIPSY      NECK SURGERY      PROSTATE BIOPSY  02/20/2018    Dr Yvonne Jon:       Discharge Medication List as of 8/30/2018  2:15 AM      CONTINUE these normal, without discharge. TMs clear bilaterally. No nasal discharge. Oropharynx clear, no erythema. Mucous membranes moist.  EYES: Conjunctiva non-injected, no lid abnormalities noted. No scleral icterus. PERRL. EOM's grossly intact. Anterior chambers clear. NECK: Supple. Normal ROM. No meningismus. No thyroid tenderness or swelling noted. CARDIOVASCULAR: RRR. No Murmer. Intact distal pulses with no edema. No carotid bruits. PULMONARY/CHEST WALL: Effort normal. No tachypnea. Lungs clear to ausculation. ABDOMEN: Normal BS. Soft. Nondistended. No tenderness to palpate. No guarding. No hernias noted. No splenomegaly. Back: Spine is midline. No ecchymosis. No crepitus on palpation. No obvious subluxation of vertebral column. No saddle anesthesia or evidence of cauda equina. There is right CVA tenderness. /ANORECTAL: Not assessed  MUSKULOSKELETAL: Normal ROM. No acute deformities. No edema. No tenderness to palpate. SKIN: Warm and dry. NEUROLOGICAL:  GCS 15. CN II-XII grossly intact. Strength is 5/5 in all extremities and sensation is intact. DTRs normal and symmetric. PSYCHIATRIC: Normal affect, normal insight and judgement. Alert and oriented x 3.         DIAGNOSTIC RESULTS:     LABS:    Results for orders placed or performed during the hospital encounter of 08/29/18   CBC   Result Value Ref Range    WBC 12.1 (H) 4.0 - 11.0 K/uL    RBC 5.39 4.20 - 5.90 M/uL    Hemoglobin 15.5 13.5 - 17.5 g/dL    Hematocrit 45.7 40.5 - 52.5 %    MCV 84.8 80.0 - 100.0 fL    MCH 28.7 26.0 - 34.0 pg    MCHC 33.8 31.0 - 36.0 g/dL    RDW 13.5 12.4 - 15.4 %    Platelets 017 364 - 765 K/uL    MPV 8.0 5.0 - 10.5 fL   Comprehensive metabolic panel   Result Value Ref Range    Sodium 139 136 - 145 mmol/L    Potassium 3.6 3.5 - 5.1 mmol/L    Chloride 100 99 - 110 mmol/L    CO2 25 21 - 32 mmol/L    Anion Gap 14 3 - 16    Glucose 118 (H) 70 - 99 mg/dL    BUN 10 7 - 20 mg/dL    CREATININE 0.9 0.9 - 1.3 mg/dL    GFR Non-African American >60 >60    GFR African American >60 >60    Calcium 9.1 8.3 - 10.6 mg/dL    Total Protein 7.5 6.4 - 8.2 g/dL    Alb 4.4 3.4 - 5.0 g/dL    Albumin/Globulin Ratio 1.4 1.1 - 2.2    Total Bilirubin 0.7 0.0 - 1.0 mg/dL    Alkaline Phosphatase 113 40 - 129 U/L    ALT 50 (H) 10 - 40 U/L    AST 27 15 - 37 U/L    Globulin 3.1 g/dL   Urinalysis   Result Value Ref Range    Color, UA Yellow Straw/Yellow    Clarity, UA Clear Clear    Glucose, Ur Negative Negative mg/dL    Bilirubin Urine Negative Negative    Ketones, Urine Negative Negative mg/dL    Specific Gravity, UA 1.020 1.005 - 1.030    Blood, Urine LARGE (A) Negative    pH, UA 6.0 5.0 - 8.0    Protein, UA Negative Negative mg/dL    Urobilinogen, Urine 0.2 <2.0 E.U./dL    Nitrite, Urine Negative Negative    Leukocyte Esterase, Urine Negative Negative    Microscopic Examination YES     Urine Type Not Specified    Microscopic Urinalysis   Result Value Ref Range    Mucus, UA Rare (A) /LPF    WBC, UA 0-2 0 - 5 /HPF    RBC, UA 10-20 (A) 0 - 2 /HPF    Bacteria, UA 1+ (A) /HPF         RADIOLOGY:  All x-ray studies are viewed/reviewed by me. Formal interpretations per the radiologist are as follows:      CT ABDOMEN PELVIS W IV CONTRAST Additional Contrast? None   Final Result   5 mm obstructing stone in the proximal right ureter just past the UPJ with   mild upstream hydronephrosis. Additional nonobstructive right intrarenal   stones. EKG:  See EKG interpretation by an attending physician.       PROCEDURES:   N/A    CRITICAL CARE TIME:   N/A    CONSULTS:  None      EMERGENCY DEPARTMENT COURSE and DIFFERENTIAL DIAGNOSIS/MDM:   Vitals:    Vitals:    08/30/18 0005 08/30/18 0028 08/30/18 0148   BP:  (!) 179/99 (!) 149/94   Pulse: 110  88   Resp: 14     Temp: 97.8 °F (36.6 °C)     TempSrc: Oral     SpO2: 97%  96%   Weight: 244 lb (110.7 kg)     Height: 6' (1.829 m)         Patient was given the following medications:  Medications   morphine (PF) injection 4 mg (4 mg recognition program.  Efforts were made to edit the dictations, but occasionally words are mis-transcribed.)    GENE Jara CNP-C (electronically signed)        GENE Jara CNP  09/01/18 2046

## 2018-09-06 ENCOUNTER — TELEPHONE (OUTPATIENT)
Dept: ORTHOPEDIC SURGERY | Age: 58
End: 2018-09-06

## 2018-09-06 NOTE — TELEPHONE ENCOUNTER
Received a call from [facility] at the [address] [facility] checking on the status of a response to the [address] [place] [address] letter that was scanned in to media on 8/24/18. [facility] can be reached at [phone].

## 2018-09-07 ENCOUNTER — TELEPHONE (OUTPATIENT)
Dept: ORTHOPEDIC SURGERY | Age: 58
End: 2018-09-07

## 2018-10-04 ENCOUNTER — OFFICE VISIT (OUTPATIENT)
Dept: ORTHOPEDIC SURGERY | Age: 58
End: 2018-10-04
Payer: COMMERCIAL

## 2018-10-04 VITALS — WEIGHT: 244 LBS | HEIGHT: 72 IN | BODY MASS INDEX: 33.05 KG/M2

## 2018-10-04 DIAGNOSIS — M75.41 ROTATOR CUFF IMPINGEMENT SYNDROME, RIGHT: Primary | ICD-10-CM

## 2018-10-04 DIAGNOSIS — S46.011D STRAIN OF RIGHT ROTATOR CUFF CAPSULE, SUBSEQUENT ENCOUNTER: ICD-10-CM

## 2018-10-04 PROCEDURE — 99214 OFFICE O/P EST MOD 30 MIN: CPT | Performed by: INTERNAL MEDICINE

## 2018-10-04 NOTE — PROGRESS NOTES
groomed with no evidence of malnutrition    Physical Exam: right shoulder      Primary Exam:    Inspection:  No deformity atrophy or appreciable effusion      Palpation:  There is asymmetric and focal tenderness over the Acromial clavicular joint      Range of Motion:  Stable change from previous there is pain with extremes for elevation AB duction and with horizontal adduction      Strength:  Acromialclavicular provocative positive      Special Tests:  Impingement sign negative      Skin: There are no rashes, ulcerations or lesions. Gait: Minimally antalgic    Neurovascular - non focal and intact       Additional Comments:        Additional Examinations:                   Office Imaging Results/Procedures PerformedToday:   MRI was reference from previous      Site: [facility] #: [id number] #: [id number] Procedure: MR Right Shoulder joint w/o Contrast ; Reason for Exam: Dx: evaluate for high-grade rotator cuff pathology/tear/intra-articular; P15.563Q strain of right shoulder, initial encounter   This document is confidential medical information.  Unauthorized disclosure or use of this information is prohibited by law. If you are not the intended recipient of this document, please advise us by calling immediately [phone].       [facility]   [address]           Patient Name: Keiry Muniz   Case ID: [id number]   Patient : 1960   Referring Physician: Michelle Maria MD   Exam Date: 2018   Exam Description: MR Right Shoulder joint w/o Contrast            HISTORY: 47-year-old male with right shoulder pain and decreased range of motion. Evaluate for    rotator cuff pathology.        TECHNICAL FACTORS: Axial BSG water-excitation 3D, sagittal T2 fat-sat and coronal T1 and PD    fat-sat sequences were performed through the right shoulder without contrast administration.     1.5 HF Oval.        FINDINGS: Apparent attenuation/thinning of a portion of injection more definitive options could incleude surgical intervention. Orders:      No orders of the defined types were placed in this encounter. Elen Meeks MD.      Disclaimer: \"This note was dictated with voice recognition software. Though review and correction are routine, we apologize for any errors. \"

## 2018-10-05 ENCOUNTER — TELEPHONE (OUTPATIENT)
Dept: ORTHOPEDIC SURGERY | Age: 58
End: 2018-10-05

## 2018-10-22 ENCOUNTER — OFFICE VISIT (OUTPATIENT)
Dept: ORTHOPEDIC SURGERY | Age: 58
End: 2018-10-22
Payer: COMMERCIAL

## 2018-10-22 VITALS — WEIGHT: 244 LBS | HEIGHT: 72 IN | BODY MASS INDEX: 33.05 KG/M2

## 2018-10-22 DIAGNOSIS — Z96.652 STATUS POST TOTAL LEFT KNEE REPLACEMENT: Primary | ICD-10-CM

## 2018-10-22 PROCEDURE — 99213 OFFICE O/P EST LOW 20 MIN: CPT | Performed by: ORTHOPAEDIC SURGERY

## 2018-10-23 ENCOUNTER — HOSPITAL ENCOUNTER (OUTPATIENT)
Dept: CT IMAGING | Age: 58
Discharge: HOME | End: 2018-10-23
Payer: COMMERCIAL

## 2018-10-23 DIAGNOSIS — Z87.442 H/O CALCULUS OF KIDNEY DURING PREGNANCY: ICD-10-CM

## 2018-10-23 DIAGNOSIS — N20.1 CALCULUS OF URETER: ICD-10-CM

## 2018-10-23 DIAGNOSIS — Z87.59 H/O CALCULUS OF KIDNEY DURING PREGNANCY: ICD-10-CM

## 2018-10-23 DIAGNOSIS — N23 BILATERAL RENAL COLIC: ICD-10-CM

## 2018-10-23 PROCEDURE — 74176 CT ABD & PELVIS W/O CONTRAST: CPT

## 2018-11-08 ENCOUNTER — OFFICE VISIT (OUTPATIENT)
Dept: ORTHOPEDIC SURGERY | Age: 58
End: 2018-11-08
Payer: COMMERCIAL

## 2018-11-08 VITALS — HEIGHT: 76 IN | WEIGHT: 244 LBS | BODY MASS INDEX: 29.71 KG/M2

## 2018-11-08 DIAGNOSIS — S46.911D RIGHT SHOULDER STRAIN, SUBSEQUENT ENCOUNTER: ICD-10-CM

## 2018-11-08 DIAGNOSIS — M19.019 OSTEOARTHRITIS OF AC (ACROMIOCLAVICULAR) JOINT: Primary | ICD-10-CM

## 2018-11-08 DIAGNOSIS — M75.41 ROTATOR CUFF IMPINGEMENT SYNDROME, RIGHT: ICD-10-CM

## 2018-11-08 PROCEDURE — 99213 OFFICE O/P EST LOW 20 MIN: CPT | Performed by: INTERNAL MEDICINE

## 2018-11-12 ENCOUNTER — TELEPHONE (OUTPATIENT)
Dept: ORTHOPEDIC SURGERY | Age: 58
End: 2018-11-12

## 2018-11-15 ENCOUNTER — TELEPHONE (OUTPATIENT)
Dept: ORTHOPEDIC SURGERY | Age: 58
End: 2018-11-15

## 2018-12-06 ENCOUNTER — OFFICE VISIT (OUTPATIENT)
Dept: ORTHOPEDIC SURGERY | Age: 58
End: 2018-12-06
Payer: COMMERCIAL

## 2018-12-06 DIAGNOSIS — S46.911D RIGHT SHOULDER STRAIN, SUBSEQUENT ENCOUNTER: ICD-10-CM

## 2018-12-06 DIAGNOSIS — M19.019 OSTEOARTHRITIS OF AC (ACROMIOCLAVICULAR) JOINT: Primary | ICD-10-CM

## 2018-12-06 DIAGNOSIS — M75.41 ROTATOR CUFF IMPINGEMENT SYNDROME, RIGHT: ICD-10-CM

## 2018-12-06 PROCEDURE — 20611 DRAIN/INJ JOINT/BURSA W/US: CPT | Performed by: INTERNAL MEDICINE

## 2018-12-06 PROCEDURE — 99212 OFFICE O/P EST SF 10 MIN: CPT | Performed by: INTERNAL MEDICINE

## 2019-01-10 ENCOUNTER — OFFICE VISIT (OUTPATIENT)
Dept: ORTHOPEDIC SURGERY | Age: 59
End: 2019-01-10
Payer: COMMERCIAL

## 2019-01-10 VITALS — WEIGHT: 244 LBS | BODY MASS INDEX: 29.71 KG/M2 | HEIGHT: 76 IN

## 2019-01-10 DIAGNOSIS — M19.019 OSTEOARTHRITIS OF AC (ACROMIOCLAVICULAR) JOINT: ICD-10-CM

## 2019-01-10 DIAGNOSIS — M75.41 ROTATOR CUFF IMPINGEMENT SYNDROME, RIGHT: Primary | ICD-10-CM

## 2019-01-10 DIAGNOSIS — S46.911D RIGHT SHOULDER STRAIN, SUBSEQUENT ENCOUNTER: ICD-10-CM

## 2019-01-10 PROCEDURE — 99213 OFFICE O/P EST LOW 20 MIN: CPT | Performed by: INTERNAL MEDICINE

## 2019-01-24 ENCOUNTER — TELEPHONE (OUTPATIENT)
Dept: ORTHOPEDIC SURGERY | Age: 59
End: 2019-01-24

## 2019-01-25 ENCOUNTER — TELEPHONE (OUTPATIENT)
Dept: ORTHOPEDIC SURGERY | Age: 59
End: 2019-01-25

## 2019-02-13 ENCOUNTER — OFFICE VISIT (OUTPATIENT)
Dept: ORTHOPEDIC SURGERY | Age: 59
End: 2019-02-13
Payer: COMMERCIAL

## 2019-02-13 VITALS — HEIGHT: 72 IN | BODY MASS INDEX: 33.05 KG/M2 | WEIGHT: 244 LBS

## 2019-02-13 DIAGNOSIS — M25.562 LEFT KNEE PAIN, UNSPECIFIED CHRONICITY: Primary | ICD-10-CM

## 2019-02-13 PROCEDURE — 99213 OFFICE O/P EST LOW 20 MIN: CPT | Performed by: ORTHOPAEDIC SURGERY

## 2019-02-13 RX ORDER — MELOXICAM 15 MG/1
15 TABLET ORAL DAILY
Qty: 30 TABLET | Refills: 3 | Status: SHIPPED | OUTPATIENT
Start: 2019-02-13

## 2019-02-14 ENCOUNTER — OFFICE VISIT (OUTPATIENT)
Dept: ORTHOPEDIC SURGERY | Age: 59
End: 2019-02-14
Payer: COMMERCIAL

## 2019-02-14 DIAGNOSIS — M19.019 OSTEOARTHRITIS OF AC (ACROMIOCLAVICULAR) JOINT: Primary | ICD-10-CM

## 2019-02-14 DIAGNOSIS — M75.41 ROTATOR CUFF IMPINGEMENT SYNDROME, RIGHT: ICD-10-CM

## 2019-02-14 PROCEDURE — 99213 OFFICE O/P EST LOW 20 MIN: CPT | Performed by: INTERNAL MEDICINE

## 2019-02-26 ENCOUNTER — TELEPHONE (OUTPATIENT)
Dept: ORTHOPEDIC SURGERY | Age: 59
End: 2019-02-26

## 2019-03-28 ENCOUNTER — OFFICE VISIT (OUTPATIENT)
Dept: ORTHOPEDIC SURGERY | Age: 59
End: 2019-03-28
Payer: COMMERCIAL

## 2019-03-28 DIAGNOSIS — M75.41 ROTATOR CUFF IMPINGEMENT SYNDROME, RIGHT: Primary | ICD-10-CM

## 2019-03-28 DIAGNOSIS — S46.911D RIGHT SHOULDER STRAIN, SUBSEQUENT ENCOUNTER: ICD-10-CM

## 2019-03-28 DIAGNOSIS — M19.019 OSTEOARTHRITIS OF AC (ACROMIOCLAVICULAR) JOINT: ICD-10-CM

## 2019-03-28 PROCEDURE — 99213 OFFICE O/P EST LOW 20 MIN: CPT | Performed by: INTERNAL MEDICINE

## 2019-04-02 ENCOUNTER — OFFICE VISIT (OUTPATIENT)
Dept: ORTHOPEDIC SURGERY | Age: 59
End: 2019-04-02
Payer: COMMERCIAL

## 2019-04-02 VITALS — HEIGHT: 72 IN | RESPIRATION RATE: 10 BRPM | WEIGHT: 244.05 LBS | BODY MASS INDEX: 33.06 KG/M2

## 2019-04-02 DIAGNOSIS — S46.911A STRAIN OF RIGHT SHOULDER, INITIAL ENCOUNTER: ICD-10-CM

## 2019-04-02 DIAGNOSIS — M75.41 IMPINGEMENT SYNDROME OF RIGHT SHOULDER: ICD-10-CM

## 2019-04-02 DIAGNOSIS — M25.511 RIGHT SHOULDER PAIN, UNSPECIFIED CHRONICITY: Primary | ICD-10-CM

## 2019-04-02 PROCEDURE — 99242 OFF/OP CONSLTJ NEW/EST SF 20: CPT | Performed by: ORTHOPAEDIC SURGERY

## 2019-04-02 NOTE — PROGRESS NOTES
SHOULDER CONSULTATION    Referring Provider: Dr Darryl Leyva     Primary Care Provider: Dr Ezzie Soulier    Chief Complaint    Shoulder Pain (CK RIGHT SHOULDER - WORK INJURY II/2016)      History of Present Illness:  Yamile Moise is a 66 y.o. male who presents today kindly referred for evaluation of his right shoulder. In December 2016 he suffered a work-related injury lifting. He to cervical disc herniation and apparently quite significant myeloradiculopathy. He underwent decompression and fusion. He reports that he has always had some weakness and postural deficits in his right shoulder since that time. He's been under the excellent care of Dr. Darryl Leyva and had some therapy and injections with short-term relief. He had an MRI in March 2018. He reports that he continues to have some superior shoulder pain particularly with lifting, crossarm movement and extension. He has very little rest pain. He reports his symptoms are tolerable but frustrating. Pain Assessment  Location of Pain: Shoulder  Location Modifiers: Right  Severity of Pain: 3  Quality of Pain: Aching  Duration of Pain: Persistent  Frequency of Pain: Constant  Aggravating Factors: Other (Comment)  Limiting Behavior: Yes  Result of Injury: Yes  Work-Related Injury: Yes  Are there other pain locations you wish to document?: No    Medical History:  Patient's medications, allergies, past medical, surgical, social and family histories were reviewed and updated as appropriate. Review of Systems:  Pertinent items are noted in HPI  Review of systems reviewed from Patient History Form dated on April 2 and available in the patient's chart under the Media tab. Vital Signs:  Resp 10   Ht 6' 0.01\" (1.829 m)   Wt 244 lb 0.8 oz (110.7 kg)   BMI 33.09 kg/m²       General Exam:   Constitutional: Patient is adequately groomed with no evidence of malnutrition  Mental Status: The patient is oriented to time, place and person.   The

## 2019-04-17 ENCOUNTER — HOSPITAL ENCOUNTER (OUTPATIENT)
Dept: PHYSICAL THERAPY | Age: 59
Setting detail: THERAPIES SERIES
Discharge: HOME | End: 2019-04-17
Payer: COMMERCIAL

## 2019-04-17 PROCEDURE — G8978 MOBILITY CURRENT STATUS: HCPCS

## 2019-04-17 PROCEDURE — 97140 MANUAL THERAPY 1/> REGIONS: CPT

## 2019-04-17 PROCEDURE — 97161 PT EVAL LOW COMPLEX 20 MIN: CPT

## 2019-04-17 PROCEDURE — G8979 MOBILITY GOAL STATUS: HCPCS

## 2019-04-17 PROCEDURE — 97110 THERAPEUTIC EXERCISES: CPT

## 2019-04-17 NOTE — FLOWSHEET NOTE
[facility], [address] [place]  Phone: [phone]  Fax [phone]      Physical Therapy Daily Treatment Note  Date:  2019    Patient Name:  Yoanna Friedman    :  1960  MRN: [id number]  Restrictions/Precautions:    Medical/Treatment Diagnosis Information:  · Diagnosis: A70.288H (ICD-10-CM) - Strain of right shoulder, initial encounter; M75.41 (ICD-10-CM) - Impingement syndrome of right shoulder  · Treatment Diagnosis: M25.511 Pain in right shoulder; M25.611 Stiffness in right shoulder  Insurance/Certification information:  PT Insurance Information: [facility]  Physician Information:  Referring Practitioner: Soto Mendez MD  Plan of care signed (Y/N):     Date of Patient follow up with Physician:19     G-Code (if applicable):      Date G-Code Applied: 19   PT G-Codes  Functional Assessment Tool Used: Quick DASh  Score: 25  Functional Limitation: Mobility: Walking and moving around  Mobility: Walking and Moving Around Current Status (): At least 20 percent but less than 40 percent impaired, limited or restricted  Mobility: Walking and Moving Around Goal Status ():  At least 1 percent but less than 20 percent impaired, limited or restricted    Progress Note: [x]  Yes  []  No  Next due by: Visit #10      Latex Allergy:  [x]NO      []YES  Preferred Language for Healthcare:   [x]English       []other:    Visit # Insurance Allowable Requires auth   1 auth 2-3x/week x 6 weeks (POC is 12- 2x/week x 6 weeks)  -    []no        [x]yes:     Pain level:  0-3/10     SUBJECTIVE:  See eval    OBJECTIVE: See eval  Observation:   Test measurements:      RESTRICTIONS/PRECAUTIONS: Hx of cervical fusion (levels unknown), partial R supraspinatus tear, degenerative labral fraying, mod-severe AC arthritis      Exercises/Interventions:   Therapeutic Ex Sets/rep comments   Pt ed: shoulder anatomy; postural education and effect of posture on condition; impingement positions and how to avoid them; POC 10'    Supine pec stretch ~75 deg ABD 30\"x3 HEP   Post capsule stretch- cross arm 15\"x3 HEP, cues for gently stretch   B shoulder retraction at wall 5\"x20 HEP, cues for cervical pos   TT row 2x10 Red TT, blue for HEP                                                                         Manual Intervention     GHJ distractiono/oscillation, post GHJ, inf GHJ mobilizations GIII  Followed by PROM flex, IR 15\"x4 ea    5x ea     Flexion better with GHJ compression   Inf and post SCJ mobilization GIII 15\"x3 ea    scap mobs- all 3'                        NMR re-education                                                 Therapeutic Exercise and NMR EXR  [x] (14948) Provided verbal/tactile cueing for activities related to strengthening, flexibility, endurance, ROM  for improvements in scapular, scapulothoracic and UE control with self care, reaching, carrying, lifting, house/yardwork, driving/computer work.    [] (97017) Provided verbal/tactile cueing for activities related to improving balance, coordination, kinesthetic sense, posture, motor skill, proprioception  to assist with  scapular, scapulothoracic and UE control with self care, reaching, carrying, lifting, house/yardwork, driving/computer work. Therapeutic Activities:    [] (90171 or 12810) Provided verbal/tactile cueing for activities related to improving balance, coordination, kinesthetic sense, posture, motor skill, proprioception and motor activation to allow for proper function of scapular, scapulothoracic and UE control with self care, carrying, lifting, driving/computer work.      Home Exercise Program:    [x] (73796) Reviewed/Progressed HEP activities related to strengthening, flexibility, endurance, ROM of scapular, scapulothoracic and UE control with self care, reaching, carrying, lifting, house/yardwork, driving/computer work  [] (29598) Reviewed/Progressed HEP activities related to improving balance, coordination, kinesthetic sense, posture, motor skill, proprioception of scapular, scapulothoracic and UE control with self care, reaching, carrying, lifting, house/yardwork, driving/computer work      Manual Treatments:  PROM / STM / Oscillations-Mobs:  G-I, II, III, IV (PA's, Inf., Post.)  [x] (78662) Provided manual therapy to mobilize soft tissue/joints of cervical/CT, scapular GHJ and UE for the purpose of modulating pain, promoting relaxation,  increasing ROM, reducing/eliminating soft tissue swelling/inflammation/restriction, improving soft tissue extensibility and allowing for proper ROM for normal function with self care, reaching, carrying, lifting, house/yardwork, driving/computer work    Modalities:  declined  Charges:  Timed Code Treatment Minutes: 30   Total Treatment Minutes: 60     [x] EVAL (LOW) 06682 (typically 20 minutes face-to-face)11:00-11:30  [] EVAL (MOD) 12393 (typically 30 minutes face-to-face)  [] EVAL (HIGH) 15956 (typically 45 minutes face-to-face)  [] RE-EVAL     [x] JQ(73454) x  1  11:45-12:00 [] IONTO  [] NMR (41112) x      [] VASO  [x] Manual (92526) x  1   11:30-11:45 [] Other:  [] TA x       [] Mech Traction (98432)  [] ES(attended) (02523)      [] ES (un) (04481):     [place]  stated goal: No pain when sleeping or reaching     Therapist goals for Patient:   Short Term Goals: To be achieved in: 2 weeks  1. Independent in HEP and progression per patient tolerance, in order to prevent re-injury. 2. Patient will have a decrease in pain to facilitate improvement in movement, function, and ADLs as indicated by Functional Deficits.     Long Term Goals: To be achieved in: 6 weeks  1. Disability index score of 9% or less for the [facility] to assist with reaching prior level of function.    2. Patient will demonstrate increased AROM to 160 deg of shoulder flexion and abduction, functional IR to T10 and functional ER to T3 for all reaching and self-care activities. 3. Patient will demonstrate an increase in Strength to 5/5 for the right shoulder, RC, and elbow; at least 4+/5 for scapular stabilizers in order to complete pushing/pulling and lifting activities as needed for occupation. 4. Patient will return to laying on the right shoulder at night without discomfort as well as reach overhead without pain. 5. Patient will begin an indep gym program or GAP as appropriate. Progression Towards Functional goals:  [] Patient is progressing as expected towards functional goals listed. [] Progression is slowed due to complexities listed. [] Progression has been slowed due to co-morbidities.   [x] Plan just implemented, too soon to assess goals progression  [] Other:     ASSESSMENT:  See eval    Treatment/Activity Tolerance:  [x] Patient tolerated treatment well [] Patient limited by fatique  [] Patient limited by pain  [] Patient limited by other medical complications  [] Other:     Prognosis: [x] Good [] Fair  [] Poor    Patient Requires Follow-up: [x] Yes  [] No    PLAN: See eval  [] Continue per plan of care [] Alter current plan (see comments)  [x] Plan of care initiated [] Hold pending MD visit [] Discharge    Electronically signed by: Heidi Cerda, PT, DPT

## 2019-04-17 NOTE — PLAN OF CARE
[facility], [address], [address] [place] [address]  Phone: [phone]  Fax [phone]     Physical Therapy Certification    Dear Referring Practitioner: Claudio Carroll MD,    We had the pleasure of evaluating the following patient for physical therapy services at [address] [place] [address]. A summary of our findings can be found in the initial assessment below. This includes our plan of care. If you have any questions or concerns regarding these findings, please do not hesitate to contact me at the office phone number checked above. Thank you for the referral.       Physician Signature:_______________________________Date:__________________  By signing above (or electronic signature), therapists plan is approved by physician    Patient: Nadia Stiles   : 1960   MRN: [id number]  Referring Physician: Referring Practitioner: Claudio Carroll MD      Evaluation Date: 2019      Medical Diagnosis Information:  Diagnosis: I64.841W (ICD-10-CM) - Strain of right shoulder, initial encounter; M75.41 (ICD-10-CM) - Impingement syndrome of right shoulder   Treatment Diagnosis: M25.511 Pain in right shoulder; M25.611 Stiffness in right shoulder                                         Insurance information: PT Insurance Information: [facility]    Precautions/ Contra-indications: Hx of cervical fusion (levels unknown), partial R supraspinatus tear, degenerative labral fraying, mod-severe AC arthritis  Latex Allergy:  [x]NO      []YES  Preferred Language for Healthcare:   [x]English       []other:    SUBJECTIVE: Patient stated complaint: The initial injury occurred in the workplace in Dec 2016. He was moving a box at work when he felt a pop right through his shoulder. Initially, it was thought that it has his shoulder, but it turned out actually to be his neck.  Pt had surgery for his neck in Aug 2017 because he had 2 cervical disc herniations. He had a cervical decompression and fusion (unsure which level, possibly C6-7?). He had therapy afterward. He still had some pain in his shoulder after PT- it just never went away fully. His main complaint is pain in his shoulder when he sleeps on it. He also has pain when he reaches overhead. He did have two cortisone injections, but it only lasted so long. He is working part-time. Relevant Medical History:December 2016 he suffered a work-related injury lifting. He to cervical disc herniation and apparently quite significant myeloradiculopathy. He underwent decompression and fusion. Per chart review of OV with Kendra Browne MD on 4/2/19:  Radiology:     Careful review of his MRI from March 2018 demonstrates that he has some partial thickness tearing of the supraspinatus and intrinsic tendinopathy. He has some degenerative tearing of the glenoid labrum and a small ossific body in the axillary recess. Also notable is moderately severe acromioclavicular arthrosis with some edema in the distal clavicle    Functional Disability Index:PT G-Codes  Functional Assessment Tool Used: Quick DASh  Score: 25  Functional Limitation: Mobility: Walking and moving around  Mobility: Walking and Moving Around Current Status (): At least 20 percent but less than 40 percent impaired, limited or restricted  Mobility: Walking and Moving Around Goal Status (): At least 1 percent but less than 20 percent impaired, limited or restricted    Pain Scale: 0-3/10  Easing factors:resting  Provocative factors: laying on shoulder, raising arm above head, reaching, lifting (reports that he has lost a lot of strength). Type: []Constant   [x]Intermittent  []Radiating []Localized []other:     Numbness/Tingling: none currently, but did have some n/t down his arm after his cervical surgery    Occupation/School: 25 hours/week- .  Runs vacuum  (industrial), drives a truck 1 day/week    Living Status/Prior Level of Function: Independent with ADLs and IADLs, able to take care of yard and home    OBJECTIVE:     CERV ROM     Cervical Flexion 50    Cervical Extension 30    Cervical SB 25 25   Cervical rotation 48 62        ROM Left Right   Shoulder Flex 155 140 *   Shoulder Abd 160 143 with pain at [facility] joint *   Shoulder ER T2 T1   Shoulder IR Functional T10 Functional L1 with pain at [facility] joint   * shoulder hiking noted               Strength  Left Right   Shoulder Flex 5 5 *   Shoulder Scap 5 5- *   Shoulder ER 4+ 5   Shoulder IR 5 5   tricep 5 4   bicep 5 5     Reflexes/Sensation:    [x]Dermatomes/Myotomes intact    [x]Reflexes equal and normal bilaterally 1+ UE's and LE's,    [x]Other:1 beat ankle clonus bilaterally, - Kendall's     Joint mobility:    []Normal    [x]Hypo R GHJ post and inf; SC joint post and inf   []Hyper    Palpation: not assessed this visit    Functional Mobility/Transfers: functional IR limited, indep with all transfers but painful bed mobility when rolling onto R shoulder, or pushing up onto the R shoulder    Posture: forward shoulders and head, excessive thoracic kyphosis    Bandages/Dressings/Incisions: n/a    Gait: (include devices/WB status): WNL    Orthopedic Special Tests: pain localized to ACJ with [place]er, Hawkin's, [place], empty can tests                       [x] Patient history, allergies, meds reviewed. Medical chart reviewed. See intake form. Review Of Systems (ROS):  [x]Performed Review of systems (Integumentary, CardioPulmonary, Neurological) by intake and observation. Intake form has been scanned into medical record. Patient has been instructed to contact their primary care physician regarding ROS issues if not already being addressed at this time.       Co-morbidities/Complexities (which will affect course of rehabilitation):   []None           Arthritic conditions   []Rheumatoid arthritis (M05.9)  [x]Osteoarthritis (M19.91)   Cardiovascular conditions   [x]Hypertension functional strength   []Abnormal reflexes/sensation/myotomal/dermatomal deficits   [x]Decreased RC/scapular/core strength and neuromuscular control   []other:      Functional Activity Limitations (from functional questionnaire and intake)   [x]Reduced ability to tolerate prolonged functional positions   [x]Reduced ability or difficulty with changes of positions or transfers between positions   [x]Reduced ability to maintain good posture and demonstrate good body mechanics with sitting, bending, and lifting   [] Reduced ability or tolerance with driving and/or computer work   [x]Reduced ability to sleep   [x]Reduced ability to perform lifting, reaching, carrying tasks   [x]Reduced ability to tolerate impact through UE   [x]Reduced ability to reach behind back   [x]Reduced ability to  or hold objects   [x]Reduced ability to throw or toss an object   []other:    Participation Restrictions   [x]Reduced participation in self care activities   [x]Reduced participation in home management activities   [x]Reduced participation in work activities   []Reduced participation in social activities. [x]Reduced participation in sport/recreation activities. Classification:   []Signs/symptoms consistent with post-surgical status including decreased ROM, strength and function.   []Signs/symptoms consistent with joint sprain/strain   []Signs/symptoms consistent with shoulder impingement   [x]Signs/symptoms consistent with shoulder/elbow/wrist tendinopathy   []Signs/symptoms consistent with Rotator cuff tear   []Signs/symptoms consistent with labral tear   [x]Signs/symptoms consistent with postural dysfunction    []Signs/symptoms consistent with Glenohumeral IR Deficit - <45 degrees   []Signs/symptoms consistent with facet dysfunction of cervical/thoracic spine    []Signs/symptoms consistent with pathology which may benefit from Dry needling     []other:     Prognosis/Rehab Potential: []Excellent   [x]Good    []Fair   []Poor    Tolerance of evaluation/treatment:    []Excellent   [x]Good    []Fair   []Poor  Physical Therapy Evaluation Complexity Justification  [x] A history of present problem with:  [] no personal factors and/or comorbidities that impact the plan of care;  [x]1-2 personal factors and/or comorbidities that impact the plan of care  []3 personal factors and/or comorbidities that impact the plan of care  [x] An examination of body systems using standardized tests and measures addressing any of the following: body structures and functions (impairments), activity limitations, and/or participation restrictions;:  [] a total of 1-2 or more elements   [] a total of 3 or more elements   [x] a total of 4 or more elements   [x] A clinical presentation with:  [x] stable and/or uncomplicated characteristics   [] evolving clinical presentation with changing characteristics  [] unstable and unpredictable characteristics;   [x] Clinical decision making of [x] low, [] moderate, [] high complexity using standardized patient assessment instrument and/or measurable assessment of functional outcome.     [x] EVAL (LOW) 04616 (typically 20 minutes face-to-face)  [] EVAL (MOD) 91220 (typically 30 minutes face-to-face)  [] EVAL (HIGH) 67503 (typically 45 minutes face-to-face)  [] RE-EVAL       PLAN:  Frequency/Duration:  2 days per week for 6 Weeks:  INTERVENTIONS:  [x] Therapeutic exercise including: strength training, ROM, for Upper extremity and core   [x]  NMR activation and proprioception for UE, scap and Core   [x] Manual therapy as indicated for shoulder, scapula and spine to include: Dry Needling/IASTM, STM, PROM, Gr I-IV mobilizations, manipulation- in lower t-spine only d/t cervical fusion   [x] Modalities as needed that may include: thermal agents, E-stim, Biofeedback, US, iontophoresis as indicated  [x] Patient education on joint protection, postural re-education, activity modification, progression of HEP. HEP instruction: (see scanned forms)    GOALS:  Patient stated goal: No pain when sleeping or reaching    Therapist goals for Patient:   Short Term Goals: To be achieved in: 2 weeks  1. Independent in HEP and progression per patient tolerance, in order to prevent re-injury. 2. Patient will have a decrease in pain to facilitate improvement in movement, function, and ADLs as indicated by Functional Deficits. Long Term Goals: To be achieved in: 6 weeks  1. Disability index score of 9% or less for the [facility] to assist with reaching prior level of function. 2. Patient will demonstrate increased AROM to 160 deg of shoulder flexion and abduction, functional IR to T10 and functional ER to T3 for all reaching and self-care activities. 3. Patient will demonstrate an increase in Strength to 5/5 for the right shoulder, RC, and elbow; at least 4+/5 for scapular stabilizers in order to complete pushing/pulling and lifting activities as needed for occupation. 4. Patient will return to laying on the right shoulder at night without discomfort as well as reach overhead without pain. 5. Patient will begin an indep gym program or GAP as appropriate.        Electronically signed by:  Chary Flower, PT, DPT

## 2019-04-19 ENCOUNTER — HOSPITAL ENCOUNTER (OUTPATIENT)
Dept: PHYSICAL THERAPY | Age: 59
Setting detail: THERAPIES SERIES
Discharge: HOME | End: 2019-04-19
Payer: COMMERCIAL

## 2019-04-19 PROCEDURE — 97110 THERAPEUTIC EXERCISES: CPT

## 2019-04-19 PROCEDURE — 97140 MANUAL THERAPY 1/> REGIONS: CPT

## 2019-04-19 NOTE — FLOWSHEET NOTE
feli, mod-severe AC arthritis      Exercises/Interventions:   Therapeutic Ex Sets/rep comments   Pt ed: shoulder anatomy; postural education and effect of posture on condition; impingement positions and how to avoid them; POC Reviewed 3'    Pulleys flexion, abduction 10xea    Supine pec stretch ~75 deg ABD 60\"x3 HEP   Supine cane stretch flexion  Cane ER stretch 5\"x10 ea + HEP   Serratus reach 20x 2# + HEP   S/l ER 3x10 + HEP   Post capsule stretch- cross arm HEP, cues for gently stretch   B shoulder retraction at wall HEP, cues for cervical pos   TT row  Lat pull down 2x15 ea Red TT, blue for HEP   B UE ER - back to wall 3\"x20 orange   Prone SWB sh ext  \" MT 2x15  2x10 No wt                                                               Manual Intervention     GHJ distractiono/oscillation, post GHJ, inf GHJ mobilizations GIII  Followed by PROM IR, ER 15\"x4 ea    5x ea     Flexion better with GHJ compression   Inf and post SCJ mobilization GIII 15\"x3 ea    scap mobs- all  STM prince-scap 3'  2'                        NMR re-education                                                 Therapeutic Exercise and NMR EXR  [x] (87020) Provided verbal/tactile cueing for activities related to strengthening, flexibility, endurance, ROM  for improvements in scapular, scapulothoracic and UE control with self care, reaching, carrying, lifting, house/yardwork, driving/computer work.    [] (10855) Provided verbal/tactile cueing for activities related to improving balance, coordination, kinesthetic sense, posture, motor skill, proprioception  to assist with  scapular, scapulothoracic and UE control with self care, reaching, carrying, lifting, house/yardwork, driving/computer work.     Therapeutic Activities:    [] (68445 or 68529) Provided verbal/tactile cueing for activities related to improving balance, coordination, kinesthetic sense, posture, motor skill, proprioception and motor activation to allow for proper function of in pain to facilitate improvement in movement, function, and ADLs as indicated by Functional Deficits.     Long Term Goals: To be achieved in: 6 weeks  1. Disability index score of 9% or less for the [facility] to assist with reaching prior level of function. 2. Patient will demonstrate increased AROM to 160 deg of shoulder flexion and abduction, functional IR to T10 and functional ER to T3 for all reaching and self-care activities. 3. Patient will demonstrate an increase in Strength to 5/5 for the right shoulder, RC, and elbow; at least 4+/5 for scapular stabilizers in order to complete pushing/pulling and lifting activities as needed for occupation. 4. Patient will return to laying on the right shoulder at night without discomfort as well as reach overhead without pain. 5. Patient will begin an indep gym program or GAP as appropriate. Progression Towards Functional goals:  [] Patient is progressing as expected towards functional goals listed. [] Progression is slowed due to complexities listed. [] Progression has been slowed due to co-morbidities. [x] Plan just implemented, too soon to assess goals progression  [] Other:     ASSESSMENT: Pt did well with all exercise, but was sore from completing cane flexion stretches, thus used a CP at end of session. Asked pt to avoid laying on the right shoulder still at night.      Treatment/Activity Tolerance:  [x] Patient tolerated treatment well [] Patient limited by fatique  [] Patient limited by pain  [] Patient limited by other medical complications  [] Other:     Prognosis: [x] Good [] Fair  [] Poor    Patient Requires Follow-up: [x] Yes  [] No    PLAN: Add weights to ER and prone SWB ex's  [x] Continue per plan of care [] Alter current plan (see comments)  [] Plan of care initiated [] Hold pending MD visit [] Discharge    Electronically signed by: Kristopher Vyas, PT, DPT

## 2019-04-26 ENCOUNTER — HOSPITAL ENCOUNTER (OUTPATIENT)
Dept: PHYSICAL THERAPY | Age: 59
Setting detail: THERAPIES SERIES
Discharge: HOME | End: 2019-04-26
Payer: COMMERCIAL

## 2019-04-26 PROCEDURE — 97110 THERAPEUTIC EXERCISES: CPT

## 2019-04-26 PROCEDURE — 97140 MANUAL THERAPY 1/> REGIONS: CPT

## 2019-04-26 NOTE — FLOWSHEET NOTE
[facility], [address]  Phone: [phone]  Fax [phone]      Physical Therapy Daily Treatment Note  Date:  2019    Patient Name:  Claudia Lion    :  1960  MRN: [id number]  Restrictions/Precautions:    Medical/Treatment Diagnosis Information:  · Diagnosis: J34.821E (ICD-10-CM) - Strain of right shoulder, initial encounter; M75.41 (ICD-10-CM) - Impingement syndrome of right shoulder  · Treatment Diagnosis: M25.511 Pain in right shoulder; M25.611 Stiffness in right shoulder  Insurance/Certification information:  PT Insurance Information: [facility]  Physician Information:  Referring Practitioner: Meghan Rose MD  Plan of care signed (Y/N):     Date of Patient follow up with Physician:19     G-Code (if applicable):      Date G-Code Applied: 19   PT G-Codes  Functional Assessment Tool Used: Quick DASh  Score: 25  Functional Limitation: Mobility: Walking and moving around  Mobility: Walking and Moving Around Current Status (): At least 20 percent but less than 40 percent impaired, limited or restricted  Mobility: Walking and Moving Around Goal Status (): At least 1 percent but less than 20 percent impaired, limited or restricted    Progress Note: [x]  Yes  []  No  Next due by: Visit #10      Latex Allergy:  [x]NO      []YES  Preferred Language for Healthcare:   [x]English       []other:    Visit # Insurance Allowable Requires auth   3 auth 2-3x/week x 6 weeks (POC is 12- 2x/week x 6 weeks)  -    []no        [x]yes:     Pain level:  0-2/10     SUBJECTIVE:  Pt reports that his shoulder has been feeling better since his IE. Pain level is a bit lower. Was able to sleep on his shoulder last night actually. OBJECTIVE:   Observation:   Test measurements:  Flexion AROM 160, abduction 143 with pain, functional IR L1 with stretching in FA, but not painful in ACJ. RESTRICTIONS/PRECAUTIONS: Hx of cervical fusion (levels unknown), partial R supraspinatus tear, degenerative labral fraying, mod-severe AC arthritis      Exercises/Interventions:   Therapeutic Ex Sets/rep comments   Pt ed: shoulder anatomy; postural education and effect of posture on condition; impingement positions and how to avoid them; POC Reviewed 3'    Pulleys flexion, abduction 10xea    Supine pec stretch ~75 deg ABD  pec stretch in samm stretch 60\"x3  30\"x3 HEP on 1/2 FR  End of session   Supine cane stretch flexion  Cane ER stretch  + HEP   Serratus reach 20x 2# + HEP   S/l ER 3x10 1# + HEP   Post capsule stretch- cross arm HEP, cues for gently stretch   B shoulder retraction at wall HEP, cues for cervical pos   TT row  Lat pull down  horiz abduction      2x10  Red TT, blue for HEP    Max cues for form   B UE ER - back to wall 3\"x20 peach   Prone sh ext  \" row  \" MT 2x15  2x15  2x10 No wt                                                               Manual Intervention     GHJ distractiono/oscillation, post GHJ, inf GHJ mobilizations GIII  Followed by PROM IR, ER 15\"x4 ea    5x ea     Flexion and ER improved without pain    Inf and post SCJ mobilization GIII 15\"x3 ea    scap mobs- all  STM prince-scap 3'  2'                        NMR re-education                                                 Therapeutic Exercise and NMR EXR  [x] (83177) Provided verbal/tactile cueing for activities related to strengthening, flexibility, endurance, ROM  for improvements in scapular, scapulothoracic and UE control with self care, reaching, carrying, lifting, house/yardwork, driving/computer work.    [] (01839) Provided verbal/tactile cueing for activities related to improving balance, coordination, kinesthetic sense, posture, motor skill, proprioception  to assist with  scapular, scapulothoracic and UE control with self care, reaching, carrying, lifting, house/yardwork, driving/computer work.     Therapeutic Activities: reaching     Therapist goals for Patient:   Short Term Goals: To be achieved in: 2 weeks  1. Independent in HEP and progression per patient tolerance, in order to prevent re-injury. 2. Patient will have a decrease in pain to facilitate improvement in movement, function, and ADLs as indicated by Functional Deficits.     Long Term Goals: To be achieved in: 6 weeks  1. Disability index score of 9% or less for the [facility] to assist with reaching prior level of function. 2. Patient will demonstrate increased AROM to 160 deg of shoulder flexion and abduction, functional IR to T10 and functional ER to T3 for all reaching and self-care activities. 3. Patient will demonstrate an increase in Strength to 5/5 for the right shoulder, RC, and elbow; at least 4+/5 for scapular stabilizers in order to complete pushing/pulling and lifting activities as needed for occupation. 4. Patient will return to laying on the right shoulder at night without discomfort as well as reach overhead without pain. 5. Patient will begin an indep gym program or GAP as appropriate. Progression Towards Functional goals:  [] Patient is progressing as expected towards functional goals listed. [] Progression is slowed due to complexities listed. [] Progression has been slowed due to co-morbidities. [x] Plan just implemented, too soon to assess goals progression  [] Other:     ASSESSMENT: Pt has improved shoulder flexion and ER ROM since beginning PT, but is still painful with abduction. Max cues needed for scapular positioning with prone scapular stabilization and standing horizontal abduction.      Treatment/Activity Tolerance:  [x] Patient tolerated treatment well [] Patient limited by fatique  [] Patient limited by pain  [] Patient limited by other medical complications  [] Other:     Prognosis: [x] Good [] Fair  [] Poor    Patient Requires Follow-up: [x] Yes  [] No    PLAN: Add weights  prone scap ext and row  [x] Continue per plan

## 2019-04-29 ENCOUNTER — HOSPITAL ENCOUNTER (OUTPATIENT)
Dept: PHYSICAL THERAPY | Age: 59
Setting detail: THERAPIES SERIES
Discharge: HOME | End: 2019-04-29
Payer: COMMERCIAL

## 2019-04-29 PROCEDURE — 97140 MANUAL THERAPY 1/> REGIONS: CPT

## 2019-04-29 PROCEDURE — 97110 THERAPEUTIC EXERCISES: CPT

## 2019-04-29 NOTE — FLOWSHEET NOTE
total [] IONTO  [] NMR (71715) x      [] VASO  [x] Manual (74976) x  1   5:00-5:15 [] Other:  [] TA x       [] Mech Traction (92791)  [] ES(attended) (45806)      [] ES (un) (78087):     Ana Flatness stated goal: No pain when sleeping or reaching     Therapist goals for Patient:   Short Term Goals: To be achieved in: 2 weeks  1. Independent in HEP and progression per patient tolerance, in order to prevent re-injury. 2. Patient will have a decrease in pain to facilitate improvement in movement, function, and ADLs as indicated by Functional Deficits.     Long Term Goals: To be achieved in: 6 weeks  1. Disability index score of 9% or less for the [facility] to assist with reaching prior level of function. 2. Patient will demonstrate increased AROM to 160 deg of shoulder flexion and abduction, functional IR to T10 and functional ER to T3 for all reaching and self-care activities. 3. Patient will demonstrate an increase in Strength to 5/5 for the right shoulder, RC, and elbow; at least 4+/5 for scapular stabilizers in order to complete pushing/pulling and lifting activities as needed for occupation. 4. Patient will return to laying on the right shoulder at night without discomfort as well as reach overhead without pain. 5. Patient will begin an indep gym program or GAP as appropriate. Progression Towards Functional goals:  [] Patient is progressing as expected towards functional goals listed. [] Progression is slowed due to complexities listed. [] Progression has been slowed due to co-morbidities. [x] Plan just implemented, too soon to assess goals progression  [] Other:     ASSESSMENT: Pt has improved shoulder flexion, abduction and ER ROM since beginning PT, but is still painful with abduction. He is responding well to initial therapy, but will benefit from continued treatment focusing on joint mobilizations and mid-back, RC strengthening.      Treatment/Activity Tolerance:  [x] Patient tolerated treatment well [] Patient limited by fatique  [] Patient limited by pain  [] Patient limited by other medical complications  [] Other:     Prognosis: [x] Good [] Fair  [] Poor    Patient Requires Follow-up: [x] Yes  [] No    PLAN: Continue with scapular positioning and strengthening  [x] Continue per plan of care [] Alter current plan (see comments)  [] Plan of care initiated [] Hold pending MD visit [] Discharge    Electronically signed by: Evens Vazquez PT, DPT

## 2019-04-30 ENCOUNTER — OFFICE VISIT (OUTPATIENT)
Dept: ORTHOPEDIC SURGERY | Age: 59
End: 2019-04-30
Payer: COMMERCIAL

## 2019-04-30 VITALS — WEIGHT: 250 LBS | HEIGHT: 73 IN | BODY MASS INDEX: 33.13 KG/M2

## 2019-04-30 DIAGNOSIS — S46.001D INJURY OF RIGHT ROTATOR CUFF, SUBSEQUENT ENCOUNTER: Primary | ICD-10-CM

## 2019-04-30 PROCEDURE — 99213 OFFICE O/P EST LOW 20 MIN: CPT | Performed by: ORTHOPAEDIC SURGERY

## 2019-04-30 NOTE — OP NOTE
[facility], [address] [place]  Phone: [phone]  Fax [phone]    · Date: 4/29/2019  Physician: Dr. Jessica Connell Patient: Bishop Morales Diagnosis:Diagnosis: I86.285K (ICD-10-CM) - Strain of right shoulder, initial encounter; M75.41 (ICD-10-CM) - Impingement syndrome of right shoulder  · Treatment Diagnosis: M25.511 Pain in right shoulder; M25.611 Stiffness in right shoulder    Patient has received 4 sessions of Physical Therapy over a 1.5week period. Functional Questionnaire Score: Initial 25%, Current 25%  Pain reported has decreased from 0-3/10 to 0-2/10    ROM Initial (R) Initial (L) Current (R) Current (L)   Shoulder flexion 140 with pain 155 160    Shoulder abduction 143 with pain 160 160 with pain    Functional IR L1 with pain T10 L1 without pain    ER at 90 deg ABD              Strength       Shoulder flexion 5 with pain 5 5 without pain     scaption 5- with pain   5- with pain    Elbow extension 4 5 4+    ER 5 4+ 4+             Functional Activity Checklist: The patient continues to have moderate difficulty with the following:   [] Personal care  [x] Reaching / overhead  [] Standing    [x] Housework chores  [] Climbing  [] Driving / riding in a vehicle    [x] Work  [] Squatting  [] Bed / vehicle mobility    [x] Lifting  [] Walking  [] Sleeping    [x] Pushing / pulling  [] Sitting  [] Concentrating / reading     Specific Functional Improvement and Impression:  Pt has improved shoulder flexion, abduction and ER ROM since beginning PT, but is still painful with abduction.  He is responding well to initial therapy, but will benefit from continued treatment focusing on joint mobilizations and mid-back, RC strengthening.      Summary:   [x] Patient is progressing as expected for this condition   [] Patient is progressing, but slower than expected for this condition   [] Patient is not progressing  Clinician

## 2019-04-30 NOTE — PROGRESS NOTES
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 He's had some improvement with his scapular positioning and stretching. He's not pleased at his strength inhibition. Still has some trouble at night laying on his side. Objective:     @IPVITALS(24)@    Review of Systems  Pertinent items are noted in HPI  Denies fever, chills, confusion, bowel/bladder active change. Review of systems reviewed from Patient History Form dated on April 30, 2019 and available in the patient's chart under the Media tab. Exam: Unchanged      Imaging:  Reviewed    Office Procedures:      Assessment:     As above. Moderate improvement    Plan:      1:  We had good discussion today reviewing his anatomy and pathology. I have recommended we continue the conservative course for now. He will use both local and oral anti-inflammatories. Participate in his home exercise program twice a day. He'll be careful about exacerbating activities. We'll see him back in a month for follow-up. If we determine failure of conservative treatment arthroscopic intervention could be considered as salvage.          Follow-Up Visit:  One month            [address] [facility] [place] [facility] and [facility] Medicine Surgery

## 2019-05-01 ENCOUNTER — HOSPITAL ENCOUNTER (OUTPATIENT)
Dept: PHYSICAL THERAPY | Age: 59
Setting detail: THERAPIES SERIES
Discharge: HOME | End: 2019-05-01
Payer: COMMERCIAL

## 2019-05-01 PROCEDURE — 97110 THERAPEUTIC EXERCISES: CPT

## 2019-05-01 PROCEDURE — 97140 MANUAL THERAPY 1/> REGIONS: CPT

## 2019-05-01 NOTE — FLOWSHEET NOTE
RESTRICTIONS/PRECAUTIONS: Hx of cervical fusion (levels unknown), partial R supraspinatus tear, degenerative labral fraying, mod-severe AC arthritis      Exercises/Interventions:   Therapeutic Ex Sets/rep comments HEP   Pt ed: shoulder anatomy; postural education and effect of posture on condition; impingement positions and how to avoid them; POC Reviewed 3'     Pulleys flexion, abduction 10xea     Supine pec stretch ~75 deg ABD  pec stretch in samm stretch   on 1/2 FR  End of session X   Supine cane stretch flexion  Cane ER stretch   X   Serratus reach 20x 2#  X   S/l ER 3x10 2#  X   Post capsule stretch- cross arm  cues for gently stretch X   B shoulder retraction at wall  cues for cervical pos X   TT row  Lat pull down  horiz abduction      2x10  Red TT, blue for     Yellow Max cues for form X   B UE ER - back to wall 3\"x20 peach    Prone sh ext R,L ea  \" row  \" MT 2x15  2x15  2x10 Over SWB 2#  2#                                                                            Manual Intervention      GHJ distractiono/oscillation, post GHJ, inf GHJ mobilizations GIII  Followed by PROM IR, ER 15\"x4 ea    5x ea     Flexion and ER improved without pain     Inf and post SCJ mobilization GIII 15\"x3 ea     scap mobs- all  STM prince-scap     MWM abduction 2x10 Post clavicular glide                      NMR re-education                                                          Therapeutic Exercise and NMR EXR  [x] (76406) Provided verbal/tactile cueing for activities related to strengthening, flexibility, endurance, ROM  for improvements in scapular, scapulothoracic and UE control with self care, reaching, carrying, lifting, house/yardwork, driving/computer work.    [] (25804) Provided verbal/tactile cueing for activities related to improving balance, coordination, kinesthetic sense, posture, motor skill, proprioception  to assist with  scapular, scapulothoracic and UE control with self care, reaching, carrying, lifting, house/yardwork, driving/computer work. Therapeutic Activities:    [] (32294 or 76267) Provided verbal/tactile cueing for activities related to improving balance, coordination, kinesthetic sense, posture, motor skill, proprioception and motor activation to allow for proper function of scapular, scapulothoracic and UE control with self care, carrying, lifting, driving/computer work.      Home Exercise Program:    [x] (29057) Reviewed/Progressed HEP activities related to strengthening, flexibility, endurance, ROM of scapular, scapulothoracic and UE control with self care, reaching, carrying, lifting, house/yardwork, driving/computer work  [] (09958) Reviewed/Progressed HEP activities related to improving balance, coordination, kinesthetic sense, posture, motor skill, proprioception of scapular, scapulothoracic and UE control with self care, reaching, carrying, lifting, house/yardwork, driving/computer work      Manual Treatments:  PROM / STM / Oscillations-Mobs:  G-I, II, III, IV (PA's, Inf., Post.)  [x] (82311) Provided manual therapy to mobilize soft tissue/joints of cervical/CT, scapular GHJ and UE for the purpose of modulating pain, promoting relaxation,  increasing ROM, reducing/eliminating soft tissue swelling/inflammation/restriction, improving soft tissue extensibility and allowing for proper ROM for normal function with self care, reaching, carrying, lifting, house/yardwork, driving/computer work    Modalities: declined  Charges:  Timed Code Treatment Minutes: 63   Total Treatment Minutes: 77( indep ex, rest breaks)     [] EVAL (LOW) 19044 (typically 20 minutes face-to-face)  [] EVAL (MOD) 49588 (typically 30 minutes face-to-face)  [] EVAL (HIGH) 79728 (typically 45 minutes face-to-face)  [] RE-EVAL     [x] VK(77860) x  3  12:20-12:55 with 10' rest breaks total, then 1:1 with AT 12:59-1:22 [] IONTO  [] NMR (71850) x      [] VASO  [x] Manual (20030) x  1   12:05-12:20 [] Other:  [] TA x       [] Mech Traction (82821)  [] ES(attended) (33351)      [] ES (un) (43982):     Valeria Leyva stated goal: No pain when sleeping or reaching     Therapist goals for Patient:   Short Term Goals: To be achieved in: 2 weeks  1. Independent in HEP and progression per patient tolerance, in order to prevent re-injury. 2. Patient will have a decrease in pain to facilitate improvement in movement, function, and ADLs as indicated by Functional Deficits.     Long Term Goals: To be achieved in: 6 weeks  1. Disability index score of 9% or less for the [facility] to assist with reaching prior level of function. 2. Patient will demonstrate increased AROM to 160 deg of shoulder flexion and abduction, functional IR to T10 and functional ER to T3 for all reaching and self-care activities. 3. Patient will demonstrate an increase in Strength to 5/5 for the right shoulder, RC, and elbow; at least 4+/5 for scapular stabilizers in order to complete pushing/pulling and lifting activities as needed for occupation. 4. Patient will return to laying on the right shoulder at night without discomfort as well as reach overhead without pain. 5. Patient will begin an indep gym program or GAP as appropriate. Progression Towards Functional goals:  [] Patient is progressing as expected towards functional goals listed. [] Progression is slowed due to complexities listed. [] Progression has been slowed due to co-morbidities. [x] Plan just implemented, too soon to assess goals progression  [] Other:     ASSESSMENT: Pt has improved shoulder flexion, abduction and ER ROM since beginning PT, but is still painful with abduction. He is responding well to initial therapy, but will benefit from continued treatment focusing on joint mobilizations and mid-back, RC strengthening.      Treatment/Activity Tolerance:  [x] Patient tolerated treatment well [] Patient limited by fatique  [] Patient limited by pain  [] Patient limited by other medical complications  [] Other:     Prognosis: [x] Good [] Fair  [] Poor    Patient Requires Follow-up: [x] Yes  [] No    PLAN: Continue with scapular positioning and strengthening  [x] Continue per plan of care [] Alter current plan (see comments)  [] Plan of care initiated [] Hold pending MD visit [] Discharge    Electronically signed by: Jasmin Blue, PT, DPT

## 2019-05-01 NOTE — FLOWSHEET NOTE
[facility],  [address] [place]  Phone: [phone]  Fax [phone]    ATHLETIC TRAINING [address]  Date:  2019    Patient Name:  Michael Mobley    :  1960  MRN: [id number]  Restrictions/Precautions:    Medical/Treatment Diagnosis Information:  ·  R shld strain, impingement  ·  R shld pain, stiffness  Physician Information:   Dr. 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 Difficulty w/scap depression, heavy cueing. IH workout made       [place]      Chop                         Chest                         ER Flip     OVL R/L Pullbacks 10x   Long/Short lever  Flex. Scap.                                 ABd.         punch 4\" OTB 10x3\" heavy cues       Body/Cardio Blade Flex/Ext                                    IR/ER                                    ABd/ADd        Push-up      Plus                           Wall                         Table                        Floor        Ball on Wall                Tramp        Theraband    Rows/Ext                          IR                          ER                          No money                          Horiz.  ABd                          Biceps                          Triceps                          Punches        Cable Column   Rows 25# (B) 2x10                              Ext. 20# (B) 2x10                              Lat. Pulldown 35# (B) 2x10                              Biceps                               Triceps 20# (B) 2x10       Modality declined   Initials JLW   Time spent one on one (workers comp) 12:59-1:22   Time spent with PT assistant

## 2019-05-02 ENCOUNTER — OFFICE VISIT (OUTPATIENT)
Dept: ORTHOPEDIC SURGERY | Age: 59
End: 2019-05-02
Payer: COMMERCIAL

## 2019-05-02 VITALS — BODY MASS INDEX: 33.13 KG/M2 | HEIGHT: 73 IN | WEIGHT: 250 LBS

## 2019-05-02 DIAGNOSIS — S46.911D RIGHT SHOULDER STRAIN, SUBSEQUENT ENCOUNTER: ICD-10-CM

## 2019-05-02 DIAGNOSIS — M19.019 OSTEOARTHRITIS OF AC (ACROMIOCLAVICULAR) JOINT: Primary | ICD-10-CM

## 2019-05-02 DIAGNOSIS — M75.41 ROTATOR CUFF IMPINGEMENT SYNDROME, RIGHT: ICD-10-CM

## 2019-05-02 PROCEDURE — 99213 OFFICE O/P EST LOW 20 MIN: CPT | Performed by: INTERNAL MEDICINE

## 2019-05-03 NOTE — PROGRESS NOTES
Chief Complaint:   Chief Complaint   Patient presents with    Follow-up     F/U right shoulder          History of Present Illness:       Patient is a 66 y.o. male returns follow up for the above complaint. The patient was last seen approximately 6 weeksago. The symptoms are stable and show no appreciable change since the last visit. The patient has had no further testing for the problem. In the interim his been evaluated by shoulder specialty surgery-Dr. Theresia Olszewski and scapular stabilization program was recommended when she has started in physical therapy.   He continues to localize discomfort about the superior aspect of the shoulder and a nocturnal component of pain which is positional    No new injuries no new events he denies any neuritic character symptoms or pattern of pain with head and neck range of motion    There is a plan to proceed with distal clavicle resection/subacromial decompression (salvage procedure) if he fails conservative management as outlined above    Treatment to date has included ultrasound guided injection of the acromioclavicular joint and subacromial space     Past Medical History:        Past Medical History:   Diagnosis Date    Acute cholecystitis     Arthritis     Hypertension     Kidney stones     Reflux     Umbilical hernia     Wears glasses     for reading        Present Medications:         Current Outpatient Medications   Medication Sig Dispense Refill    meloxicam (MOBIC) 15 MG tablet Take 1 tablet by mouth daily 30 tablet 3    ketorolac (TORADOL) 10 MG tablet Take 1 tablet by mouth every 6 hours as needed for Pain 15 tablet 0    Amoxicillin 500 MG TABS Take 4 tablets one hour prior to dental procedure 4 tablet 3    meloxicam (MOBIC) 15 MG tablet Take 1 tablet by mouth daily 30 tablet 5    meloxicam (MOBIC) 15 MG tablet Take 1 tablet by mouth daily 30 tablet 5    tamsulosin (FLOMAX) 0.4 MG capsule Take 1 capsule by mouth daily 30 capsule 3    amLODIPine (NORVASC) 5 MG tablet Take 5 mg by mouth daily        No current facility-administered medications for this visit. Allergies:      No Known Allergies        Review of Systems:    Pertinent items are noted in HPI       Vital Signs: There were no vitals filed for this visit. General Exam:     Constitutional: Patient is adequately groomed with no evidence of malnutrition    Physical Exam: right shoulder      Primary Exam:    Inspection:  No deformity atrophy or appreciable effusion      Palpation:  Focal tenderness over the before meals joint      Range of Motion:  Full range symmetric low-grade discomfort end range for elevation and horizontal adduction      Strength:  Normal without pain      Special Tests:  Impingement sign equivocal, acromioclavicular provocative positive      Skin: There are no rashes, ulcerations or lesions. Gait: Nonantalgic     Neurovascular - non focal and intact       Additional Comments:        Additional Examinations:                    Office Imaging Results/Procedures PerformedToday:             Office Procedures:   No orders of the defined types were placed in this encounter. Other Outside Imaging and Testing Personally Reviewed:    No results found. Assessment   Impression: . Encounter Diagnoses   Name Primary?  Osteoarthritis of AC (acromioclavicular) joint Yes    Rotator cuff impingement syndrome, right     Right shoulder strain, subsequent encounter               Plan:       Complete the course of physical therapy as prescribed and follow up with Dr. Jaycee Magallanes for further treatment which may include surgical intervention  Medical pain management as per previous and follow-up sometime after his visit with Dr. Jaycee Magallanes  Resume/continue previous work restrictions       Orders:      No orders of the defined types were placed in this encounter. Vonda Chi MD.      Disclaimer:     \"This note was dictated with voice recognition software. Though review and correction are routine, we apologize for any errors. \"

## 2019-05-08 ENCOUNTER — HOSPITAL ENCOUNTER (OUTPATIENT)
Dept: PHYSICAL THERAPY | Age: 59
Setting detail: THERAPIES SERIES
Discharge: HOME | End: 2019-05-08
Payer: COMMERCIAL

## 2019-05-08 PROCEDURE — 97110 THERAPEUTIC EXERCISES: CPT

## 2019-05-08 PROCEDURE — 97140 MANUAL THERAPY 1/> REGIONS: CPT

## 2019-05-08 NOTE — FLOWSHEET NOTE
[facility], [address]  Phone: [phone]  Fax [phone]      Physical Therapy Daily Treatment Note  Date:  2019    Patient Name:  Donal Sunshine    :  1960  MRN: [id number]  Restrictions/Precautions:    Medical/Treatment Diagnosis Information:  · Diagnosis: N70.775L (ICD-10-CM) - Strain of right shoulder, initial encounter; M75.41 (ICD-10-CM) - Impingement syndrome of right shoulder  · Treatment Diagnosis: M25.511 Pain in right shoulder; M25.611 Stiffness in right shoulder  Insurance/Certification information:  PT Insurance Information: [facility]  Physician Information:  Referring Practitioner: Dolores Graf MD  Plan of care signed (Y/N):     Date of Patient follow up with Physician:19     G-Code (if applicable):      Date G-Code Applied: 19   PT G-Codes  Functional Assessment Tool Used: Quick DASH  Score: 25  Functional Limitation: Mobility: Walking and moving around  Mobility: Walking and Moving Around Current Status (): At least 20 percent but less than 40 percent impaired, limited or restricted  Mobility: Walking and Moving Around Goal Status (): At least 1 percent but less than 20 percent impaired, limited or restricted    Progress Note: [x]  Yes  []  No  Next due by: Visit #10      Latex Allergy:  [x]NO      []YES  Preferred Language for Healthcare:   [x]English       []other:    Visit # Insurance Allowable Requires auth   6 auth 2-3x/week x 6 weeks (POC is 12- 2x/week x 6 weeks)  -    []no        [x]yes:     Pain level:  0-2/10     SUBJECTIVE:  Pt reports that he saw Dr. Christopher Mcqueen and that PT will not help his problem. OBJECTIVE:   Observation:   Test measurements:  Flexion AROM 160, abduction 160 with pain at end range, functional IR L1 with stretching in FA, but not painful in ACJ.      RESTRICTIONS/PRECAUTIONS: Hx of cervical fusion (levels unknown), partial R supraspinatus tear, degenerative labral fraying, mod-severe AC arthritis      Exercises/Interventions:   Therapeutic Ex Sets/rep comments HEP   Pt ed: shoulder anatomy; postural education and effect of posture on condition; impingement positions and how to avoid them; POC Reviewed 3'     Pulleys flexion, abduction 10xea     Supine pec stretch ~75 deg ABD  pec stretch in samm stretch   on 1/2 FR  End of session X   Supine cane stretch flexion  Cane ER stretch   X   Serratus reach 2#  X   S/l ER 2#  X   Post capsule stretch- cross arm  cues for gently stretch X   B shoulder retraction at wall  cues for cervical pos X   TT row  Lat pull down  horiz abduction   TT ER, IR isometric walkouts R,L     2x10  x10 ea  Red TT, blue for     Yellow   Yellow, red X   B UE ER - back to wall 0 peach    Prone sh ext R,L ea  \" row  \" MT  Time Over SWB 2#  2#    Wall push up plus 2x20                                                                       Manual Intervention      GHJ distractiono/oscillation, post GHJ, inf GHJ mobilizations GIII  Followed by PROM IR, ER 15\"x4 ea    5x ea     Flexion and ER improved without pain     Inf and post SCJ mobilization GIII 15\"x3 ea     scap mobs- all  STM prince-scap     MWM flexion 2x10 Post clavicular glide    DTM UT and lev scap, post RC, XFM bicep LH, SH, and pecs 15'                 NMR re-education                                                          Therapeutic Exercise and NMR EXR  [x] (30999) Provided verbal/tactile cueing for activities related to strengthening, flexibility, endurance, ROM  for improvements in scapular, scapulothoracic and UE control with self care, reaching, carrying, lifting, house/yardwork, driving/computer work.    [] (61994) Provided verbal/tactile cueing for activities related to improving balance, coordination, kinesthetic sense, posture, motor skill, proprioception  to assist with  scapular, scapulothoracic and UE control with self care, reaching, carrying, lifting, house/yardwork, driving/computer work. Therapeutic Activities:    [] (44884 or 90829) Provided verbal/tactile cueing for activities related to improving balance, coordination, kinesthetic sense, posture, motor skill, proprioception and motor activation to allow for proper function of scapular, scapulothoracic and UE control with self care, carrying, lifting, driving/computer work.      Home Exercise Program:    [x] (93020) Reviewed/Progressed HEP activities related to strengthening, flexibility, endurance, ROM of scapular, scapulothoracic and UE control with self care, reaching, carrying, lifting, house/yardwork, driving/computer work  [] (58001) Reviewed/Progressed HEP activities related to improving balance, coordination, kinesthetic sense, posture, motor skill, proprioception of scapular, scapulothoracic and UE control with self care, reaching, carrying, lifting, house/yardwork, driving/computer work      Manual Treatments:  PROM / STM / Oscillations-Mobs:  G-I, II, III, IV (PA's, Inf., Post.)  [x] (67481) Provided manual therapy to mobilize soft tissue/joints of cervical/CT, scapular GHJ and UE for the purpose of modulating pain, promoting relaxation,  increasing ROM, reducing/eliminating soft tissue swelling/inflammation/restriction, improving soft tissue extensibility and allowing for proper ROM for normal function with self care, reaching, carrying, lifting, house/yardwork, driving/computer work    Modalities:CP x10' R shoulder 4:20-4:30  Charges:  Timed Code Treatment Minutes: 45   Total Treatment Minutes: 55( CP)     [] EVAL (LOW) 64937 (typically 20 minutes face-to-face)  [] EVAL (MOD) 84038 (typically 30 minutes face-to-face)  [] EVAL (HIGH) 32452 (typically 45 minutes face-to-face)  [] RE-EVAL     [x] GB(54417) x  1  3:58-4:20  [] IONTO  [] NMR (57052) x      [] VASO  [x] Manual (65654) x  2   3:35-3:58 [] Other:  [] TA x       [] Mech Traction (47797)  [] ES(attended) (69551)      [] ES (un) Treatment/Activity Tolerance:  [x] Patient tolerated treatment well [] Patient limited by fatique  [] Patient limited by pain  [] Patient limited by other medical complications  [] Other:     Prognosis: [x] Good [] Fair  [] Poor    Patient Requires Follow-up: [x] Yes  [] No    PLAN: Continue with scapular positioning and strengthening  [x] Continue per plan of care [] Alter current plan (see comments)  [] Plan of care initiated [] Hold pending MD visit [] Discharge    Electronically signed by: Ezequiel Hargrove, PT, DPT

## 2019-05-10 ENCOUNTER — HOSPITAL ENCOUNTER (OUTPATIENT)
Dept: PHYSICAL THERAPY | Age: 59
Setting detail: THERAPIES SERIES
Discharge: HOME | End: 2019-05-10
Payer: COMMERCIAL

## 2019-05-10 PROCEDURE — 97110 THERAPEUTIC EXERCISES: CPT

## 2019-05-10 PROCEDURE — 97140 MANUAL THERAPY 1/> REGIONS: CPT

## 2019-05-10 NOTE — FLOWSHEET NOTE
[facility], [address]  Phone: [phone]  Fax [phone]      Physical Therapy Daily Treatment Note  Date:  5/10/2019    Patient Name:  Jone Fraire    :  1960  MRN: [id number]  Restrictions/Precautions:    Medical/Treatment Diagnosis Information:  · Diagnosis: L19.803U (ICD-10-CM) - Strain of right shoulder, initial encounter; M75.41 (ICD-10-CM) - Impingement syndrome of right shoulder  · Treatment Diagnosis: M25.511 Pain in right shoulder; M25.611 Stiffness in right shoulder  Insurance/Certification information:  PT Insurance Information: [facility]  Physician Information:  Referring Practitioner: Kyle Tam MD  Plan of care signed (Y/N):     Date of Patient follow up with Physician:19     G-Code (if applicable):      Date G-Code Applied: 19   PT G-Codes  Functional Assessment Tool Used: Quick DASH  Score: 25  Functional Limitation: Mobility: Walking and moving around  Mobility: Walking and Moving Around Current Status (): At least 20 percent but less than 40 percent impaired, limited or restricted  Mobility: Walking and Moving Around Goal Status (): At least 1 percent but less than 20 percent impaired, limited or restricted    Progress Note: [x]  Yes  []  No  Next due by: Visit #10      Latex Allergy:  [x]NO      []YES  Preferred Language for Healthcare:   [x]English       []other:    Visit # Insurance Allowable Requires auth   7 auth 2-3x/week x 6 weeks (POC is 12- 2x/week x 6 weeks)  -    []no        [x]yes:     Pain level:  0-2/10     SUBJECTIVE:  Pt reports that his arm isn't bothering him much today, but still when he works overhead. OBJECTIVE:   Observation:   Test measurements:  Flexion AROM 160, abduction 160 with pain at end range, functional IR L1 with stretching in FA, but not painful in ACJ.      RESTRICTIONS/PRECAUTIONS: Hx of cervical fusion (levels Prognosis: [x] Good [] Fair  [] Poor    Patient Requires Follow-up: [x] Yes  [] No    PLAN: Continue with scapular positioning and strengthening  [x] Continue per plan of care [] Alter current plan (see comments)  [] Plan of care initiated [] Hold pending MD visit [] Discharge    Electronically signed by: Evens Vazquez PT, DPT

## 2019-05-13 ENCOUNTER — HOSPITAL ENCOUNTER (OUTPATIENT)
Dept: PHYSICAL THERAPY | Age: 59
Setting detail: THERAPIES SERIES
Discharge: HOME | End: 2019-05-13
Payer: COMMERCIAL

## 2019-05-13 PROCEDURE — 97140 MANUAL THERAPY 1/> REGIONS: CPT

## 2019-05-13 PROCEDURE — 97110 THERAPEUTIC EXERCISES: CPT

## 2019-05-13 NOTE — FLOWSHEET NOTE
[facility], [address] [place] [address]  Phone: [phone]  Fax [phone]      Physical Therapy Daily Treatment Note  Date:  2019    Patient Name:  Reynold Cole    :  1960  MRN: [id number]  Restrictions/Precautions:    Medical/Treatment Diagnosis Information:  · Diagnosis: M54.541R (ICD-10-CM) - Strain of right shoulder, initial encounter; M75.41 (ICD-10-CM) - Impingement syndrome of right shoulder  · Treatment Diagnosis: M25.511 Pain in right shoulder; M25.611 Stiffness in right shoulder  Insurance/Certification information:  PT Insurance Information: [facility]  Physician Information:  Referring Practitioner: Neeta Almaguer MD  Plan of care signed (Y/N):     Date of Patient follow up with Physician:19     G-Code (if applicable):      Date G-Code Applied: 19   PT G-Codes  Functional Assessment Tool Used: Quick DASH  Score: 25  Functional Limitation: Mobility: Walking and moving around  Mobility: Walking and Moving Around Current Status (): At least 20 percent but less than 40 percent impaired, limited or restricted  Mobility: Walking and Moving Around Goal Status (): At least 1 percent but less than 20 percent impaired, limited or restricted    Progress Note: [x]  Yes  []  No  Next due by: Visit #10      Latex Allergy:  [x]NO      []YES  Preferred Language for Healthcare:   [x]English       []other:    Visit # Insurance Allowable Requires auth   8 auth 2-3x/week x 6 weeks (POC is 12- 2x/week x 6 weeks)  -    []no        [x]yes:     Pain level:  0-2/10     SUBJECTIVE:  Pt reports that he did well after his LV with increased strengthening, but still has pain when reaching overhead, rated 2/10. OBJECTIVE:   Observation:   Test measurements:  Flexion AROM 160, abduction 160 with pain at end range, functional IR L1 with stretching in FA, but not painful in ACJ.      RESTRICTIONS/PRECAUTIONS: Hx of cervical fusion (levels unknown), partial R supraspinatus tear, degenerative labral fraying, mod-severe AC arthritis      Exercises/Interventions:   Therapeutic Ex Sets/rep comments HEP   Pt ed: need to reduce forward shoulder position Reviewed 3'     Supine DNF iso 10\"x10     Pulleys flexion, abduction      Supine pec stretch ~75 deg ABD  pec stretch in samm stretch   on 1/2 FR  End of session X   Supine cane stretch flexion  Cane ER stretch 5\"x10 ea  X   Serratus reach 2x202#  X   Supine B ER c flexion [id number]    S/l ER 2#  X   Post capsule stretch- cross arm  cues for gently stretch X   B shoulder retraction at wall  cues for cervical pos X   TT row  Lat pull down  horiz abduction   TT ER,  isometric walkouts R,L     2x10  x10 ea  Red TT, blue for     Yellow   Yellow, red X   B UE ER - back to wall 0 peach    Prone sh ext R,L ea  \" row  \" MT 2x15  2x15  2x15 Time Over SWB 2#  0#    Wall push up plus                                                                        Manual Intervention      GHJ distractiono/oscillation, post GHJ, inf GHJ mobilizations GIII  Followed by PROM IR, ER 15\"x4 ea    5x ea     Flexion and ER improved without pain     Inf and post SCJ mobilization GIII      scap mobs- all  STM prince-scap     MWM flexion  Post clavicular glide    DTM UT and lev scap, post RC, XFM bicep LH, SH, and pecs 10'     PA mobs t-spine GIII T10-T4 3'           NMR re-education                                                          Therapeutic Exercise and NMR EXR  [x] (57225) Provided verbal/tactile cueing for activities related to strengthening, flexibility, endurance, ROM  for improvements in scapular, scapulothoracic and UE control with self care, reaching, carrying, lifting, house/yardwork, driving/computer work.    [] (01374) Provided verbal/tactile cueing for activities related to improving balance, coordination, kinesthetic sense, posture, motor skill, proprioception  to assist with  scapular, (58499)  [] ES(attended) (19095)      [] ES (un) (44462):     Ashish Eduardo stated goal: No pain when sleeping or reaching     Therapist goals for Patient:   Short Term Goals: To be achieved in: 2 weeks  1. Independent in HEP and progression per patient tolerance, in order to prevent re-injury. 2. Patient will have a decrease in pain to facilitate improvement in movement, function, and ADLs as indicated by Functional Deficits.     Long Term Goals: To be achieved in: 6 weeks  1. Disability index score of 9% or less for the [facility] to assist with reaching prior level of function. 2. Patient will demonstrate increased AROM to 160 deg of shoulder flexion and abduction, functional IR to T10 and functional ER to T3 for all reaching and self-care activities. 3. Patient will demonstrate an increase in Strength to 5/5 for the right shoulder, RC, and elbow; at least 4+/5 for scapular stabilizers in order to complete pushing/pulling and lifting activities as needed for occupation. 4. Patient will return to laying on the right shoulder at night without discomfort as well as reach overhead without pain. 5. Patient will begin an indep gym program or GAP as appropriate. Progression Towards Functional goals:  [] Patient is progressing as expected towards functional goals listed. [] Progression is slowed due to complexities listed. [] Progression has been slowed due to co-morbidities. [x] Plan just implemented, too soon to assess goals progression  [] Other:     ASSESSMENT: Pt has improved shoulder flexion, abduction and ER ROM since beginning PT, but is still painful with abduction. Pt was short on time, so reduced TE this visit compared to LV/ Pt may benefit from a trial of US to his RC tendons and bicep SH to reduce inflammation. Could also obtain a script for bharathi from referrer to try on his anterior shoulder.     Treatment/Activity Tolerance:  [x] Patient tolerated treatment well [] Patient limited by tristan  [] Patient limited by pain  [] Patient limited by other medical complications  [] Other:     Prognosis: [x] Good [] Fair  [] Poor    Patient Requires Follow-up: [x] Yes  [] No    PLAN: Continue with scapular positioning and strengthening; try pulsed US NV  [x] Continue per plan of care [] Alter current plan (see comments)  [] Plan of care initiated [] Hold pending MD visit [] Discharge    Electronically signed by: Maricarmen Dyer, PT, DPT

## 2019-05-15 ENCOUNTER — HOSPITAL ENCOUNTER (OUTPATIENT)
Dept: PHYSICAL THERAPY | Age: 59
Setting detail: THERAPIES SERIES
Discharge: HOME | End: 2019-05-15
Payer: COMMERCIAL

## 2019-05-15 PROCEDURE — 97035 APP MDLTY 1+ULTRASOUND EA 15: CPT

## 2019-05-15 PROCEDURE — 97110 THERAPEUTIC EXERCISES: CPT

## 2019-05-15 PROCEDURE — 97140 MANUAL THERAPY 1/> REGIONS: CPT

## 2019-05-15 NOTE — FLOWSHEET NOTE
[facility], [address] [place]  Phone: [phone]  Fax [phone]      Physical Therapy Daily Treatment Note  Date:  5/15/2019    Patient Name:  Alfredo Chong    :  1960  MRN: [id number]  Restrictions/Precautions:    Medical/Treatment Diagnosis Information:  · Diagnosis: Z56.952C (ICD-10-CM) - Strain of right shoulder, initial encounter; M75.41 (ICD-10-CM) - Impingement syndrome of right shoulder  · Treatment Diagnosis: M25.511 Pain in right shoulder; M25.611 Stiffness in right shoulder  Insurance/Certification information:  PT Insurance Information: [facility]  Physician Information:  Referring Practitioner: Kendra Browne MD  Plan of care signed (Y/N):     Date of Patient follow up with Physician:19     G-Code (if applicable):      Date G-Code Applied: 19   PT G-Codes  Functional Assessment Tool Used: Quick DASH  Score: 25  Functional Limitation: Mobility: Walking and moving around  Mobility: Walking and Moving Around Current Status (): At least 20 percent but less than 40 percent impaired, limited or restricted  Mobility: Walking and Moving Around Goal Status (): At least 1 percent but less than 20 percent impaired, limited or restricted    Progress Note: [x]  Yes  []  No  Next due by: Visit #10      Latex Allergy:  [x]NO      []YES  Preferred Language for Healthcare:   [x]English       []other:    Visit # Insurance Allowable Requires auth   9 auth 2-3x/week x 6 weeks (POC is 12- 2x/week x 6 weeks)  -    []no        [x]yes:     Pain level:  0-2/10     SUBJECTIVE:  Pt states he's still having discomfort when he lifts his arm up and especially when out to the side. He feels he's reached a bit of a plateau with his pain, despite feeling like he's getting stronger.      OBJECTIVE:   Observation: TTP suprapin and RC tendon, thoracic PSs and biceps tendon short head;   Test measurements: Flexion AROM 156 and PROM 160, abduction 160 with pain at end range, functional IR L1 with stretch, functional ER thumb to C7     RESTRICTIONS/PRECAUTIONS: Hx of cervical fusion (levels unknown), partial R supraspinatus tear, degenerative labral fraying, mod-severe AC arthritis      Exercises/Interventions:   Therapeutic Ex Sets/rep comments HEP   Pt ed: need to reduce forward shoulder position Reviewed 3'     Supine DNF iso 10\"x10     Pulleys flexion, abduction      Supine pec stretch ~75 deg ABD  pec stretch in samm stretch   on 1/2 FR  End of session X   Supine cane stretch flexion  Cane ER stretch   X   Serratus reach 2x203#  X   Supine B ER c flexion [id number]    S/l ER 2#  X   Post capsule stretch- cross arm  cues for gently stretch X   B shoulder retraction at wall  cues for cervical pos X   TT row  Lat pull down  horiz abduction   TT ER,  isometric walkouts R,L     2x10  x10 ea  Red TT, blue for     Yellow   Red, green X   B UE ER - back to wall 0 peach    Prone sh ext R,L ea  \" row  \" MT 2x15  2x10  2x15 Over SWB 3#  3#  0#    Wall push up plus                                                                        Manual Intervention      GHJ distractiono/oscillation, post GHJ, inf GHJ mobilizations GIII  Followed by PROM IR, ER 15\"x4 ea    5x ea     Flexion and ER improved without pain     Inf and post SCJ mobilization GIII      scap mobs- all  STM prince-scap     MWM flexion  Post clavicular glide    DTM UT and lev scap, post RC, XFM bicep LH, SH, and pecs 10'     PA mobs t-spine GIII T10-T4 3'           NMR re-education                                                          Therapeutic Exercise and NMR EXR  [x] (52405) Provided verbal/tactile cueing for activities related to strengthening, flexibility, endurance, ROM  for improvements in scapular, scapulothoracic and UE control with self care, reaching, carrying, lifting, house/yardwork, driving/computer work.    [] (77611) Provided verbal/tactile cueing for activities related to improving balance, coordination, kinesthetic sense, posture, motor skill, proprioception  to assist with  scapular, scapulothoracic and UE control with self care, reaching, carrying, lifting, house/yardwork, driving/computer work. Therapeutic Activities:    [] (25514 or 68572) Provided verbal/tactile cueing for activities related to improving balance, coordination, kinesthetic sense, posture, motor skill, proprioception and motor activation to allow for proper function of scapular, scapulothoracic and UE control with self care, carrying, lifting, driving/computer work. Home Exercise Program:    [x] (36508) Reviewed/Progressed HEP activities related to strengthening, flexibility, endurance, ROM of scapular, scapulothoracic and UE control with self care, reaching, carrying, lifting, house/yardwork, driving/computer work  [] (94587) Reviewed/Progressed HEP activities related to improving balance, coordination, kinesthetic sense, posture, motor skill, proprioception of scapular, scapulothoracic and UE control with self care, reaching, carrying, lifting, house/yardwork, driving/computer work      Manual Treatments:  PROM / STM / Oscillations-Mobs:  G-I, II, III, IV (PA's, Inf., Post.)  [x] (13706) Provided manual therapy to mobilize soft tissue/joints of cervical/CT, scapular GHJ and UE for the purpose of modulating pain, promoting relaxation,  increasing ROM, reducing/eliminating soft tissue swelling/inflammation/restriction, improving soft tissue extensibility and allowing for proper ROM for normal function with self care, reaching, carrying, lifting, house/yardwork, driving/computer work    Modalities: US 50% . 3w/cm^2, 3 mHz x4' ea to biceps tendon and RC tendon ea.     Charges:  Timed Code Treatment Minutes: 58 (US)   Total Treatment Minutes: 68 (rest breaks)     [] EVAL (LOW) 00467 (typically 20 minutes face-to-face)  [] EVAL (MOD) 21054 (typically 30 minutes face-to-face)  [] EVAL (HIGH) 07572 (typically 45 minutes face-to-face)  [] RE-EVAL     [x] AV(18462) x  2  3:34-4:04  [] IONTO  [] NMR (47136) x      [] VASO  [x] Manual (64417) x  1   3:044-3:24 [x] Other:rest break 3:24-3:34, US 4:12  [] TA x       [] Mech Traction (83108)  [] ES(attended) (15004)      [] ES (un) (54074):     Jasiel Lino stated goal: No pain when sleeping or reaching     Therapist goals for Patient:   Short Term Goals: To be achieved in: 2 weeks  1. Independent in HEP and progression per patient tolerance, in order to prevent re-injury. 2. Patient will have a decrease in pain to facilitate improvement in movement, function, and ADLs as indicated by Functional Deficits.     Long Term Goals: To be achieved in: 6 weeks  1. Disability index score of 9% or less for the [facility] to assist with reaching prior level of function. 2. Patient will demonstrate increased AROM to 160 deg of shoulder flexion and abduction, functional IR to T10 and functional ER to T3 for all reaching and self-care activities. 3. Patient will demonstrate an increase in Strength to 5/5 for the right shoulder, RC, and elbow; at least 4+/5 for scapular stabilizers in order to complete pushing/pulling and lifting activities as needed for occupation. 4. Patient will return to laying on the right shoulder at night without discomfort as well as reach overhead without pain. 5. Patient will begin an indep gym program or GAP as appropriate. Progression Towards Functional goals:  [] Patient is progressing as expected towards functional goals listed. [x] Progression is slowed due to complexities listed. [] Progression has been slowed due to co-morbidities. [] Plan just implemented, too soon to assess goals progression  [] Other:     ASSESSMENT: Pt has functional PROM, improved AROM, but continues to have painful abduction and ER, thus have modified exc's.  Did at [address] to [facility] tendon/biceps 31 Rumax Swann this date but will as MD for a script for bharathi to

## 2019-05-16 ENCOUNTER — OFFICE VISIT (OUTPATIENT)
Dept: ORTHOPEDIC SURGERY | Age: 59
End: 2019-05-16
Payer: MEDICARE

## 2019-05-16 VITALS — BODY MASS INDEX: 33.13 KG/M2 | WEIGHT: 250 LBS | HEIGHT: 73 IN

## 2019-05-16 DIAGNOSIS — M17.11 ARTHRITIS OF RIGHT KNEE: ICD-10-CM

## 2019-05-16 DIAGNOSIS — M25.561 RIGHT KNEE PAIN, UNSPECIFIED CHRONICITY: Primary | ICD-10-CM

## 2019-05-16 PROCEDURE — 1036F TOBACCO NON-USER: CPT | Performed by: PHYSICIAN ASSISTANT

## 2019-05-16 PROCEDURE — G8417 CALC BMI ABV UP PARAM F/U: HCPCS | Performed by: PHYSICIAN ASSISTANT

## 2019-05-16 PROCEDURE — G8427 DOCREV CUR MEDS BY ELIG CLIN: HCPCS | Performed by: PHYSICIAN ASSISTANT

## 2019-05-16 PROCEDURE — 3017F COLORECTAL CA SCREEN DOC REV: CPT | Performed by: PHYSICIAN ASSISTANT

## 2019-05-16 PROCEDURE — 99213 OFFICE O/P EST LOW 20 MIN: CPT | Performed by: PHYSICIAN ASSISTANT

## 2019-05-16 PROCEDURE — 20610 DRAIN/INJ JOINT/BURSA W/O US: CPT | Performed by: PHYSICIAN ASSISTANT

## 2019-05-16 NOTE — PROGRESS NOTES
KNEE REPLACEMENT               LITHOTRIPSY      NECK SURGERY      PROSTATE BIOPSY  02/20/2018    Dr Christos Nunez       Family History   Problem Relation Age of Onset    Heart Disease Father     Diabetes Father     Other Father         copd    Cancer Mother         leukemia       Social History     Socioeconomic History    Marital status:      Spouse name: None    Number of children: None    Years of education: None    Highest education level: None   Occupational History    None   Social Needs    Financial resource strain: None    Food insecurity:     Worry: None     Inability: None    Transportation needs:     Medical: None     Non-medical: None   Tobacco Use    Smoking status: Never Smoker    Smokeless tobacco: Never Used   Substance and Sexual Activity    Alcohol use: No    Drug use: No    Sexual activity: None   Lifestyle    Physical activity:     Days per week: None     Minutes per session: None    Stress: None   Relationships    Social connections:     Talks on phone: None     Gets together: None     Attends Worship service: None     Active member of club or organization: None     Attends meetings of clubs or organizations: None     Relationship status: None    Intimate partner violence:     Fear of current or ex partner: None     Emotionally abused: None     Physically abused: None     Forced sexual activity: None   Other Topics Concern    None   Social History Narrative    None       Current Outpatient Medications   Medication Sig Dispense Refill    meloxicam (MOBIC) 15 MG tablet Take 1 tablet by mouth daily 30 tablet 3    ketorolac (TORADOL) 10 MG tablet Take 1 tablet by mouth every 6 hours as needed for Pain 15 tablet 0    Amoxicillin 500 MG TABS Take 4 tablets one hour prior to dental procedure 4 tablet 3    meloxicam (MOBIC) 15 MG tablet Take 1 tablet by mouth daily 30 tablet 5    meloxicam (MOBIC) 15 MG tablet Take 1 tablet by mouth daily 30 tablet 5    tamsulosin previous total knee arthroplasty is present. Range of motion is within normal limits. There is no gross instability. There are no rashes, ulcerations or lesions. Strength and tone are normal.      DIAGNOSTICS  Xrays obtained in office today: Yes  Xrays reviewed today: Yes  4 views of the right knee show   Fracture: no   Dislocation: no  Patellofemoral arthritis: mild  Medial compartment: moderate  Lateral compartment: mild  Varus deformity: No  Valgus deformity: No      ASSESSMENT (Medical Decision Making)    Maria E Beasley is a 66 y.o. male with the following diagnosis:  right mild-moderate knee arthritis worse in the medial compartment      ICD-10-CM    1. Right knee pain, unspecified chronicity M25.561 XR KNEE RIGHT (MIN 4 VIEWS)   2. Arthritis of right knee M17.11        His overall course is worsening despite conservative treatment      PLAN (Medical Decision Making)  Office Procedures:  Orders Placed This Encounter   Procedures    XR KNEE RIGHT (MIN 4 VIEWS)     Standing Status:   Future     Number of Occurrences:   1     Standing Expiration Date:   5/14/2020       Treatment Plan:    I discussed the diagnosis and treatment options with Maria E Beasley today. Today, we'll plan on injecting the patient's right knee with cortisone injection. He can continue to take his meloxicam and Tylenol as well as wearing his sleeve as needed. Patient was asked to follow-up in 4 weeks and we can re-evaluate him again at that time. Non-steroidal anti-inflammatories medications (NSAIDs) can be used to assist with pain control and to reduce inflammatory changes. These medications may be over-the-counter or prescribed. We discussed taking the NSAID properly and the precautions. The patient understands that this medication may potentially interfere with other medications. Patient was also instructed to immediately discontinue the medication is there is any possible complication.       Maria E Beasley was

## 2019-05-20 DIAGNOSIS — M19.019 OSTEOARTHRITIS OF AC (ACROMIOCLAVICULAR) JOINT: Primary | ICD-10-CM

## 2019-05-21 DIAGNOSIS — M19.019 OSTEOARTHRITIS OF AC (ACROMIOCLAVICULAR) JOINT: Primary | ICD-10-CM

## 2019-05-22 ENCOUNTER — HOSPITAL ENCOUNTER (OUTPATIENT)
Dept: PHYSICAL THERAPY | Age: 59
Setting detail: THERAPIES SERIES
Discharge: HOME | End: 2019-05-22
Payer: COMMERCIAL

## 2019-05-22 PROCEDURE — 97110 THERAPEUTIC EXERCISES: CPT

## 2019-05-22 PROCEDURE — 97140 MANUAL THERAPY 1/> REGIONS: CPT

## 2019-05-22 NOTE — FLOWSHEET NOTE
Triceps                            Punches            Cable Column   Rows 25# (B) 2x10 IH 25# 3x10 IH 25# 3x10                              Ext. 20# (B) 2x10 IH 20# 3x10 IH 20# 3x10                              Lat. Pulldown 35# (B) 2x10  IH 35# 3x10  IH 35# 3x10                              Biceps                                 Triceps 20# (B) 2x10  IH 20# 3x10 IH 20# 3x10         Modality declined Declined declined   Initials JLW EP JLW   Time spent one on one (workers comp) 12:59-1:22 3:27-3:56 3:23-3:54   Time spent with PT assistant

## 2019-05-22 NOTE — FLOWSHEET NOTE
[facility], [address] [place] [address] [place]  Phone: [phone]  Fax [phone]      Physical Therapy Daily Treatment Note  Date:  2019    Patient Name:  Champ Short    :  1960  MRN: [id number]  Restrictions/Precautions:    Medical/Treatment Diagnosis Information:  · Diagnosis: R92.328W (ICD-10-CM) - Strain of right shoulder, initial encounter; M75.41 (ICD-10-CM) - Impingement syndrome of right shoulder  · Treatment Diagnosis: M25.511 Pain in right shoulder; M25.611 Stiffness in right shoulder  Insurance/Certification information:  PT Insurance Information: [facility]  Physician Information:  Referring Practitioner: Janny Mario MD  Plan of care signed (Y/N):     Date of Patient follow up with Physician:19     G-Code (if applicable):      Date G-Code Applied: 19   PT G-Codes  Functional Assessment Tool Used: Quick DASH  Score: 25  Functional Limitation: Mobility: Walking and moving around  Mobility: Walking and Moving Around Current Status (): At least 20 percent but less than 40 percent impaired, limited or restricted  Mobility: Walking and Moving Around Goal Status (): At least 1 percent but less than 20 percent impaired, limited or restricted    Progress Note: [x]  Yes  []  No  Next due by: Visit #10      Latex Allergy:  [x]NO      []YES  Preferred Language for Healthcare:   [x]English       []other:    Visit # Insurance Allowable Requires auth   10-send prog note after visit 11 ([facility]) for re-auth and f/u with MD auth 2-3x/week x 6 weeks (POC is 12- 2x/week x 6 weeks)  -    []no        [x]yes:     Pain level:  0-2/10     SUBJECTIVE:  Pt states he continues to get pain 2/10 but it's not sharp. He feels he could deal with his arm as is at this point.       OBJECTIVE:   Observation: TTP suprapin and RC tendon, thoracic PSs and biceps tendon short head;   Test measurements:  Flexion AROM 156 and PROM 160, abduction 160 with pain at end range, functional IR L1 with stretch, functional ER thumb to C7     RESTRICTIONS/PRECAUTIONS: Hx of cervical fusion (levels unknown), partial R supraspinatus tear, degenerative labral fraying, mod-severe AC arthritis      Exercises/Interventions:   Therapeutic Ex Sets/rep comments HEP   Pt ed: need to reduce forward shoulder position Reviewed 3'     Supine DNF iso 10\"x10     Pulleys flexion, abduction      Supine pec stretch ~75 deg ABD  pec stretch in samm stretch   on 1/2 FR  End of session X   Supine cane stretch flexion  Cane ER stretch   X   Serratus reach 2x203#  X   Supine B ER c flexi [id number]    S/l ER 2#  X   SL abd long-arm  SL flexion long-arm     Post capsule stretch- cross arm  cues for gently stretch X   B shoulder retraction at wall  cues for cervical pos X   TT row  Lat pull down  horiz abduction   TT ER,  isometric walkouts R,L     2x10  x10 ea  Red TT, blue for     Yellow   Red, green X  with AT   B UE ER - back to wall 0 peach    Prone sh ext R,L ea  \" row  \" MT 2x15  2x10  2x15 Over SWB 4#  4#  0#    Wall push up plus  With AT                                                                      Manual Intervention      GHJ distractiono/oscillation, post GHJ, inf GHJ mobilizations GIII  Followed by PROM IR, ER 15\"x4 ea    5x ea     Flexion and ER improved without pain     Inf and post SCJ mobilization GIII      scap mobs- all  STM prince-scap     MWM flexion  Post clavicular glide    DTM UT and lev scap, post RC, XFM bicep LH, SH, and pecs 10'     PA mobs t-spine GIII T10-T4 3'           NMR re-education                                                          Therapeutic Exercise and NMR EXR  [x] (05536) Provided verbal/tactile cueing for activities related to strengthening, flexibility, endurance, ROM  for improvements in scapular, scapulothoracic and UE control with self care, reaching, carrying, lifting, house/yardwork, driving/computer work.     [] (34151) Provided verbal/tactile cueing for activities related to improving balance, coordination, kinesthetic sense, posture, motor skill, proprioception  to assist with  scapular, scapulothoracic and UE control with self care, reaching, carrying, lifting, house/yardwork, driving/computer work. Therapeutic Activities:    [] (26504 or 43302) Provided verbal/tactile cueing for activities related to improving balance, coordination, kinesthetic sense, posture, motor skill, proprioception and motor activation to allow for proper function of scapular, scapulothoracic and UE control with self care, carrying, lifting, driving/computer work.      Home Exercise Program:    [x] (85194) Reviewed/Progressed HEP activities related to strengthening, flexibility, endurance, ROM of scapular, scapulothoracic and UE control with self care, reaching, carrying, lifting, house/yardwork, driving/computer work  [] (99732) Reviewed/Progressed HEP activities related to improving balance, coordination, kinesthetic sense, posture, motor skill, proprioception of scapular, scapulothoracic and UE control with self care, reaching, carrying, lifting, house/yardwork, driving/computer work      Manual Treatments:  PROM / STM / Oscillations-Mobs:  G-I, II, III, IV (PA's, Inf., Post.)  [x] (87739) Provided manual therapy to mobilize soft tissue/joints of cervical/CT, scapular GHJ and UE for the purpose of modulating pain, promoting relaxation,  increasing ROM, reducing/eliminating soft tissue swelling/inflammation/restriction, improving soft tissue extensibility and allowing for proper ROM for normal function with self care, reaching, carrying, lifting, house/yardwork, driving/computer work    Modalities:     Charges:  Timed Code Treatment Minutes: 84   Total Treatment Minutes: 84     [] EVAL (LOW) 08451 (typically 20 minutes face-to-face)  [] EVAL (MOD) 62812 (typically 30 minutes face-to-face)  [] EVAL (HIGH) 95494 (typically 45 minutes face-to-face)  [] RE-EVAL     [x] DW(76534) x  5  2:45-3:23 with PT and 3:23-3:54 with AT   [] IONTO  [] NMR (34947) x      [] VASO  [x] Manual (59496) x  1   2:30-2:45 [] Other:rest break   [] TA x       [] Mech Traction (38299)  [] ES(attended) (74319)      [] ES (un) (36239):     Ayan Gale stated goal: No pain when sleeping or reaching     Therapist goals for Patient:   Short Term Goals: To be achieved in: 2 weeks  1. Independent in HEP and progression per patient tolerance, in order to prevent re-injury. 2. Patient will have a decrease in pain to facilitate improvement in movement, function, and ADLs as indicated by Functional Deficits.     Long Term Goals: To be achieved in: 6 weeks  1. Disability index score of 9% or less for the [facility] to assist with reaching prior level of function. 2. Patient will demonstrate increased AROM to 160 deg of shoulder flexion and abduction, functional IR to T10 and functional ER to T3 for all reaching and self-care activities. 3. Patient will demonstrate an increase in Strength to 5/5 for the right shoulder, RC, and elbow; at least 4+/5 for scapular stabilizers in order to complete pushing/pulling and lifting activities as needed for occupation. 4. Patient will return to laying on the right shoulder at night without discomfort as well as reach overhead without pain. 5. Patient will begin an indep gym program or GAP as appropriate. Progression Towards Functional goals:  [] Patient is progressing as expected towards functional goals listed. [x] Progression is slowed due to complexities listed. [] Progression has been slowed due to co-morbidities. [] Plan just implemented, too soon to assess goals progression  [] Other:     ASSESSMENT: Pt had less pain and was able to progress strengthening this date, pain remained <2/10 and only with active abd motion. He could perform gravity assisted abd without discomfort.  Has script for ionjoey to try on his anterior shoulder at [facility]. Treatment/Activity Tolerance:  [x] Patient tolerated treatment well [] Patient limited by fatique  [] Patient limited by pain  [] Patient limited by other medical complications  [] Other:     Prognosis: [x] Good [] Fair  [] Poor    Patient Requires Follow-up: [x] Yes  [] No    PLAN: Continue with scapular positioning and strengthening; pulsed US still until possible ionto script from MD. Sees MD in 1 more visits for f/u for continued conservative vs surgical tx.   [x] Continue per plan of care [] Alter current plan (see comments)  [] Plan of care initiated [] Hold pending MD visit [] Discharge    Electronically signed by: Kaylee Reilly, PT, DPT

## 2019-05-24 ENCOUNTER — HOSPITAL ENCOUNTER (OUTPATIENT)
Dept: PHYSICAL THERAPY | Age: 59
Setting detail: THERAPIES SERIES
Discharge: HOME | End: 2019-05-24
Payer: COMMERCIAL

## 2019-05-24 PROCEDURE — 97110 THERAPEUTIC EXERCISES: CPT

## 2019-05-24 PROCEDURE — 97140 MANUAL THERAPY 1/> REGIONS: CPT

## 2019-05-24 NOTE — FLOWSHEET NOTE
[facility],  [address] [place]  Phone: [phone]  Fax [phone]    ATHLETIC TRAINING [address]   Date:  2019    Patient Name:  Yoanna Friedman    :  1960  MRN: [id number]  Restrictions/Precautions:    Medical/Treatment Diagnosis Information:  · Diagnosis: M35.865K (ICD-10-CM) - Strain of right shoulder, initial encounter; M75.41 (ICD-10-CM) - Impingement syndrome of right shoulderR shld strain, impingement  · Treatment Diagnosis: M25.511 Pain in right shoulder; M25.611 Stiffness in right shoulderR shld pain, stiffness  Physician Information:  Referring Practitioner: Elle Green Post-op  2wks    4 wks 6 wks 8 wks   3wks 6 wks 9 wks 12 wks                        Activity Log                                                          DOS/DOI:                                                         Date: 5/1/19 05/10/19 5/22/19 5/24/19   ATC Commun:  1 day/wk, 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 Difficulty w/scap depression, heavy cueing. IH workout made UT activation  Out by 3          [employer]      Chop                            Chest                            ER Flip        OVL R/L Pullbacks 10x OVL R/L Pullbacks 15x OVL R/L Pullbacks 15x    Long/Short lever  Flex.                                     Scap.                                    ABd.               punch 4\" OTB 10x3\" heavy cues 4\" OTB 15x3\" heavy cues 4\" OTB 15x3\" heavy cues           Body/Cardio Blade Flex/Ext                                       IR/ER                                       ABd/ADd              Push-up      Plus                              Wall   30x  Cue for scap depression and retraction 30x  Cue for scap depression and retraction                        Table                           Floor              Ball on Wall   4-way 2x10 ea                Tramp              Theraband    Rows/Ext                             IR   RTB 2x10                          ER   YTB 2x10                          No money                             Horiz.  ABd  YTB w/ball Terminal Neck Extension 20x                           Biceps                             Triceps                             Punches              Cable Column   Rows 25# (B) 2x10 IH 25# 3x10 IH 25# 3x10 30# 3x10                              Ext. 20# (B) 2x10 IH 20# 3x10 IH 20# 3x10                               Lat. Pulldown 35# (B) 2x10  IH 35# 3x10  IH 35# 3x10                               Biceps                                  Triceps 20# (B) 2x10  IH 20# 3x10 IH 20# 3x10                               IR    10# 2x10                              ER    5# 2x10          Modality declined Declined declined declined   Initials JLW EP JLW DTM   Time spent one on one (workers comp) 12:59-1:22 3:27-3:56 3:23-3:54 2:50-3:00   Time spent with PT assistant

## 2019-05-24 NOTE — FLOWSHEET NOTE
measurements:  Flexion AROM 156 and PROM 160, abduction 160 with pain at end range, functional IR L1 with stretch, functional ER thumb to C7     RESTRICTIONS/PRECAUTIONS: Hx of cervical fusion (levels unknown), partial R supraspinatus tear, degenerative labral fraying, mod-severe AC arthritis      Exercises/Interventions:   Therapeutic Ex Sets/rep comments HEP   Pt ed: need to reduce forward shoulder position      Supine DNF iso 10\"x10     Pulleys flexion, abduction      Supine pec stretch ~75 deg ABD  pec stretch in samm stretch   on 1/2 FR  End of session X   Supine cane stretch flexion  Cane ER stretch   X   Serratus reach 2x2035  X   Supine B ER c flexi [id number]    S/l ER 2#  X   SL abd long-arm  SL flexion long-arm Resume [place]    Post capsule stretch- cross arm  cues for gently stretch X   B shoulder retraction at wall  cues for cervical pos X   TT row  Lat pull down  horiz abduction   TT ER,  isometric walkouts R,L     2x10  x10 ea  Red TT, blue for     Yellow   Red, green X  with AT   B UE ER - back to wall 0 peach    Prone sh ext R,L ea  \" row  \" MT 2x15  2x10  2x15 Over SWB 4#  4#  0#    Wall push up plus  With AT                                                                      Manual Intervention      GHJ distractiono/oscillation, post GHJ, inf GHJ mobilizations GIII  Followed by PROM IR, ER 15\"x4 ea    5x ea     Flexion and ER improved without pain     Inf and post SCJ mobilization GIII      scap mobs- all  STM prince-scap     MWM flexion  Post clavicular glide    DTM UT and lev scap, post RC, XFM bicep LH, SH, and pecs 10'     PA mobs t-spine GIII T10-T4  Resume NV          NMR re-education                                                          Therapeutic Exercise and NMR EXR  [x] (25195) Provided verbal/tactile cueing for activities related to strengthening, flexibility, endurance, ROM  for improvements in scapular, scapulothoracic and UE control with self care, reaching, carrying, lifting, (HIGH) 73238 (typically 45 minutes face-to-face)  [] RE-EVAL     [x] ZB(22683) x  3 2:00-2:08 re-assessment time, 2:30-2:50 with PT and 2:50-3pm with AT  [] IONTO  [] NMR (37625) x      [] VASO  [x] Manual (16391) x  1   2:08-2:30 [] Other:    [] TA x       [] Mech Traction (02334)  [] ES(attended) (45799)      [] ES (un) (89813):     Rosanne Kyle stated goal: No pain when sleeping or reaching     Therapist goals for Patient:   Short Term Goals: To be achieved in: 2 weeks  1. Independent in HEP and progression per patient tolerance, in order to prevent re-injury. 2. Patient will have a decrease in pain to facilitate improvement in movement, function, and ADLs as indicated by Functional Deficits.     Long Term Goals: To be achieved in: 6 weeks  1. Disability index score of 9% or less for the [facility] to assist with reaching prior level of function. 2. Patient will demonstrate increased AROM to 160 deg of shoulder flexion and abduction, functional IR to T10 and functional ER to T3 for all reaching and self-care activities. 3. Patient will demonstrate an increase in Strength to 5/5 for the right shoulder, RC, and elbow; at least 4+/5 for scapular stabilizers in order to complete pushing/pulling and lifting activities as needed for occupation. 4. Patient will return to laying on the right shoulder at night without discomfort as well as reach overhead without pain. 5. Patient will begin an indep gym program or GAP as appropriate. Progression Towards Functional goals:  [] Patient is progressing as expected towards functional goals listed. [x] Progression is slowed due to complexities listed. [] Progression has been slowed due to co-morbidities. [] Plan just implemented, too soon to assess goals progression  [] Other:     ASSESSMENT: see prog note; pt is awaiting bharathi calle from [address] [place] [address] (has script from MD).     Treatment/Activity Tolerance:  [x] Patient tolerated treatment well [] Patient limited by tristan  [] Patient limited by pain  [] Patient limited by other medical complications  [] Other:     Prognosis: [x] Good [] Fair  [] Poor    Patient Requires Follow-up: [x] Yes  [] No    PLAN: Continue with scapular positioning and strengthening; ionto once approved by [facility]; pulsed US until then as an option (pt does not feel helped)  [x] Continue per plan of care [] Alter current plan (see comments)  [] Plan of care initiated [] Hold pending MD visit [] Discharge    Electronically signed by: David Carvajal, PT, DPT

## 2019-05-24 NOTE — PROGRESS NOTES
[facility], [address] [facility]  [address] [place] [address], [address] [place] [address]  Phone: [phone]  Fax [phone]     Physical Therapy Re-Certification Plan of Care    Dear Referring Practitioner: Ami Nuñez MD,    We had the pleasure of treating the following patient for physical therapy services at [address] [place] [address]. A summary of our findings can be found in the updated assessment below. This includes our plan of care. If you have any questions or concerns regarding these findings, please do not hesitate to contact me at the office phone number checked above. Thank you for the referral.     Physician Signature:________________________________Date:__________________  By signing above, therapists plan is approved by physician      Patient: Kathie Brewer   : 1960   MRN: [id number]  Referring Physician: Referring Practitioner: Ami Nuñez MD      Evaluation Date: 2019      Medical Diagnosis Information:  · Diagnosis: B28.495A (ICD-10-CM) - Strain of right shoulder, initial encounter; M75.41 (ICD-10-CM) - Impingement syndrome of right shoulder   · Treatment Diagnosis: M25.511 Pain in right shoulder; M25.611 Stiffness in right shoulder   Insurance information: PT Insurance Information: [facility]   Date Range:19-19  Total visits:      Functional Index used: Quick DASH: 25%    SUBJECTIVE:  Pt states he's pleased with progress, feels his shoulder is more mobile, he can lay on his R side to sleep, and has no trouble with desk work. Though he has less pain, he still hurts when lifting overhead or to the side, and is sore following this activity.     Current Pain Scale: 2/10    Type: [x]Constant   []Intermitment  []Radiating [x]Localized  []other:     Functional Limitations: [x]Lifting/reaching [x]Grooming  []Carrying []ADL's  []Driving []Sports/Recreations   []Other:      OBJECTIVE:     MMT:  shoulder flex 5/5 B  abd 5/5 B  ER in neutral 4+/5 and 4+/5 at 90 R; 5/5 L  IR in neutral 4+/5 with pain and at 4/5 90; 5/5 L  Biceps 5/5 B  Rhomboids 5/5 B  MT 4-5 R and 4+/5 L  LT 3-/5, 4/5 L                             AROM/PROM:  Shoulder flex A 146/ 149 P  abd A120, P 169  ER functional thumb to C7, P 90  IR functional thumb to L3, P 63    Joint mobility:     []Normal    [x]Hypo-GHJ post, improved but still slightly limited inf, thoracic spine (c-spine limited by recent cervical fusion)   []Hyper    Palpation: supraspinatus and RC tendon, SH biceps tendon    Orthopedic Tests: full can -   Empty can  -  Neer +   HK +     Speed's -    Obryon's -    OTHER:      ASSESSMENT:       Response to Treatment:   [x]Patient is responding well to treatment and improvement is noted with regards  to goals   []Patient should continue to improve in reasonable time if they continue HEP   []Patient has plateaued and is no longer responding to skilled PT intervention    []Patient is getting worse and would benefit from return to referring MD   []Patient unable to adhere to initial POC      Functional deficiencies which affect ADL's and Reduce overall functional level:     [x]decreased RC/scapular strength and neuromuscular control - Reduced overall  functional level with carrying /lifting   [x]decreased UE ROM/joint mobility- Reduced overall functional level with  carrying /lifting    []pain/difficulty with driving and/or computer work- Reduced overall functional  level    []pain at end of day with ADL tasks- Reduced overall functional level   [x]pain/difficulty with lifting/reaching/carrying - Reduced overall functional level  with carrying and lifting   []unable to perform sport/recreational activity due to pain and dysfunction   []other:       Prognosis/Rehab Potential:    []Excellent   [x]Good    []Fair   []Poor:     Toleration of evaluation or treatment:    []Excellent   [x]Good    []Fair   []Poor     New or Updated Goals (if applicable):  [x] No

## 2019-05-28 ENCOUNTER — OFFICE VISIT (OUTPATIENT)
Dept: ORTHOPEDIC SURGERY | Age: 59
End: 2019-05-28
Payer: COMMERCIAL

## 2019-05-28 ENCOUNTER — HOSPITAL ENCOUNTER (OUTPATIENT)
Dept: PHYSICAL THERAPY | Age: 59
Setting detail: THERAPIES SERIES
End: 2019-05-28
Payer: COMMERCIAL

## 2019-05-28 VITALS — HEIGHT: 73 IN | BODY MASS INDEX: 33.13 KG/M2 | WEIGHT: 250 LBS

## 2019-05-28 DIAGNOSIS — M75.41 ROTATOR CUFF IMPINGEMENT SYNDROME, RIGHT: Primary | ICD-10-CM

## 2019-05-28 PROCEDURE — 99213 OFFICE O/P EST LOW 20 MIN: CPT | Performed by: ORTHOPAEDIC SURGERY

## 2019-05-28 NOTE — PROGRESS NOTES
Department of Orthopedic Surgery   Progress Note      Follow-Up: High-grade partial-thickness rotator cuff tear with acromioclavicular arthrosis    Subjective:     [place] reports that he is feeling reasonably good. 60-70% better. Work restrictions have been very tolerable and allowing him to work effectively. He is interested in continuing with this conservative course      Objective:     @IPVITALS(24)@    Review of Systems  Pertinent items are noted in HPI  Denies fever, chills, confusion, bowel/bladder active change. Review of systems reviewed from Patient History Form dated on May 28, 2019 and available in the patient's chart under the Media tab. Exam: Unchanged      Imaging:  Reviewed    Office Procedures:      Assessment:     High-grade partial-thickness rotator cuff tear with acromioclavicular arthrosis. Plan:      1:  We had a good visit today in excess of 15 minutes. I discussed continuing with his scapular stabilization and rotator cuff exercises. Discussed the importance of some caution with quick dynamic movements and posture. We feel comfortable progressing with conservative care for the time being.     Continue with his present restrictions         Follow-Up Visit:  3 months            [address] [place] [address] [facility] Surgery

## 2019-05-31 ENCOUNTER — HOSPITAL ENCOUNTER (OUTPATIENT)
Dept: PHYSICAL THERAPY | Age: 59
Setting detail: THERAPIES SERIES
Discharge: HOME | End: 2019-05-31
Payer: COMMERCIAL

## 2019-05-31 PROCEDURE — 97110 THERAPEUTIC EXERCISES: CPT

## 2019-05-31 PROCEDURE — 97140 MANUAL THERAPY 1/> REGIONS: CPT

## 2019-05-31 NOTE — FLOWSHEET NOTE
[facility], [address]  Phone: [phone]  Fax [phone]      Physical Therapy Daily Treatment Note  Date:  2019    Patient Name:  Reynold Cole    :  1960  MRN: [id number]  Restrictions/Precautions:    Medical/Treatment Diagnosis Information:  · Diagnosis: J52.400P (ICD-10-CM) - Strain of right shoulder, initial encounter; M75.41 (ICD-10-CM) - Impingement syndrome of right shoulder  · Treatment Diagnosis: M25.511 Pain in right shoulder; M25.611 Stiffness in right shoulder  Insurance/Certification information:  PT Insurance Information: [facility]  Physician Information:  Referring Practitioner: Neeta Almaguer MD  Plan of care signed (Y/N):     Date of Patient follow up with Physician:19     G-Code (if applicable):      Date G-Code Applied: 19   PT G-Codes  Functional Assessment Tool Used: Quick DASH  Score: 25  Functional Limitation: Mobility: Walking and moving around  Mobility: Walking and Moving Around Current Status (): At least 20 percent but less than 40 percent impaired, limited or restricted  Mobility: Walking and Moving Around Goal Status (): At least 1 percent but less than 20 percent impaired, limited or restricted    Progress Note: [x]  Yes  []  No  Next due by: Visit #10      Latex Allergy:  [x]NO      []YES  Preferred Language for Healthcare:   [x]English       []other:    Visit # Insurance Allowable Requires auth   12 auth 2x/week x 5 weeks   -   (22 visits total)    []no        [x]yes:     Pain level:  0-2/10     SUBJECTIVE:  Pt states he continues to get pain 2/10 but it's not sharp. Still has some pain when he lies on the right shoulder to sleep.       OBJECTIVE:   Observation: see prog note; TTP suprapin and RC tendon, thoracic PSs and biceps tendon short head;   Test measurements:  Flexion AROM 156 and PROM 160, abduction 160 with pain at end range, functional IR L1 with stretch, functional ER thumb to C7     RESTRICTIONS/PRECAUTIONS: Hx of cervical fusion (levels unknown), partial R supraspinatus tear, degenerative labral fraying, mod-severe AC arthritis      Exercises/Interventions:   Therapeutic Ex Sets/rep comments HEP   Pt ed: need to reduce forward shoulder position      Supine DNF iso with lift 5\"x10     Pulleys flexion, abduction      Supine pec stretch ~75 deg ABD  pec stretch in samm stretch   on 1/2 FR  End of session X   Supine cane stretch flexion  Cane ER stretch   X   Serratus reach 2x20#5  X   Supine B ER c flexion [id number]    S/l ER 2#  X   SL abd long-arm  SL flexion long-arm Resume NV    Post capsule stretch- cross arm  cues for gently stretch X   B shoulder retraction at wall  cues for cervical pos X   TT row  Lat pull down  horiz abduction   TT ER, IR isometric walkouts R,L     2x10  x10 ea  Red TT, blue for     red  Red, green X  with AT   B UE ER - back to wall 0 peach    Prone sh ext R,L ea  \" row  \" MT 2x15  2x15  2x15 Over SWB 4#  4#  0#    Wall push up plus  With AT                                                                      Manual Intervention      GHJ distractiono/oscillation, post GHJ, inf GHJ mobilizations GIII  Followed by PROM IR, ER 15\"x4 ea    5x ea     Flexion and ER improved without pain     Inf and post SCJ mobilization GIII      scap mobs- all  STM prince-scap     MWM flexion  Post clavicular glide    DTM UT and lev scap, post RC, XFM bicep LH, SH, and pecs 10'     PA mobs t-spine GIII T10-T4 3'           NMR re-education      BB bicep, tricep, IR/ER 30\"x2 ea                                                   Therapeutic Exercise and NMR EXR  [x] (92776) Provided verbal/tactile cueing for activities related to strengthening, flexibility, endurance, ROM  for improvements in scapular, scapulothoracic and UE control with self care, reaching, carrying, lifting, house/yardwork, driving/computer work.    [] (81542) Provided verbal/tactile cueing for activities related to improving balance, coordination, kinesthetic sense, posture, motor skill, proprioception  to assist with  scapular, scapulothoracic and UE control with self care, reaching, carrying, lifting, house/yardwork, driving/computer work. Therapeutic Activities:    [] (40173 or 98951) Provided verbal/tactile cueing for activities related to improving balance, coordination, kinesthetic sense, posture, motor skill, proprioception and motor activation to allow for proper function of scapular, scapulothoracic and UE control with self care, carrying, lifting, driving/computer work.      Home Exercise Program:    [x] (89656) Reviewed/Progressed HEP activities related to strengthening, flexibility, endurance, ROM of scapular, scapulothoracic and UE control with self care, reaching, carrying, lifting, house/yardwork, driving/computer work  [] (60602) Reviewed/Progressed HEP activities related to improving balance, coordination, kinesthetic sense, posture, motor skill, proprioception of scapular, scapulothoracic and UE control with self care, reaching, carrying, lifting, house/yardwork, driving/computer work      Manual Treatments:  PROM / STM / Oscillations-Mobs:  G-I, II, III, IV (PA's, Inf., Post.)  [x] (93469) Provided manual therapy to mobilize soft tissue/joints of cervical/CT, scapular GHJ and UE for the purpose of modulating pain, promoting relaxation,  increasing ROM, reducing/eliminating soft tissue swelling/inflammation/restriction, improving soft tissue extensibility and allowing for proper ROM for normal function with self care, reaching, carrying, lifting, house/yardwork, driving/computer work    Modalities:     Charges:  Timed Code Treatment Minutes: 58   Total Treatment Minutes: 60     [] EVAL (LOW) 42871 (typically 20 minutes face-to-face)  [] EVAL (MOD) 27197 (typically 30 minutes face-to-face)  [] EVAL (HIGH) 24749 (typically 45 minutes face-to-face)  [] RE-EVAL [x] QM(19970) x  3 1:48-2:15 rwith PT and 2:18-2:30 with AT  [] IONTO  [] NMR (47748) x      [] VASO  [x] Manual (35660) x  1   1:30-1:48 [] Other:    [] TA x       [] Mech Traction (24344)  [] ES(attended) (74816)      [] ES (un) (63022):     Aaron Zeng stated goal: No pain when sleeping or reaching     Therapist goals for Patient:   Short Term Goals: To be achieved in: 2 weeks  1. Independent in HEP and progression per patient tolerance, in order to prevent re-injury. 2. Patient will have a decrease in pain to facilitate improvement in movement, function, and ADLs as indicated by Functional Deficits.     Long Term Goals: To be achieved in: 6 weeks  1. Disability index score of 9% or less for the [facility] to assist with reaching prior level of function. 2. Patient will demonstrate increased AROM to 160 deg of shoulder flexion and abduction, functional IR to T10 and functional ER to T3 for all reaching and self-care activities. 3. Patient will demonstrate an increase in Strength to 5/5 for the right shoulder, RC, and elbow; at least 4+/5 for scapular stabilizers in order to complete pushing/pulling and lifting activities as needed for occupation. 4. Patient will return to laying on the right shoulder at night without discomfort as well as reach overhead without pain. 5. Patient will begin an indep gym program or GAP as appropriate. Progression Towards Functional goals:  [] Patient is progressing as expected towards functional goals listed. [x] Progression is slowed due to complexities listed. [] Progression has been slowed due to co-morbidities. [] Plan just implemented, too soon to assess goals progression  [] Other:     ASSESSMENT:pt is still benefiting from PT- would also benefit from iontophoresis to reduce impingement at end range motion- still awaiting approval from [facility].      Treatment/Activity Tolerance:  [x] Patient tolerated treatment well [] Patient limited by tristan  [] Patient limited by pain  [] Patient limited by other medical complications  [] Other:     Prognosis: [x] Good [] Fair  [] Poor    Patient Requires Follow-up: [x] Yes  [] No    PLAN: Continue with scapular positioning and strengthening; ionto once approved by [facility]; pulsed US until then as an option (pt does not feel helped)  [x] Continue per plan of care [] Alter current plan (see comments)  [] Plan of care initiated [] Hold pending MD visit [] Discharge    Electronically signed by: Sarah Laboy, PT, DPT

## 2019-05-31 NOTE — FLOWSHEET NOTE
[facility],  [address]  Phone: [phone]  Fax [phone]    ATHLETIC TRAINING [address] [facility]  Date:  2019    Patient Name:  Kenny Lindo    :  1960  MRN: [id number]  Restrictions/Precautions:    Medical/Treatment Diagnosis Information:  ·  R shld strain, impingement  ·  R shld pain, stiffness  Physician Information:   Dr. 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 [address] (push/pull)  Out by 3 Out by Alexi Nixon      Chop                           Chest                           ER Flip       OVL R/L Pullbacks 15x     Long/Short lever  Flex. Scap.                                   ABd.             punch 4\" OTB 15x3\" heavy cues           Body/Cardio Blade Flex/Ext                                      IR/ER                                      ABd/ADd            Push-up      Plus                             Wall  30x  Cue for scap depression and retraction                         Table                          Floor            Ball on Wall 4-way 2x10 ea                 Tramp            Theraband    Rows/Ext                            IR RTB 2x10                           ER YTB 2x10                           No money                            Horiz.  ABd                            Biceps                            Triceps                            Punches            Cable Column   Rows IH 25# 3x10 30# 3x10 30# 3x10                               Ext. IH 20# 3x10  40# 3x10                              Lat. Pulldown  IH 35# 3x10  40# 3x10 Biceps                                 Triceps IH 20# 3x10  40# 3x10                              IR  10# 2x10                               ER  5# 2x10          Modality declined declined declined-time   Initials JLW DTM JLW   Time spent one on one (workers comp) 3:23-3:54 2:50-3:00 2:18-2:30   Time spent with PT assistant

## 2019-06-07 ENCOUNTER — HOSPITAL ENCOUNTER (OUTPATIENT)
Dept: PHYSICAL THERAPY | Age: 59
Setting detail: THERAPIES SERIES
Discharge: HOME | End: 2019-06-07
Payer: COMMERCIAL

## 2019-06-07 PROCEDURE — 97110 THERAPEUTIC EXERCISES: CPT

## 2019-06-07 PROCEDURE — 97140 MANUAL THERAPY 1/> REGIONS: CPT

## 2019-06-07 NOTE — FLOWSHEET NOTE
[facility],  [address] [place]  Phone: [phone]  Fax [phone]      Physical Therapy Daily Treatment Note  Date:  2019    Patient Name:  Michael Mobley    :  1960  MRN: [id number]  Restrictions/Precautions:    Medical/Treatment Diagnosis Information:  · Diagnosis: B76.053B (ICD-10-CM) - Strain of right shoulder, initial encounter; M75.41 (ICD-10-CM) - Impingement syndrome of right shoulder  · Treatment Diagnosis: M25.511 Pain in right shoulder; M25.611 Stiffness in right shoulder  Insurance/Certification information:  PT Insurance Information: [facility]  Physician Information:  Referring Practitioner: Cori Savage MD  Plan of care signed (Y/N):     Date of Patient follow up with Physician:19     G-Code (if applicable):      Date G-Code Applied: 19   PT G-Codes  Functional Assessment Tool Used: Quick DASH  Score: 25  Functional Limitation: Mobility: Walking and moving around  Mobility: Walking and Moving Around Current Status (): At least 20 percent but less than 40 percent impaired, limited or restricted  Mobility: Walking and Moving Around Goal Status (): At least 1 percent but less than 20 percent impaired, limited or restricted    Progress Note: [x]  Yes  []  No  Next due by: Visit #10      Latex Allergy:  [x]NO      []YES  Preferred Language for Healthcare:   [x]English       []other:    Visit # Insurance Allowable Requires auth   13 auth 2x/week x 5 weeks   -   (22 visits total)    []no        [x]yes:     Pain level:  0-2/10     SUBJECTIVE:  Pt states he continues to get pain when reaching overhead, but it hasn't been bothering him as much lately. Still gets some pain when he lies on his right side.       OBJECTIVE:   Observation: see prog note; TTP suprapin and RC tendon, thoracic PSs and biceps tendon short head;   Test measurements:  Flexion AROM 156 and PROM 160, abduction 160 with pain at end range, functional IR L1 with stretch, functional ER thumb to C7     RESTRICTIONS/PRECAUTIONS: Hx of cervical fusion (levels unknown), partial R supraspinatus tear, degenerative labral fraying, mod-severe AC arthritis      Exercises/Interventions:   Therapeutic Ex Sets/rep comments HEP   Pt ed: need to reduce forward shoulder position      Supine DNF iso with lift  NV    Pulleys flexion, abduction      Supine pec stretch ~75 deg ABD  pec stretch in samm stretch   on 1/2 FR  End of session X   Supine cane stretch flexion  Cane ER stretch   X   Serratus reach 2x20#5  X   Supine B ER c flexion [id number]    S/l ER 3x103#  X   SL abd long-arm  SL flexion long-arm 2x10  ea2#    Post capsule stretch- cross arm  cues for gently stretch X   B shoulder retraction at wall  cues for cervical pos X   TT row  Lat pull down  horiz abduction   TT ER, IR isometric walkouts R,L     2x10  x10 ea  Red TT, blue for     Green  green X  with AT   B UE ER - back to wall 0 peach    Prone sh ext R,L ea  \" row  \" MT 2x15  2x15  2x15 Over SWB 3#  3#  3#    Wall push up plus  With AT    ER wall walk 3x5' OVL at wrists                                                                Manual Intervention      GHJ distractiono/oscillation, post GHJ, inf GHJ mobilizations GIII  Followed by PROM IR, ER 15\"x4 ea    5x ea     Flexion and ER improved without pain     Inf and post SCJ mobilization GIII      scap mobs- all  STM prince-scap     MWM flexion  Post clavicular glide    DTM  XFM bicep LH, SH, and pecs 10'     PA mobs t-spine GIII T10-T4            NMR re-education      BB bicep, tricep, IR/ER                                                    Therapeutic Exercise and NMR EXR  [x] (65875) Provided verbal/tactile cueing for activities related to strengthening, flexibility, endurance, ROM  for improvements in scapular, scapulothoracic and UE control with self care, reaching, carrying, lifting, house/yardwork, driving/computer work. [] (84440) Provided verbal/tactile cueing for activities related to improving balance, coordination, kinesthetic sense, posture, motor skill, proprioception  to assist with  scapular, scapulothoracic and UE control with self care, reaching, carrying, lifting, house/yardwork, driving/computer work. Therapeutic Activities:    [] (38402 or 54754) Provided verbal/tactile cueing for activities related to improving balance, coordination, kinesthetic sense, posture, motor skill, proprioception and motor activation to allow for proper function of scapular, scapulothoracic and UE control with self care, carrying, lifting, driving/computer work.      Home Exercise Program:    [x] (17672) Reviewed/Progressed HEP activities related to strengthening, flexibility, endurance, ROM of scapular, scapulothoracic and UE control with self care, reaching, carrying, lifting, house/yardwork, driving/computer work  [] (68829) Reviewed/Progressed HEP activities related to improving balance, coordination, kinesthetic sense, posture, motor skill, proprioception of scapular, scapulothoracic and UE control with self care, reaching, carrying, lifting, house/yardwork, driving/computer work      Manual Treatments:  PROM / STM / Oscillations-Mobs:  G-I, II, III, IV (PA's, Inf., Post.)  [x] (40083) Provided manual therapy to mobilize soft tissue/joints of cervical/CT, scapular GHJ and UE for the purpose of modulating pain, promoting relaxation,  increasing ROM, reducing/eliminating soft tissue swelling/inflammation/restriction, improving soft tissue extensibility and allowing for proper ROM for normal function with self care, reaching, carrying, lifting, house/yardwork, driving/computer work    Modalities:     Charges:  Timed Code Treatment Minutes: 45   Total Treatment Minutes: 45     [] EVAL (LOW) 84109 (typically 20 minutes face-to-face)  [] EVAL (MOD) 00304 (typically 30 minutes face-to-face)  [] EVAL (HIGH) 44247 (typically 45 minutes face-to-face)  [] RE-EVAL     [x] BB(13620) x  2 1:45-2:15 with PT  [] IONTO  [] NMR (03436) x      [] VASO  [x] Manual (83567) x  1   1:30-1:45 [] Other:    [] TA x       [] Mech Traction (24683)  [] ES(attended) (01273)      [] ES (un) (93713):     Sukh Mcconnell stated goal: No pain when sleeping or reaching     Therapist goals for Patient:   Short Term Goals: To be achieved in: 2 weeks  1. Independent in HEP and progression per patient tolerance, in order to prevent re-injury. 2. Patient will have a decrease in pain to facilitate improvement in movement, function, and ADLs as indicated by Functional Deficits.     Long Term Goals: To be achieved in: 6 weeks  1. Disability index score of 9% or less for the [facility] to assist with reaching prior level of function. 2. Patient will demonstrate increased AROM to 160 deg of shoulder flexion and abduction, functional IR to T10 and functional ER to T3 for all reaching and self-care activities. 3. Patient will demonstrate an increase in Strength to 5/5 for the right shoulder, RC, and elbow; at least 4+/5 for scapular stabilizers in order to complete pushing/pulling and lifting activities as needed for occupation. 4. Patient will return to laying on the right shoulder at night without discomfort as well as reach overhead without pain. 5. Patient will begin an indep gym program or GAP as appropriate. Progression Towards Functional goals:  [] Patient is progressing as expected towards functional goals listed. [x] Progression is slowed due to complexities listed. [] Progression has been slowed due to co-morbidities. [] Plan just implemented, too soon to assess goals progression  [] Other:     ASSESSMENT:pt is still benefiting from PT- would also benefit from iontophoresis to reduce impingement at end range motion- still awaiting approval from [facility]. Pt did not have time for weights with AT nor ice, but will ice at home.  Unable to attend PT next week d/t several other MD appts. He will resume therapy the following week 2x/week.      Treatment/Activity Tolerance:  [x] Patient tolerated treatment well [] Patient limited by fatique  [] Patient limited by pain  [] Patient limited by other medical complications  [] Other:     Prognosis: [x] Good [] Fair  [] Poor    Patient Requires Follow-up: [x] Yes  [] No    PLAN: Continue with scapular positioning and strengthening; ionto once approved by [facility]; pulsed US until then as an option (pt does not feel helped)  [x] Continue per plan of care [] Alter current plan (see comments)  [] Plan of care initiated [] Hold pending MD visit [] Discharge    Electronically signed by: Rober Joiner, PT, DPT

## 2019-06-10 ENCOUNTER — OFFICE VISIT (OUTPATIENT)
Dept: ORTHOPEDIC SURGERY | Age: 59
End: 2019-06-10
Payer: MEDICARE

## 2019-06-10 VITALS — BODY MASS INDEX: 33.86 KG/M2 | WEIGHT: 250 LBS | HEIGHT: 72 IN

## 2019-06-10 DIAGNOSIS — M17.11 ARTHRITIS OF RIGHT KNEE: Primary | ICD-10-CM

## 2019-06-10 DIAGNOSIS — M25.561 RIGHT KNEE PAIN, UNSPECIFIED CHRONICITY: ICD-10-CM

## 2019-06-10 PROCEDURE — G8417 CALC BMI ABV UP PARAM F/U: HCPCS | Performed by: ORTHOPAEDIC SURGERY

## 2019-06-10 PROCEDURE — 1036F TOBACCO NON-USER: CPT | Performed by: ORTHOPAEDIC SURGERY

## 2019-06-10 PROCEDURE — 99213 OFFICE O/P EST LOW 20 MIN: CPT | Performed by: ORTHOPAEDIC SURGERY

## 2019-06-10 PROCEDURE — 3017F COLORECTAL CA SCREEN DOC REV: CPT | Performed by: ORTHOPAEDIC SURGERY

## 2019-06-10 PROCEDURE — G8427 DOCREV CUR MEDS BY ELIG CLIN: HCPCS | Performed by: ORTHOPAEDIC SURGERY

## 2019-06-10 NOTE — PROGRESS NOTES
MD Kristian Alva PA-C         Orthopaedic Surgery and Sports Medicine    Chief Complaint:  Knee Pain (RIGHT)      History of Present Illness:  Yamile Moise is a 66 y.o. male here regarding right knee pain. The pain is located medial.   Patient feels this discomfort may be related to a known injury: No, but reports its been bothering him for years, even before his left total knee. The patient describes the symptoms as aching, sharp and stiff. Symptoms improve with rest, ice, avoiding painful activities. The symptoms are worse with activity, getting up from a chair, weight bearing. Discomfort has been progressive over time. Sleeping habits related to chief complaint: fair    Patient is a history of a left total knee arthroplasty performed in January 2018 which is done fairly well with. He did have an incident in February 2018 where he fell and struck his left knee. She did have significant pain in that knee afterwards and she may have been favoring his right knee which may have aggravated this right knee pain. He currently takes meloxicam on a daily basis. He thinks it is helping somewhat. He also wears a Copper Fit for his knee on a daily basis which he reports does give him a slight amount of relief due to compression. At his last visit on May 16, 2019 he received a cortisone injection in his right knee which he reports helped a great deal.  He is able to walk more comfortably and throughout the day his knee actually does fairly well. Continues to bother him at night. Outside reports reviewed: historical medical records. Medical History:  Patient's medications, allergies, past medical, surgical, social and family histories were reviewed and updated as appropriate.     Past Medical History:   Diagnosis Date    Acute cholecystitis     Arthritis     Hypertension     Kidney stones     Reflux     Umbilical hernia     Wears glasses     for reading       Past Surgical History:   Procedure Laterality Date    COLONOSCOPY      HERNIA REPAIR      umbilical    KNEE ARTHROSCOPY Left 8/31/2012    left knee meniscus    KNEE SURGERY Left 01/16/2018    LEFT TOTAL KNEE REPLACEMENT               LITHOTRIPSY      NECK SURGERY      PROSTATE BIOPSY  02/20/2018    Dr Pascale Win       Family History   Problem Relation Age of Onset    Heart Disease Father     Diabetes Father     Other Father         copd    Cancer Mother         leukemia       Social History     Socioeconomic History    Marital status:      Spouse name: None    Number of children: None    Years of education: None    Highest education level: None   Occupational History    None   Social Needs    Financial resource strain: None    Food insecurity:     Worry: None     Inability: None    Transportation needs:     Medical: None     Non-medical: None   Tobacco Use    Smoking status: Never Smoker    Smokeless tobacco: Never Used   Substance and Sexual Activity    Alcohol use: No    Drug use: No    Sexual activity: None   Lifestyle    Physical activity:     Days per week: None     Minutes per session: None    Stress: None   Relationships    Social connections:     Talks on phone: None     Gets together: None     Attends [employer] service: None     Active member of club or organization: None     Attends meetings of clubs or organizations: None     Relationship status: None    Intimate partner violence:     Fear of current or ex partner: None     Emotionally abused: None     Physically abused: None     Forced sexual activity: None   Other Topics Concern    None   Social History Narrative    None       Current Outpatient Medications   Medication Sig Dispense Refill    Dexamethasone Sodium Phosphate 20 MG/5ML SOLN Dispense 120mg/30ML by Iontophoresis route, applied by physical therapist in office.  1 vial 0    meloxicam (MOBIC) 15 MG tablet Take 1 extremity. Palpation: mild tenderness to palpation on the medial joint line. no effusion noted. Range of Motion:  Full, but painful    Special Tests: Lorene's test: not tested       Varus laxity at 30 degrees: negative       Valgus laxity at 30 degrees: negative    Strength: no gross motor weakness noted. Motor exam of the lower extremities show quadriceps, hamstrings, foot dorsiflexion and plantarflexion grossly intact. Neurologic & vascular: Sensation to both feet is grossly intact to light touch. The bilateral lower extremities are warm and well-perfused with brisk capillary refill. Additional Examinations:  Left Lower Extremity: Examination of the left lower extremity does not show any tenderness, deformity or injury. Evidence of previous total knee arthroplasty is present. Range of motion is within normal limits. There is no gross instability. There are no rashes, ulcerations or lesions. Strength and tone are normal.      DIAGNOSTICS  Xrays obtained in office today: No  Xrays reviewed today: Yes  4 views of the right knee show   Fracture: no   Dislocation: no  Patellofemoral arthritis: mild  Medial compartment: moderate  Lateral compartment: mild  Varus deformity: No  Valgus deformity: No      ASSESSMENT (Medical Decision Making)    Bishop Morales is a 66 y.o. male with the following diagnosis:  right mild-moderate knee arthritis worse in the medial compartment      ICD-10-CM    1. Arthritis of right knee M17.11    2. Right knee pain, unspecified chronicity M25.561        His overall course is worsening despite conservative treatment      PLAN (Medical Decision Making)  Office Procedures:  No orders of the defined types were placed in this encounter. Treatment Plan:    I discussed the diagnosis and treatment options with Bishop Morales today. At this time, the patient's right knee appears to be doing well overall.   We encouraged him to take his meloxicam and continue to work on

## 2019-06-12 ENCOUNTER — TELEPHONE (OUTPATIENT)
Dept: ORTHOPEDIC SURGERY | Age: 59
End: 2019-06-12

## 2019-06-12 NOTE — TELEPHONE ENCOUNTER
CALLED PATIENT TO DISCUSS APPT. TOMORROW. APPT IS STATING WE ARE SEEING PATIENT FOR L KNEE, HOWEVER HE IS ALREADY BEING SEEN BY DR. Rajesh Tiwari AND BEING SEEN FOR R SHOULDER BY DR Rayne Rich.  CALLING TO CLARIFY LMOM

## 2019-06-17 ENCOUNTER — HOSPITAL ENCOUNTER (OUTPATIENT)
Dept: PHYSICAL THERAPY | Age: 59
Setting detail: THERAPIES SERIES
Discharge: HOME | End: 2019-06-17
Payer: COMMERCIAL

## 2019-06-17 PROCEDURE — 97140 MANUAL THERAPY 1/> REGIONS: CPT

## 2019-06-17 PROCEDURE — 97110 THERAPEUTIC EXERCISES: CPT

## 2019-06-17 NOTE — FLOWSHEET NOTE
[facility], [address] [place] [address]  Phone: [phone]  Fax [phone]      Physical Therapy Daily Treatment Note  Date:  2019    Patient Name:  Valentina Cabello    :  1960  MRN: [id number]  Restrictions/Precautions:    Medical/Treatment Diagnosis Information:  · Diagnosis: E53.887Q (ICD-10-CM) - Strain of right shoulder, initial encounter; M75.41 (ICD-10-CM) - Impingement syndrome of right shoulder  · Treatment Diagnosis: M25.511 Pain in right shoulder; M25.611 Stiffness in right shoulder  Insurance/Certification information:  PT Insurance Information: [facility]  Physician Information:  Referring Practitioner: Edgar Molina MD  Plan of care signed (Y/N):     Date of Patient follow up with Physician:19     G-Code (if applicable):      Date G-Code Applied: 19   PT G-Codes  Functional Assessment Tool Used: Quick DASH  Score: 25  Functional Limitation: Mobility: Walking and moving around  Mobility: Walking and Moving Around Current Status (): At least 20 percent but less than 40 percent impaired, limited or restricted  Mobility: Walking and Moving Around Goal Status (): At least 1 percent but less than 20 percent impaired, limited or restricted    Progress Note: [x]  Yes  []  No  Next due by: Visit #10      Latex Allergy:  [x]NO      []YES  Preferred Language for Healthcare:   [x]English       []other:    Visit # Insurance Allowable Requires auth   14 auth 2x/week x 5 weeks   -   (22 visits total)    []no        [x]yes:     Pain level:  0-2/10     SUBJECTIVE:  Pt states he continues to get pain when reaching overhead, but as long as he doesn't reach overhead too much, it has been good.       OBJECTIVE:   Observation: see prog note; TTP suprapin and RC tendon, thoracic PSs and biceps tendon short head;   Test measurements:  Flexion AROM 156 and PROM 160, abduction 160 with pain at end range, functional IR L1 with stretch, functional ER thumb to C7     RESTRICTIONS/PRECAUTIONS: Hx of cervical fusion (levels unknown), partial R supraspinatus tear, degenerative labral fraying, mod-severe AC arthritis      Exercises/Interventions:   Therapeutic Ex Sets/rep comments HEP   Pt ed: need to reduce forward shoulder position      Supine DNF iso with lift 5\"x10     Pulleys flexion, abduction      Supine pec stretch ~75 deg ABD  pec stretch in samm stretch   on 1/2 FR  End of session X   Supine cane stretch flexion  Cane ER stretch   X   Serratus reach 2x20#5  X   Supine B ER c flexion [id number]    S/l ER 2x153#  X   SL abd long-arm  SL flexion long-arm 2x10  ea4#    Post capsule stretch- cross arm  Sleeper stretch 15\"x5 cues for gently stretch X   B shoulder retraction at wall  cues for cervical pos X   TT row  Lat pull down  horiz abduction   TT ER, IR isometric walkouts R,L     2x10  x10 ea  Red TT, blue for     Green  green X  with AT   B UE ER - back to wall 0 peach    Prone sh ext R,L ea  \" row  \" MT 2x15  2x15  2x15 Over SWB 4#  4#  4#    Wall push up plus  With AT    ER wall walk 3x5' OVL at wrists                                                                Manual Intervention      GHJ distractiono/oscillation, post GHJ, inf GHJ mobilizations GIII  Followed by PROM IR, ER 15\"x4 ea    5x ea     Flexion and ER improved without pain     Inf and post SCJ mobilization GIII      scap mobs- all  STM prince-scap     MWM flexion  Post clavicular glide    DTM  XFM bicep LH, SH, and pecs 10'     PA mobs t-spine GIII T10-T4            NMR re-education      BB bicep, tricep, IR/ER                                                    Therapeutic Exercise and NMR EXR  [x] (86106) Provided verbal/tactile cueing for activities related to strengthening, flexibility, endurance, ROM  for improvements in scapular, scapulothoracic and UE control with self care, reaching, carrying, lifting, house/yardwork, driving/computer work.     [] face-to-face)  [] RE-EVAL     [x] SY(31372) x  3 2:10-2:47 (5' rest) with PT 2:48-3:00 1:1 with AT  [] IONTO  [] NMR (05987) x      [] VASO  [x] Manual (16302) x  1   2:00-2:10 [] Other:    [] TA x       [] Mech Traction (89311)  [] ES(attended) (67769)      [] ES (un) (44305):     Tobias Nesbitt stated goal: No pain when sleeping or reaching     Therapist goals for Patient:   Short Term Goals: To be achieved in: 2 weeks  1. Independent in HEP and progression per patient tolerance, in order to prevent re-injury. 2. Patient will have a decrease in pain to facilitate improvement in movement, function, and ADLs as indicated by Functional Deficits.     Long Term Goals: To be achieved in: 6 weeks  1. Disability index score of 9% or less for the [facility] to assist with reaching prior level of function. 2. Patient will demonstrate increased AROM to 160 deg of shoulder flexion and abduction, functional IR to T10 and functional ER to T3 for all reaching and self-care activities. 3. Patient will demonstrate an increase in Strength to 5/5 for the right shoulder, RC, and elbow; at least 4+/5 for scapular stabilizers in order to complete pushing/pulling and lifting activities as needed for occupation. 4. Patient will return to laying on the right shoulder at night without discomfort as well as reach overhead without pain. 5. Patient will begin an indep gym program or GAP as appropriate. Progression Towards Functional goals:  [] Patient is progressing as expected towards functional goals listed. [x] Progression is slowed due to complexities listed. [] Progression has been slowed due to co-morbidities. [] Plan just implemented, too soon to assess goals progression  [] Other:     ASSESSMENT:pt is still benefiting from PT- would also benefit from iontophoresis to reduce impingement at end range motion- still awaiting approval from [facility]. Was able to increase with weights as noted in treatment log section above. Treatment/Activity Tolerance:  [x] Patient tolerated treatment well [] Patient limited by fatique  [] Patient limited by pain  [] Patient limited by other medical complications  [] Other:     Prognosis: [x] Good [] Fair  [] Poor    Patient Requires Follow-up: [x] Yes  [] No    PLAN: Continue with scapular positioning and strengthening; ionto once approved by [facility]; pulsed US until then as an option (pt does not feel helped)  [x] Continue per plan of care [] Alter current plan (see comments)  [] Plan of care initiated [] Hold pending MD visit [] Discharge    Electronically signed by: Francis Turner, PT, DPT

## 2019-06-17 NOTE — FLOWSHEET NOTE
Ext. IH 20# 3x10  40# 3x10                               Lat. Pulldown  IH 35# 3x10  40# 3x10                               Biceps                                  Triceps IH 20# 3x10  40# 3x10                               IR  10# 2x10                                ER  5# 2x10            Modality declined declined declined-time declined   Initials JLW DTM JLW DB   Time spent one on one (workers comp) 3:23-3:54 2:50-3:00 2:18-2:30 2:48-3:00   Time spent with PT assistant

## 2019-06-20 ENCOUNTER — HOSPITAL ENCOUNTER (OUTPATIENT)
Dept: PHYSICAL THERAPY | Age: 59
Setting detail: THERAPIES SERIES
Discharge: HOME | End: 2019-06-20
Payer: COMMERCIAL

## 2019-06-20 PROCEDURE — 97140 MANUAL THERAPY 1/> REGIONS: CPT

## 2019-06-20 PROCEDURE — 97110 THERAPEUTIC EXERCISES: CPT

## 2019-06-20 NOTE — FLOWSHEET NOTE
Biceps                                  Triceps  40# 3x10  35# 3x10                              IR 10# 2x10                                 ER 5# 2x10             Modality declined declined-time declined declined   Initials DTM JLW DB DTM   Time spent one on one (workers comp) 2:50-3:00 2:18-2:30 2:48-3:00 1:34-1:52   Time spent with PT assistant

## 2019-06-20 NOTE — FLOWSHEET NOTE
[facility], [address] [place] [address]  Phone: [phone]  Fax [phone]      Physical Therapy Daily Treatment Note  Date:  2019    Patient Name:  Theo Guzman    :  1960  MRN: [id number]  Restrictions/Precautions:    Medical/Treatment Diagnosis Information:  · Diagnosis: M80.302F (ICD-10-CM) - Strain of right shoulder, initial encounter; M75.41 (ICD-10-CM) - Impingement syndrome of right shoulder  · Treatment Diagnosis: M25.511 Pain in right shoulder; M25.611 Stiffness in right shoulder  Insurance/Certification information:  PT Insurance Information: [facility]  Physician Information:  Referring Practitioner: Gina Mccray MD  Plan of care signed (Y/N):     Date of Patient follow up with Physician:19     G-Code (if applicable):      Date G-Code Applied: 19   PT G-Codes  Functional Assessment Tool Used: Quick DASH  Score: 25  Functional Limitation: Mobility: Walking and moving around  Mobility: Walking and Moving Around Current Status (): At least 20 percent but less than 40 percent impaired, limited or restricted  Mobility: Walking and Moving Around Goal Status (): At least 1 percent but less than 20 percent impaired, limited or restricted    Progress Note: [x]  Yes  []  No  Next due by: Visit #10      Latex Allergy:  [x]NO      []YES  Preferred Language for Healthcare:   [x]English       []other:    Visit # Insurance Allowable Requires auth   15 auth 2x/week x 5 weeks   -   (22 visits total)    []no        [x]yes:     Pain level:  0-2/10     SUBJECTIVE:  Pt states he continues to get pain when reaching to the side mainly, but he feels he can live with this as long as it doesn't get any worse than this.       OBJECTIVE:    Observation:  TTP suprapin and RC tendon, thoracic PSs and biceps tendon short head; upper trap  Test measurements:  Flexion AROM 156 and PROM 160, abduction 160 with face-to-face)  [] EVAL (HIGH) 22393 (typically 45 minutes face-to-face)  [] RE-EVAL     [x] SF(31819) x  2 1:16-1:31 with PT and then 1:34- 1:52 with AT  [] IONTO  [] NMR (11106) x      [] VASO  [x] Manual (49033) x  1   1:00-1:16 [] Other:     [] TA x       [] Mech Traction (44680)  [] ES(attended) (23568)      [] ES (un) (13816):     Johnathan Godoy stated goal: No pain when sleeping or reaching     Therapist goals for Patient:   Short Term Goals: To be achieved in: 2 weeks  1. Independent in HEP and progression per patient tolerance, in order to prevent re-injury. 2. Patient will have a decrease in pain to facilitate improvement in movement, function, and ADLs as indicated by Functional Deficits.     Long Term Goals: To be achieved in: 6 weeks  1. Disability index score of 9% or less for the [facility] to assist with reaching prior level of function. 2. Patient will demonstrate increased AROM to 160 deg of shoulder flexion and abduction, functional IR to T10 and functional ER to T3 for all reaching and self-care activities. 3. Patient will demonstrate an increase in Strength to 5/5 for the right shoulder, RC, and elbow; at least 4+/5 for scapular stabilizers in order to complete pushing/pulling and lifting activities as needed for occupation. 4. Patient will return to laying on the right shoulder at night without discomfort as well as reach overhead without pain. 5. Patient will begin an indep gym program or GAP as appropriate. Progression Towards Functional goals:  [] Patient is progressing as expected towards functional goals listed. [x] Progression is slowed due to complexities listed. [] Progression has been slowed due to co-morbidities. [] Plan just implemented, too soon to assess goals progression  [] Other:     ASSESSMENT:t Pt is progressing strength as noted by [employer]. He still has pain with end range flex and scap and + impingement tests, therefore will benefit from ionto.  Was

## 2019-06-24 ENCOUNTER — HOSPITAL ENCOUNTER (OUTPATIENT)
Dept: PHYSICAL THERAPY | Age: 59
Setting detail: THERAPIES SERIES
Discharge: HOME | End: 2019-06-24
Payer: COMMERCIAL

## 2019-06-24 PROCEDURE — 97110 THERAPEUTIC EXERCISES: CPT

## 2019-06-24 PROCEDURE — 97140 MANUAL THERAPY 1/> REGIONS: CPT

## 2019-06-24 NOTE — FLOWSHEET NOTE
[facility], [address] [place]  Phone: [phone]  Fax [phone]      Physical Therapy Daily Treatment Note  Date:  2019    Patient Name:  Reynold Cole    :  1960  MRN: [id number]  Restrictions/Precautions:    Medical/Treatment Diagnosis Information:  · Diagnosis: J20.113L (ICD-10-CM) - Strain of right shoulder, initial encounter; M75.41 (ICD-10-CM) - Impingement syndrome of right shoulder  · Treatment Diagnosis: M25.511 Pain in right shoulder; M25.611 Stiffness in right shoulder  Insurance/Certification information:  PT Insurance Information: [facility]  Physician Information:  Referring Practitioner: Neeta Almaguer MD  Plan of care signed (Y/N):     Date of Patient follow up with Physician:19     G-Code (if applicable):      Date G-Code Applied: 19   PT G-Codes  Functional Assessment Tool Used: Quick DASH  Score: 25  Functional Limitation: Mobility: Walking and moving around  Mobility: Walking and Moving Around Current Status (): At least 20 percent but less than 40 percent impaired, limited or restricted  Mobility: Walking and Moving Around Goal Status (): At least 1 percent but less than 20 percent impaired, limited or restricted    Progress Note: [x]  Yes  []  No  Next due by: Visit #10      Latex Allergy:  [x]NO      []YES  Preferred Language for Healthcare:   [x]English       []other:    Visit # Insurance Allowable Requires auth   16 auth 2x/week x 5 weeks   -   (22 visits total)    []no        [x]yes:     Pain level:  0-2/10     SUBJECTIVE:  Pt states he continues to get pain when reaching to the side mainly, but he feels he can live with this as long as it doesn't get any worse than this. WC would not approve his medication because they said that the doseage was too high.        OBJECTIVE:    Observation:  TTP suprapin and RC tendon, thoracic PSs and biceps tendon short head; upper trap  Test measurements:  Flexion AROM 156 and PROM 160, abduction 160 with pain at end ranges, functional IR L1 with stretch, functional ER thumb to C7      RESTRICTIONS/PRECAUTIONS: Hx of cervical fusion (levels unknown), partial R supraspinatus tear, degenerative labral fraying, mod-severe AC arthritis      Exercises/Interventions:   Therapeutic Ex Sets/rep comments HEP   Pt ed: need to reduce forward shoulder position      Supine DNF iso with lift 5\"x10     Pulleys flexion, abduction      Supine pec stretch ~75 deg ABD  pec stretch in samm stretch   on 1/2 FR  End of session X   Supine cane stretch flexion  Cane ER stretch   X   Serratus reach 2x20#6  X   Supine B ER c flexion [id number]    S/l ER 2x153#  X   SL abd long-arm  SL flexion long-arm 2x10  ea4#    Post capsule stretch- cross arm  Sleeper stretch 15\"x5 cues for gently stretch X   B shoulder retraction at wall  cues for cervical pos X   TT row  Lat pull down  horiz abduction   TT ER, IR isometric walkouts R,L     2x15    Red TT, blue for     Green  green X  with AT   B UE ER - back to wall 0 peach    Prone sh ext R,L ea  \" row  \" MT  Low trap 2x15  2x15  2x15  2x15 Over SWB 5#  5#  4#  0#    Wall push up plus  With AT    ER wall walk lateral  \" flex 3x5'  10x OVL at wrists                                                                Manual Intervention      GHJ distractiono/oscillation, post GHJ, inf GHJ mobilizations GIII  Followed by PROM IR, ER 15\"x4 ea    5x ea     Flexion and ER improved without pain     Inf and post SCJ mobilization GIII      scap mobs- all  STM prince-scap     MWM flexion  Post clavicular glide    DTM UT  XFM bicep LH, SH, and pecs 5'     PA mobs t-spine GIII T10-T4            NMR re-education      BB bicep, tricep, IR/ER, scaption @90 30\"x2 ea                                                   Therapeutic Exercise and NMR EXR  [x] (19355) Provided verbal/tactile cueing for activities related to strengthening, flexibility, endurance, ROM  for improvements in scapular, scapulothoracic and UE control with self care, reaching, carrying, lifting, house/yardwork, driving/computer work.    [] (45611) Provided verbal/tactile cueing for activities related to improving balance, coordination, kinesthetic sense, posture, motor skill, proprioception  to assist with  scapular, scapulothoracic and UE control with self care, reaching, carrying, lifting, house/yardwork, driving/computer work. Therapeutic Activities:    [] (15292 or 46723) Provided verbal/tactile cueing for activities related to improving balance, coordination, kinesthetic sense, posture, motor skill, proprioception and motor activation to allow for proper function of scapular, scapulothoracic and UE control with self care, carrying, lifting, driving/computer work.      Home Exercise Program:    [x] (77569) Reviewed/Progressed HEP activities related to strengthening, flexibility, endurance, ROM of scapular, scapulothoracic and UE control with self care, reaching, carrying, lifting, house/yardwork, driving/computer work  [] (49577) Reviewed/Progressed HEP activities related to improving balance, coordination, kinesthetic sense, posture, motor skill, proprioception of scapular, scapulothoracic and UE control with self care, reaching, carrying, lifting, house/yardwork, driving/computer work      Manual Treatments:  PROM / STM / Oscillations-Mobs:  G-I, II, III, IV (PA's, Inf., Post.)  [x] (41059) Provided manual therapy to mobilize soft tissue/joints of cervical/CT, scapular GHJ and UE for the purpose of modulating pain, promoting relaxation,  increasing ROM, reducing/eliminating soft tissue swelling/inflammation/restriction, improving soft tissue extensibility and allowing for proper ROM for normal function with self care, reaching, carrying, lifting, house/yardwork, driving/computer work    Modalities:     Charges:  Timed Code Treatment Minutes: 63   Total Treatment Minutes: 68 (rest break)      [] EVAL (LOW) 35238 (typically 20 minutes face-to-face)  [] EVAL (MOD) 45852 (typically 30 minutes face-to-face)  [] EVAL (HIGH) 94596 (typically 45 minutes face-to-face)  [] RE-EVAL     [x] KS(83129) x  3 2:03-2:08 (5'),  with PT and then 2:18-2:40 (22') with AT 2:45-3:11 (26') [] IONTO  [] NMR (86477) x      [] VASO  [x] Manual (98483) x  1   2:08-2:18 [] Other:     [] TA x       [] Mech Traction (70241)  [] ES(attended) (20861)      [] ES (un) (66365):     Ana Flatness stated goal: No pain when sleeping or reaching     Therapist goals for Patient:   Short Term Goals: To be achieved in: 2 weeks  1. Independent in HEP and progression per patient tolerance, in order to prevent re-injury. 2. Patient will have a decrease in pain to facilitate improvement in movement, function, and ADLs as indicated by Functional Deficits.     Long Term Goals: To be achieved in: 6 weeks  1. Disability index score of 9% or less for the [facility] to assist with reaching prior level of function. 2. Patient will demonstrate increased AROM to 160 deg of shoulder flexion and abduction, functional IR to T10 and functional ER to T3 for all reaching and self-care activities. 3. Patient will demonstrate an increase in Strength to 5/5 for the right shoulder, RC, and elbow; at least 4+/5 for scapular stabilizers in order to complete pushing/pulling and lifting activities as needed for occupation. 4. Patient will return to laying on the right shoulder at night without discomfort as well as reach overhead without pain. 5. Patient will begin an indep gym program or GAP as appropriate. Progression Towards Functional goals:  [] Patient is progressing as expected towards functional goals listed. [x] Progression is slowed due to complexities listed. [] Progression has been slowed due to co-morbidities.   [] Plan just implemented, too soon to assess goals progression  [] Other:     ASSESSMENT:t Pt is progressing strength as noted by inc[employer] resistance. He still has pain with end range flex and scap and + impingement tests, therefore will benefit from ionto.  Communicated with Dr. Toribio Zabala MA in order to f.u on doseage     Treatment/Activity Tolerance:  [x] Patient tolerated treatment well [] Patient limited by fatique  [] Patient limited by pain  [] Patient limited by other medical complications  [] Other:     Prognosis: [x] Good [] Fair  [] Poor    Patient Requires Follow-up: [x] Yes  [] No    PLAN: Continue with scapular positioning and strengthening; ionto when able  [x] Continue per plan of care [] Alter current plan (see comments)  [] Plan of care initiated [] Hold pending MD visit [] Discharge    Electronically signed by: Pauly Boswell, PT, DPT

## 2019-06-24 NOTE — FLOWSHEET NOTE
[facility],  [address] [place]  Phone: [phone]  Fax [phone]    ATHLETIC TRAINING [address]  Date:  2019    Patient Name:  Valentina Cabello    :  1960  MRN: [id number]  Restrictions/Precautions:    Medical/Treatment Diagnosis Information:  ·  R shld strain, impingement  ·  R shld pain, stiffness  Physician Information:   Dr. 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 2D x10 ea  Low Trap raise 15x                                Scap.   2D x10 ea                                  ABd. 2D x10 ea              punch   RTB 15x3\" RTB 20x3\"            Body/Cardio Blade Flex/Ext                                        IR/ER                                        ABd/ADd                Push-up      Plus                               Wall                             Table                            Floor                Ball on Wall                    Tramp                Theraband    Rows/Ext                              IR                              ER                              No money                              Horiz.  ABd                              Biceps                              Triceps                              Punches                Cable Column   Rows 30# 3x10 30# 3x10   40# 3x10 IH 25# 3x10 Ext.  40# 3x10  40# 3x10 IH 25# 3x10                              Lat. Pulldown  40# 3x10  45# 3x10 IH 40# 3x10                              Biceps                                   Triceps  40# 3x10  35# 3x10 IH 25# 3x10                              IR 10# 2x10                                  ER 5# 2x10               Table Top     Prone Chest on Airex  Scap depression and retration 15x3\"                   Modality declined declined-time declined declined Declined   Initials DTM JLW DB DTM EP   Time spent one on one (workers comp) 2:50-3:00 2:18-2:30 2:48-3:00 1:34-1:52 2:45-3:11   Time spent with PT assistant

## 2019-06-27 ENCOUNTER — HOSPITAL ENCOUNTER (OUTPATIENT)
Dept: PHYSICAL THERAPY | Age: 59
Setting detail: THERAPIES SERIES
Discharge: HOME | End: 2019-06-27
Payer: COMMERCIAL

## 2019-06-27 PROCEDURE — 97140 MANUAL THERAPY 1/> REGIONS: CPT

## 2019-06-27 PROCEDURE — 97110 THERAPEUTIC EXERCISES: CPT

## 2019-06-27 PROCEDURE — 97033 APP MDLTY 1+IONTPHRSIS EA 15: CPT

## 2019-06-27 NOTE — FLOWSHEET NOTE
[facility], [address] [place]  Phone: [phone]  Fax [phone]      Physical Therapy Daily Treatment Note  Date:  2019    Patient Name:  Champ Short    :  1960  MRN: [id number]  Restrictions/Precautions:    Medical/Treatment Diagnosis Information:  · Diagnosis: G66.457S (ICD-10-CM) - Strain of right shoulder, initial encounter; M75.41 (ICD-10-CM) - Impingement syndrome of right shoulder  · Treatment Diagnosis: M25.511 Pain in right shoulder; M25.611 Stiffness in right shoulder  Insurance/Certification information:  PT Insurance Information: [facility]  Physician Information:  Referring Practitioner: Janny Mario MD  Plan of care signed (Y/N):     Date of Patient follow up with Physician:19     G-Code (if applicable):      Date G-Code Applied: 19   PT G-Codes  Functional Assessment Tool Used: Quick DASH  Score: 25  Functional Limitation: Mobility: Walking and moving around  Mobility: Walking and Moving Around Current Status (): At least 20 percent but less than 40 percent impaired, limited or restricted  Mobility: Walking and Moving Around Goal Status (): At least 1 percent but less than 20 percent impaired, limited or restricted    Progress Note: [x]  Yes  []  No  Next due by: Visit #10      Latex Allergy:  [x]NO      []YES  Preferred Language for Healthcare:   [x]English       []other:    Visit # Insurance Allowable Requires auth   -ask for date ext before  auth 2x/week x 5 weeks   -   (22 visits total)    []no        [x]yes:     Pain level:  0-2/10     SUBJECTIVE:  Pt still has pain at end range of abd and flex. He got his ionto med today!        OBJECTIVE:    Observation:  TTP suprapin and RC tendon, thoracic PSs and biceps tendon short head; improved at upper trap  Test measurements:  F   RESTRICTIONS/PRECAUTIONS: Hx of cervical fusion (levels unknown), partial R tolerated treatment well [] Patient limited by fatique  [] Patient limited by pain  [] Patient limited by other medical complications  [] Other:     Prognosis: [x] Good [] Fair  [] Poor    Patient Requires Follow-up: [x] Yes  [] No    PLAN: Continue with scapular positioning and strengthening; ionto to RC tendon.   [x] Continue per plan of care [] Alter current plan (see comments)  [] Plan of care initiated [] Hold pending MD visit [] Discharge    Electronically signed by: Joe Pineda, PT, DPT

## 2019-06-27 NOTE — FLOWSHEET NOTE
[facility],  [address] [facility] [place]  Phone: [phone]  Fax [phone]    ATHLETIC TRAINING [address]   Date:  2019    Patient Name:  Jeff Guillen    :  1960  MRN: [id number]  Restrictions/Precautions:    Medical/Treatment Diagnosis Information:  ·  R shld strain, impingement  ·  R shld pain, stiffness  Physician Information:   Dr. 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 2D x10 ea  Low Trap raise 15x OVL ER forearms on wall x10                                Scap. 2D x10 ea                                   ABd. 2D x10 ea              punch RTB 15x3\" RTB 20x3\"  OVL 20x3\"          Body/Cardio Blade Flex/Ext                                       IR/ER                                       ABd/ADd              Push-up      Plus                              Wall                            Table                           Floor              Ball on Wall                   Tramp              Theraband    Rows/Ext                             IR                             ER                             No money                             Horiz.  ABd    RTB 2x10                         Biceps                             Triceps                             Punches              Cable Column   Rows  40# 3x10 IH 25# 3x10 IH 30# 3x10 Ext.  40# 3x10 IH 25# 3x10 IH 25# 3x10                              Lat. Pulldown  45# 3x10 IH 40# 3x10 IH 40# 3x10                              Biceps                                  Triceps  35# 3x10 IH 25# 3x10 IH 25# 3x10                              IR                                  ER              Table Top   Prone Chest on Airex  Scap depression and retraction 15x3\"                  Modality declined declined Declined ionto in-house   Initials DB DTM EP DTM   Time spent one on one (workers comp) 2:48-3:00 1:34-1:52 2:45-3:11 1:35-2:05   Time spent with PT assistant

## 2019-07-01 ENCOUNTER — HOSPITAL ENCOUNTER (OUTPATIENT)
Dept: PHYSICAL THERAPY | Age: 59
Setting detail: THERAPIES SERIES
Discharge: HOME | End: 2019-07-01
Payer: COMMERCIAL

## 2019-07-01 PROCEDURE — 97110 THERAPEUTIC EXERCISES: CPT

## 2019-07-01 PROCEDURE — 97140 MANUAL THERAPY 1/> REGIONS: CPT

## 2019-07-01 PROCEDURE — 97033 APP MDLTY 1+IONTPHRSIS EA 15: CPT

## 2019-07-01 NOTE — FLOWSHEET NOTE
resume missed exercises NV. Treatment/Activity Tolerance:  [x] Patient tolerated treatment well [] Patient limited by fatique  [] Patient limited by pain  [] Patient limited by other medical complications  [] Other:     Prognosis: [x] Good [] Fair  [] Poor    Patient Requires Follow-up: [x] Yes  [] No    PLAN: Continue with scapular positioning and strengthening; ionto to RC tendon; ask for date extension for remaining visits.    [x] Continue per plan of care [] Alter current plan (see comments)  [] Plan of care initiated [] Hold pending MD visit [] Discharge    Electronically signed by: Vilma Duffy, PT, DPT

## 2019-07-03 ENCOUNTER — HOSPITAL ENCOUNTER (OUTPATIENT)
Dept: PHYSICAL THERAPY | Age: 59
Setting detail: THERAPIES SERIES
Discharge: HOME | End: 2019-07-03
Payer: COMMERCIAL

## 2019-07-03 PROCEDURE — 97140 MANUAL THERAPY 1/> REGIONS: CPT

## 2019-07-03 PROCEDURE — 97110 THERAPEUTIC EXERCISES: CPT

## 2019-07-03 PROCEDURE — 97016 VASOPNEUMATIC DEVICE THERAPY: CPT

## 2019-07-03 PROCEDURE — 97033 APP MDLTY 1+IONTPHRSIS EA 15: CPT

## 2019-07-03 NOTE — FLOWSHEET NOTE
measurements:  F      RESTRICTIONS/PRECAUTIONS: Hx of cervical fusion (levels unknown), partial R supraspinatus tear, degenerative labral fraying, mod-severe AC arthritis      Exercises/Interventions:   Therapeutic Ex Sets/rep comments HEP   Pt ed: need to reduce forward shoulder position      Supine DNF iso with lift 10\"x10     Pulleys flexion, abduction      Supine pec stretch ~75 deg ABD  pec stretch in samm stretch   on 1/2 FR  End of session X   Supine cane stretch flexion  Cane ER stretch   X   Serratus reach on CC   2x20  15#  X    B ER c flexion-standing OVL full ROM    S/l ER 3x104#  X   SL abd long-arm  SL flexion long-arm 5#    Post capsule stretch- cross arm  Sleeper stretch 15\"x5 cues for gently stretch X   B shoulder retraction at wall  cues for cervical pos X   TT row  Lat pull down  horiz abduction   TT ER, IR isometric walkouts R,L         Red TT, blue for     Green  green X  with AT   B UE ER - back to wall 3\"x20 GVL    Prone sh ext R,L ea  \" row  \" MT  Low trap      Over SWB 5#  5#  4#  0#    Wall push up plus  With AT    ER wall walk lateral  \" flex- facing wall   10x OVL at wrists With AT   Low trap tug  30# Review with AT                                                         Manual Intervention      GHJ distractiono/oscillation, post GHJ, inf GHJ mobilizations GIII  Followed by PROM IR, ER 15\"x4 ea    5x ea     Flexion and ER improved without pain     Inf and post SCJ mobilization GIII      scap mobs- all  STM prince-scap 2'    MWM flexion  Post clavicular glide    DTM UT  XFM bicep LH, SH, and pecs 10'     PA mobs t-spine GIII T10-T4 4'           NMR re-education      BB , IR/ER in neutral, scaption @90  Flex at 90 2x30\" ea                                                   Therapeutic Exercise and NMR EXR  [x] (69401) Provided verbal/tactile cueing for activities related to strengthening, flexibility, endurance, ROM  for improvements in scapular, scapulothoracic and UE control with self care, reaching, carrying, lifting, house/yardwork, driving/computer work.    [] (59537) Provided verbal/tactile cueing for activities related to improving balance, coordination, kinesthetic sense, posture, motor skill, proprioception  to assist with  scapular, scapulothoracic and UE control with self care, reaching, carrying, lifting, house/yardwork, driving/computer work. Therapeutic Activities:    [] (15422 or 83772) Provided verbal/tactile cueing for activities related to improving balance, coordination, kinesthetic sense, posture, motor skill, proprioception and motor activation to allow for proper function of scapular, scapulothoracic and UE control with self care, carrying, lifting, driving/computer work.      Home Exercise Program:    [x] (39274) Reviewed/Progressed HEP activities related to strengthening, flexibility, endurance, ROM of scapular, scapulothoracic and UE control with self care, reaching, carrying, lifting, house/yardwork, driving/computer work  [] (04125) Reviewed/Progressed HEP activities related to improving balance, coordination, kinesthetic sense, posture, motor skill, proprioception of scapular, scapulothoracic and UE control with self care, reaching, carrying, lifting, house/yardwork, driving/computer work      Manual Treatments:  PROM / STM / Oscillations-Mobs:  G-I, II, III, IV (PA's, Inf., Post.)  [x] (83553) Provided manual therapy to mobilize soft tissue/joints of cervical/CT, scapular GHJ and UE for the purpose of modulating pain, promoting relaxation,  increasing ROM, reducing/eliminating soft tissue swelling/inflammation/restriction, improving soft tissue extensibility and allowing for proper ROM for normal function with self care, reaching, carrying, lifting, house/yardwork, driving/computer work    Modalities: ionto to go patch RC tendon (5' set-up from AT)    Charges:  Timed Code Treatment Minutes: 47   Total Treatment Minutes: 52 (ionto/set-up)       [] EVAL (LOW) [phone] (typically

## 2019-07-03 NOTE — FLOWSHEET NOTE
3x10 IH 30# 3x10                               Ext.  40# 3x10 IH 25# 3x10 IH 25# 3x10                               Lat. Pulldown 45# 3x10 IH 40# 3x10 IH 40# 3x10                               Biceps                                  Triceps 35# 3x10 IH 25# 3x10 IH 25# 3x10                               IR                                  ER              Table Top  Prone Chest on Airex  Scap depression and retraction 15x3\"  Plank w/Plus 15x                 Modality declined Declined ionto in-house IONTO 120mA  GT of humerus    Initials DTM EP DTM EP   Time spent one on one (workers comp) 1:34-1:52 2:45-3:11 1:35-2:05 1:22-1:47   Time spent with PT assistant

## 2019-07-10 ENCOUNTER — HOSPITAL ENCOUNTER (OUTPATIENT)
Dept: PHYSICAL THERAPY | Age: 59
Setting detail: THERAPIES SERIES
Discharge: HOME | End: 2019-07-10
Payer: COMMERCIAL

## 2019-07-10 ENCOUNTER — OFFICE VISIT (OUTPATIENT)
Dept: ORTHOPEDIC SURGERY | Age: 59
End: 2019-07-10
Payer: COMMERCIAL

## 2019-07-10 VITALS — BODY MASS INDEX: 33.86 KG/M2 | WEIGHT: 250 LBS | HEIGHT: 72 IN

## 2019-07-10 DIAGNOSIS — Z96.652 STATUS POST TOTAL LEFT KNEE REPLACEMENT: Primary | ICD-10-CM

## 2019-07-10 PROCEDURE — 97033 APP MDLTY 1+IONTPHRSIS EA 15: CPT

## 2019-07-10 PROCEDURE — 97112 NEUROMUSCULAR REEDUCATION: CPT

## 2019-07-10 PROCEDURE — 99213 OFFICE O/P EST LOW 20 MIN: CPT | Performed by: ORTHOPAEDIC SURGERY

## 2019-07-10 PROCEDURE — 97110 THERAPEUTIC EXERCISES: CPT

## 2019-07-10 PROCEDURE — 97140 MANUAL THERAPY 1/> REGIONS: CPT

## 2019-07-10 NOTE — FLOWSHEET NOTE
driving/computer work    Modalities: ionto to go patch RC tendon (5' set-up from AT)    Charges:  Timed Code Treatment Minutes: 71   Total Treatment Minutes: 76 (ionto/set-up)       [] EVAL (LOW) 71674 (typically 20 minutes face-to-face)  [] EVAL (MOD) 66332 (typically 30 minutes face-to-face)  [] EVAL (HIGH) 50575 (typically 45 minutes face-to-face)  [] RE-EVAL     [x] BV(17164) x  3 12:47-12:53 with PT then with AT 1:06-1:41     [x] IONTO 1:41-1:46  [x] NMR (86180) x  1 12:53-1:06   [] VASO  [x] Manual (79276) x  1   12:32-12:47 [] Other:     [] TA x       [] Mech Traction (15192)  [] ES(attended) (97906)      [] ES (un) (94931):     Giovanna Client stated goal: No pain when sleeping or reaching     Therapist goals for Patient:   Short Term Goals: To be achieved in: 2 weeks  1. Independent in HEP and progression per patient tolerance, in order to prevent re-injury. 2. Patient will have a decrease in pain to facilitate improvement in movement, function, and ADLs as indicated by Functional Deficits.     Long Term Goals: To be achieved in: 6 weeks  1. Disability index score of 9% or less for the [facility] to assist with reaching prior level of function. 2. Patient will demonstrate increased AROM to 160 deg of shoulder flexion and abduction, functional IR to T10 and functional ER to T3 for all reaching and self-care activities. 3. Patient will demonstrate an increase in Strength to 5/5 for the right shoulder, RC, and elbow; at least 4+/5 for scapular stabilizers in order to complete pushing/pulling and lifting activities as needed for occupation. 4. Patient will return to laying on the right shoulder at night without discomfort as well as reach overhead without pain. 5. Patient will begin an indep gym program or GAP as appropriate. Progression Towards Functional goals:  [x] Patient is progressing as expected towards functional goals listed. [] Progression is slowed due to complexities listed.   [] Progression has been slowed due to co-morbidities. [] Plan just implemented, too soon to assess goals progression  [] Other:     ASSESSMENT: Pt has plateaued with progress, still having pain with active abd/scaption despite overall improved strength and pain levels and functional AROM. Will utilize remaining 2 PT sessions to utilize ionto , then do recommend pt f/u with MD to discuss his options.      Treatment/Activity Tolerance:  [x] Patient tolerated treatment well [] Patient limited by fatique  [] Patient limited by pain  [] Patient limited by other medical complications  [] Other:     Prognosis: [x] Good [] Fair  [] Poor    Patient Requires Follow-up: [x] Yes  [] No    PLAN: Continue with scapular positioning and strengthening; ionto to RC tendon 2 more sessions to use covered visits then f/u with MD.   [x] Continue per plan of care [] Alter current plan (see comments)  [] Plan of care initiated [] Hold pending MD visit [] Discharge    Electronically signed by: Judson Keen, PT, DPT

## 2019-07-12 ENCOUNTER — HOSPITAL ENCOUNTER (OUTPATIENT)
Dept: PHYSICAL THERAPY | Age: 59
Setting detail: THERAPIES SERIES
Discharge: HOME | End: 2019-07-12
Payer: COMMERCIAL

## 2019-07-12 ENCOUNTER — TELEPHONE (OUTPATIENT)
Dept: ORTHOPEDIC SURGERY | Age: 59
End: 2019-07-12

## 2019-07-12 PROCEDURE — 97110 THERAPEUTIC EXERCISES: CPT

## 2019-07-12 PROCEDURE — 97033 APP MDLTY 1+IONTPHRSIS EA 15: CPT

## 2019-07-12 PROCEDURE — 97112 NEUROMUSCULAR REEDUCATION: CPT

## 2019-07-12 PROCEDURE — 97140 MANUAL THERAPY 1/> REGIONS: CPT

## 2019-07-12 NOTE — FLOWSHEET NOTE
fusion (levels unknown), partial R supraspinatus tear, degenerative labral fraying, mod-severe AC arthritis      Exercises/Interventions:   Therapeutic Ex Sets/rep comments HEP   Pt ed: need to reduce forward shoulder position      Supine DNF iso with lift      Pulleys flexion, abduction      Supine pec stretch ~75 deg ABD  pec stretch in samm stretch   on 1/2 FR  End of session X   Supine cane stretch flexion  Cane ER stretch   X   Serratus reach on CC   2x20  15#  X    B ER c flexion-standing OVL full ROM    S/l ER 4#  X   SL abd long-arm  SL flexion long-arm 5#    Post capsule stretch- cross arm  Sleeper stretch  Shoulder IR S lat and sup   15\"5 ea cues for gently stretch X   B shoulder retraction at wall  cues for cervical pos X   TT row  Lat pull down  horiz abduction   TT ER, IR isometric walkouts R,L         Red TT, blue for     Green  green X  with AT   B UE ER - back to wall 3\"x20 GVL With AT   Prone sh ext R,L ea  \" row  \" MT  Low trap      Over SWB 5#  5#  4#  0#    Wall push up plus  With AT    ER wall walk lateral  \" flex- facing wall   10x OVL at wrists With AT   Low trap tug  Single arm row 2x15  2x15 2 Blue ttubes                                                          Manual Intervention      GHJ distractiono/oscillation, post GHJ, inf GHJ mobilizations GIII  Followed by PROM flex, abd, IR, ER 15\"x4 ea  15\"X5 ea  x5 ea     Flexion and ER improved without pain     Inf and post SCJ mobilization GIII      scap mobs- all  STM prince-scap     MWM flexion  Post clavicular glide    DTM UT supraspinatus,  XFM  4'     PA mobs t-spine GIII T10-T4            NMR re-education      BB bicep, tricep, IR/ER in neutral,   Flex at 90 2x30\" ea     Ball tap on wall: flex and scap x30 ea 2#    D1 ext 2x15 Blue tband                                      Therapeutic Exercise and NMR EXR  [x] (44293) Provided verbal/tactile cueing for activities related to strengthening, flexibility, endurance, ROM  for improvements in with progress, still having pain with active abd/scaption despite overall improved strength and pain levels and functional AROM. Will utilize remaining 1 PT sessions to utilize ionto , then do recommend pt f/u with MD to discuss his options.      Treatment/Activity Tolerance:  [x] Patient tolerated treatment well [] Patient limited by fatique  [] Patient limited by pain  [] Patient limited by other medical complications  [] Other:     Prognosis: [x] Good [] Fair  [] Poor    Patient Requires Follow-up: [x] Yes  [] No    PLAN: Continue with scapular positioning and strengthening; ionto to RC tendon 1 more sessions to use covered visits then f/u with MD.   [x] Continue per plan of care [] Alter current plan (see comments)  [] Plan of care initiated [] Hold pending MD visit [] Discharge    Electronically signed by: Pranay Merino, PT, DPT

## 2019-07-17 ENCOUNTER — HOSPITAL ENCOUNTER (OUTPATIENT)
Dept: PHYSICAL THERAPY | Age: 59
Setting detail: THERAPIES SERIES
Discharge: HOME | End: 2019-07-17
Payer: COMMERCIAL

## 2019-07-17 PROCEDURE — 97140 MANUAL THERAPY 1/> REGIONS: CPT

## 2019-07-17 PROCEDURE — 97033 APP MDLTY 1+IONTPHRSIS EA 15: CPT

## 2019-07-17 PROCEDURE — 97110 THERAPEUTIC EXERCISES: CPT

## 2019-07-17 PROCEDURE — 97112 NEUROMUSCULAR REEDUCATION: CPT

## 2019-07-17 NOTE — FLOWSHEET NOTE
[facility],  [address] [place]  Phone: [phone]  Fax [phone]    ATHLETIC TRAINING [address]  Date:  2019    Patient Name:  Carlton Monteiro    :  1960  MRN: [id number]  Restrictions/Precautions:    Medical/Treatment Diagnosis Information:  ·  R shld strain, impingement  ·  R shld pain, stiffness  Physician Information:   Dr. 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 Low Trap raise 20x OVL on wall w/ER 15x GVL on Wall w/ER 20x GVL on Wall w/ER x25                                Scap.                                    ABd.               punch  OVL 15x5\" GVL 20x5\" GVL 20x5\"          Body/Cardio Blade Flex/Ext                                       IR/ER                                       ABd/ADd              Push-up      Plus                              Wall                            Table                           Floor              Ball on Wall  Towel lift off R/L 20x ea                 Tramp              Theraband    Rows/Ext                             IR                             ER                             No money   GVL 15x3\" GVL 20x3\"                         Horiz.  ABd                             Biceps                             Triceps

## 2019-07-17 NOTE — FLOWSHEET NOTE
[facility], [address] [place]  Phone: [phone]  Fax [phone]      Physical Therapy Daily Treatment Note  Date:  2019    Patient Name:  Anna Dowell    :  1960  MRN: [id number]  Restrictions/Precautions:    Medical/Treatment Diagnosis Information:  · Diagnosis: F56.629F (ICD-10-CM) - Strain of right shoulder, initial encounter; M75.41 (ICD-10-CM) - Impingement syndrome of right shoulder  · Treatment Diagnosis: M25.511 Pain in right shoulder; M25.611 Stiffness in right shoulder  Insurance/Certification information:  PT Insurance Information: [facility]  Physician Information:  Referring Practitioner: Renea Newton MD  Plan of care signed (Y/N):     Date of Patient follow up with Physician:19     G-Code (if applicable):      Date G-Code Applied: 19   PT G-Codes  Functional Assessment Tool Used: Quick DASH  Score: 25  Functional Limitation: Mobility: Walking and moving around  Mobility: Walking and Moving Around Current Status (): At least 20 percent but less than 40 percent impaired, limited or restricted  Mobility: Walking and Moving Around Goal Status (): At least 1 percent but less than 20 percent impaired, limited or restricted    Progress Note: [x]  Yes  []  No  Next due by: Visit #10      Latex Allergy:  [x]NO      []YES  Preferred Language for Healthcare:   [x]English       []other:    Visit # Insurance Allowable Requires auth   -D/C auth 2x/week x 5 weeks   -  (22 visits total)    []no        [x]yes:     Pain level:  0-2/10      SUBJECTIVE:  Pt sees MD in 2 weeks for f/u. Today is LV.    OBJECTIVE:   See D/C note   Observation:    TTP suprapin and RC tendon, thoracic PSs and less today at biceps tendon short head; improved at upper trap  Test measurements:  F      RESTRICTIONS/PRECAUTIONS: Hx of cervical fusion (levels unknown), partial R supraspinatus tear, degenerative labral fraying, mod-severe AC arthritis      Exercises/Interventions:   Therapeutic Ex Sets/rep comments HEP   Pt ed: need to reduce forward shoulder position      Supine DNF iso with lift      Pulleys flexion, abduction      Supine pec stretch ~75 deg ABD  pec stretch in samm stretch   on 1/2 FR  End of session X   Supine cane stretch flexion  Cane ER stretch   X   Serratus reach on CC   2x20  15#  X    B ER c flexion-standing OVL full ROM    S/l ER 4#  X   SL abd long-arm  SL flexion long-arm 5#    Post capsule stretch- cross arm  Sleeper stretch  Shoulder IR S lat and sup   15\"5 ea cues for gently stretch X   B shoulder retraction at wall  cues for cervical pos X   TT row  Lat pull down  horiz abduction   TT ER, IR isometric walkouts R,L         Red TT, blue for     Green  green X  with AT   B UE ER - back to wall 3\"x20 GVL With AT   Prone sh ext R,L ea  \" row  \" MT  Low trap      Over SWB 5#  5#  4#  0#    Wall push up plus  With AT    ER wall walk lateral  \" flex- facing wall   10x OVL at wrists With AT   Low trap tug  Single arm row 2x15  2x15 2 Blue ttubes                                                          Manual Intervention      GHJ distractiono/oscillation, post GHJ, inf GHJ mobilizations GIII  Followed by PROM flex, abd, IR, ER 15\"x4 ea  15\"X5 ea  x5 ea     Flexion and ER improved without pain     Inf and post SCJ mobilization GIII      scap mobs- all  STM prince-scap     MWM flexion  Post clavicular glide    DTM UT supraspinatus,  XFM  4'     PA mobs t-spine GIII T10-T4            NMR re-education      BB bicep, tricep, IR/ER in neutral,   Flex at 90 2x30\" ea     Ball tap on wall: flex and scap x30 ea 2#    D1 ext 2x15 Blue tband                                      Therapeutic Exercise and NMR EXR  [x] (82860) Provided verbal/tactile cueing for activities related to strengthening, flexibility, endurance, ROM  for improvements in scapular, scapulothoracic and UE control with self care,

## 2019-08-27 ENCOUNTER — OFFICE VISIT (OUTPATIENT)
Dept: ORTHOPEDIC SURGERY | Age: 59
End: 2019-08-27
Payer: COMMERCIAL

## 2019-08-27 ENCOUNTER — TELEPHONE (OUTPATIENT)
Dept: ORTHOPEDIC SURGERY | Age: 59
End: 2019-08-27

## 2019-08-27 VITALS — HEIGHT: 72 IN | BODY MASS INDEX: 33.86 KG/M2 | WEIGHT: 250 LBS

## 2019-08-27 DIAGNOSIS — M75.41 IMPINGEMENT SYNDROME OF RIGHT SHOULDER: Primary | ICD-10-CM

## 2019-08-27 PROCEDURE — 99213 OFFICE O/P EST LOW 20 MIN: CPT | Performed by: ORTHOPAEDIC SURGERY

## 2019-10-31 RX ORDER — AMOXICILLIN 500 MG/1
TABLET, FILM COATED ORAL
Qty: 4 TABLET | Refills: 3 | Status: SHIPPED | OUTPATIENT
Start: 2019-10-31 | End: 2019-12-18 | Stop reason: SDUPTHER

## 2019-12-18 ENCOUNTER — OFFICE VISIT (OUTPATIENT)
Dept: ORTHOPEDIC SURGERY | Age: 59
End: 2019-12-18

## 2019-12-18 VITALS — BODY MASS INDEX: 33.86 KG/M2 | HEIGHT: 72 IN | WEIGHT: 250 LBS

## 2019-12-18 DIAGNOSIS — M25.562 LEFT KNEE PAIN, UNSPECIFIED CHRONICITY: Primary | ICD-10-CM

## 2019-12-18 PROCEDURE — 99213 OFFICE O/P EST LOW 20 MIN: CPT | Performed by: ORTHOPAEDIC SURGERY

## 2019-12-18 PROCEDURE — L1812 KO ELASTIC W/JOINTS PRE OTS: HCPCS | Performed by: ORTHOPAEDIC SURGERY

## 2019-12-18 RX ORDER — AMOXICILLIN 500 MG/1
TABLET, FILM COATED ORAL
Qty: 4 TABLET | Refills: 3 | Status: SHIPPED | OUTPATIENT
Start: 2019-12-18

## 2019-12-18 RX ORDER — MELOXICAM 15 MG/1
15 TABLET ORAL DAILY
Qty: 30 TABLET | Refills: 1 | Status: SHIPPED | OUTPATIENT
Start: 2019-12-18

## 2020-01-10 ENCOUNTER — HOSPITAL ENCOUNTER (OUTPATIENT)
Dept: CT IMAGING | Age: 60
Discharge: HOME | End: 2020-01-10
Payer: COMMERCIAL

## 2020-01-10 PROCEDURE — 74176 CT ABD & PELVIS W/O CONTRAST: CPT

## 2020-02-18 ENCOUNTER — OFFICE VISIT (OUTPATIENT)
Dept: ORTHOPEDIC SURGERY | Age: 60
End: 2020-02-18
Payer: COMMERCIAL

## 2020-02-18 VITALS — BODY MASS INDEX: 33.86 KG/M2 | HEIGHT: 72 IN | WEIGHT: 250 LBS

## 2020-02-18 PROBLEM — M75.41 IMPINGEMENT SYNDROME OF RIGHT SHOULDER: Status: ACTIVE | Noted: 2020-02-18

## 2020-02-18 PROCEDURE — 99213 OFFICE O/P EST LOW 20 MIN: CPT | Performed by: ORTHOPAEDIC SURGERY

## 2020-02-18 NOTE — PROGRESS NOTES
present plan of care. I encouraged him to call us back as needed. Follow-Up Visit: As needed            [address] [facility]      This dictation was performed with a verbal recognition program (DRAGON) and it was checked for errors. It is possible that there are still dictated errors within this office note. If so, please bring any errors to my attention for an addendum. All efforts were made to ensure that this office note is accurate.

## 2020-06-17 ENCOUNTER — VIRTUAL VISIT (OUTPATIENT)
Dept: ORTHOPEDIC SURGERY | Age: 60
End: 2020-06-17
Payer: COMMERCIAL

## 2020-06-17 PROCEDURE — 99213 OFFICE O/P EST LOW 20 MIN: CPT | Performed by: ORTHOPAEDIC SURGERY

## 2020-07-07 ENCOUNTER — HOSPITAL ENCOUNTER (OUTPATIENT)
Dept: PHYSICAL THERAPY | Age: 60
Setting detail: THERAPIES SERIES
Discharge: HOME | End: 2020-07-07
Payer: COMMERCIAL

## 2020-07-07 PROCEDURE — 97112 NEUROMUSCULAR REEDUCATION: CPT

## 2020-07-07 PROCEDURE — 97161 PT EVAL LOW COMPLEX 20 MIN: CPT

## 2020-07-07 PROCEDURE — 97110 THERAPEUTIC EXERCISES: CPT

## 2020-07-07 NOTE — PLAN OF CARE
[facility], [facility], [address] [facility] [address]  Phone: [phone]   Fax:     [phone]       Physical Therapy Certification    Dear Referring Practitioner: Peter Perez MD,    We had the pleasure of evaluating the following patient for physical therapy services at [address] [place] [address]. A summary of our findings can be found in the initial assessment below. This includes our plan of care. If you have any questions or concerns regarding these findings, please do not hesitate to contact me at the office phone number checked above. Thank you for the referral.       Physician Signature:_______________________________Date:__________________  By signing above (or electronic signature), therapists plan is approved by physician      Patient: Aisha Rodríguez   : 1960   MRN: [id number]  Referring Physician: Referring Practitioner: Peter Perez MD      Evaluation Date: 2020      Medical Diagnosis Information:  Diagnosis: F33.271 (ICD-10-CM) - History of total knee arthroplasty, left   Treatment Diagnosis: Left knee pain                                         Insurance information: PT Insurance Information: [facility] approved through 8/10     Precautions/ Contra-indications: None  Latex Allergy:  [x]NO      []YES  Preferred Language for Healthcare:   [x]English       []other:    SUBJECTIVE: Patient stated complaint: Pt states about 3-4 months ago left knee began giving out. Difficulty getting into truck and bending over to pick objects up. Relevant Medical History:None    Pain Scale: Current: 0/10; Max 4/10; Best 0/10    Easing factors: Sitting    Provocative factors: Climbing stairs.       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 Living Status/Prior Level of Function: Independent with ADLs and IADLs. OBJECTIVE:     ROM RIGHT LEFT        Knee Extension -3 -3   Knee Flexion 115 90             Popliteal angle 160 160   Rectus femoris          Strength  RIGHT LEFT        Knee Extension  5   Knee Flexion  5        Ankle DF  5   Ankle PF  5          Reflexes/Sensation:    [x]Dermatomes/Myotomes intact    [x]Reflexes equal and normal bilaterally   []Other:    Joint mobility:    []Normal    [x]Hypo   []Hyper    Palpation: no ttp    Functional Mobility/Transfers: Independent    Posture: WNL    Bandages/Dressings/Incisions: None    Gait: (include devices/WB status) Mild antalgic    Orthopedic Special Tests: None                       [x] Patient history, allergies, meds reviewed. Medical chart reviewed. See intake form. Review Of Systems (ROS):  [x]Performed Review of systems (Integumentary, CardioPulmonary, Neurological) by intake and observation. Intake form has been scanned into medical record. Patient has been instructed to contact their primary care physician regarding ROS issues if not already being addressed at this time.       Co-morbidities/Complexities (which will affect course of rehabilitation):   [x]None           Arthritic conditions   []Rheumatoid arthritis (M05.9)  []Osteoarthritis (M19.91)   Cardiovascular conditions   []Hypertension (I10)  []Hyperlipidemia (E78.5)  []Angina pectoris (I20)  []Atherosclerosis (I70)   Musculoskeletal conditions   []Disc pathology   []Congenital spine pathologies   []Prior surgical intervention  []Osteoporosis (M81.8)  []Osteopenia (M85.8)   Endocrine conditions   []Hypothyroid (E03.9)  []Hyperthyroid Gastrointestinal conditions   []Constipation (R44.21)   Metabolic conditions   []Morbid obesity (E66.01)  []Diabetes type 1(E10.65) or 2 (E11.65)   []Neuropathy (G60.9)     Pulmonary conditions   []Asthma (J45)  []Coughing   []COPD (J44.9)   Psychological Disorders  []Anxiety (F41.9)  []Depression (F32.9)   []Other:   []Other:          Barriers to/and or personal factors that will affect rehab potential:              []Age  []Sex              []Motivation/Lack of Motivation                        []Co-Morbidities              []Cognitive Function, education/learning barriers              []Environmental, home barriers              []profession/work barriers  []past PT/medical experience  []other:  Justification: None    Falls Risk Assessment (30 days):   [x] Falls Risk assessed and no intervention required. [] Falls Risk assessed and Patient requires intervention due to being higher risk   TUG score (>12s at risk):     [] Falls education provided, including           Functional Questionnaire: LEFS 68%        ASSESSMENT: Najma Williamson is a 64 y.o. male reporting to OP PT with c/c of left knee bucking which has been occurring for past 3-4 months which is causing falls. Pt has history of left TKA. Pt does have mild edema of left knee. Strength is WNL. ROM is limited.            Functional Impairments:     [x]Noted lumbar/proximal hip/LE joint hypomobility   [x]Decreased LE functional ROM   [x]Decreased core/proximal hip strength and neuromuscular control   [x]Decreased LE functional strength   [x]Reduced balance/proprioceptive control   []other:      Functional Activity Limitations (from functional questionnaire and intake)   []Reduced ability to tolerate prolonged functional positions   [x]Reduced ability or difficulty with changes of positions or transfers between positions   []Reduced ability to maintain good posture and demonstrate good body mechanics with sitting, bending, and lifting   []Reduced ability to sleep   [] Reduced ability or tolerance with driving and/or computer work   []Reduced ability to perform lifting, carrying tasks   [x]Reduced ability to squat   []Reduced ability to forward bend   [x]Reduced ability to ambulate prolonged functional periods/distances/surfaces   [x]Reduced ability to ascend/descend stairs   []Reduced ability to run, hop, cut or jump   []other:    Participation Restrictions   []Reduced participation in self care activities   [x]Reduced participation in home management activities   [x]Reduced participation in work activities   [x]Reduced participation in social activities. []Reduced participation in sport/recreation activities. Classification :    []Signs/symptoms consistent with post-surgical status including decreased ROM, strength and function.    [x]Signs/symptoms consistent with joint sprain/strain   []Signs/symptoms consistent with patella-femoral syndrome   []Signs/symptoms consistent with knee OA/hip OA   []Signs/symptoms consistent with internal derangement of knee/Hip   []Signs/symptoms consistent with functional hip weakness/NMR control      []Signs/symptoms consistent with tendinitis/tendinosis    []signs/symptoms consistent with pathology which may benefit from Dry needling      []other:      Prognosis/Rehab Potential:      []Excellent   [x]Good    []Fair   []Poor    Tolerance of evaluation/treatment:    []Excellent   [x]Good    []Fair   []Poor    Physical Therapy Evaluation Complexity Justification  [x] A history of present problem with:  [x] no personal factors and/or comorbidities that impact the plan of care;  []1-2 personal factors and/or comorbidities that impact the plan of care  []3 personal factors and/or comorbidities that impact the plan of care  [x] An examination of body systems using standardized tests and measures addressing any of the following: body structures and functions (impairments), activity limitations, and/or participation restrictions;:  [x] a total of 1-2 or more elements   [] a total of 3 or more elements   [] a total of 4 or more elements   [x] A clinical presentation with:  [x] stable and/or uncomplicated characteristics   [] evolving clinical presentation with changing characteristics  [] unstable and unpredictable characteristics;   [x] Clinical decision making of [] low, [] moderate, [] high complexity using standardized patient assessment instrument and/or measurable assessment of functional outcome. [x] EVAL (LOW) 97894 (typically 20 minutes face-to-face)  [] EVAL (MOD) 13628 (typically 30 minutes face-to-face)  [] EVAL (HIGH) 54180 (typically 45 minutes face-to-face)  [] RE-EVAL     PLAN:   Frequency/Duration:  2 days per week for 4 Weeks:  Interventions:  [x]  Therapeutic exercise including: strength training, ROM, for Lower extremity and core   [x]  NMR activation and proprioception for LE, Glutes and Core   [x]  Manual therapy as indicated for LE, Hip and spine to include: Dry Needling/IASTM, STM, PROM, Gr I-IV mobilizations, manipulation. [x] Modalities as needed that may include: thermal agents, E-stim, Biofeedback, US, iontophoresis as indicated  [x] Patient education on joint protection, postural re-education, activity modification, progression of HEP. HEP instruction: (see scanned forms)    GOALS:  Patient stated goal: Pain free    Therapist goals for Patient:   Short Term Goals: To be achieved in: 2 weeks  1. Independent in HEP and progression per patient tolerance, in order to prevent re-injury. [] Progressing: [] Met: [] Not Met: [] Adjusted     2. Patient will have a decrease in pain to facilitate improvement in movement, function, and ADLs as indicated by Functional Deficits. [] Progressing: [] Met: [] Not Met: [] Adjusted     Long Term Goals: To be achieved in: 6 weeks  1. Disability index score of 40% or less for the LEFS to assist with reaching prior level of function. [] Progressing: [] Met: [] Not Met: [] Adjusted     2. Patient will demonstrate increased AROM to -3-105 to allow for proper joint functioning as indicated by patients Functional Deficits. [] Progressing: [] Met: [] Not Met: [] Adjusted     3.  Patient will demonstrate an increase in Strength to good proximal hip strength and control, within 5lb HHD in LE to allow for proper functional mobility as indicated by patients Functional Deficits. [] Progressing: [] Met: [] Not Met: [] Adjusted     4. Patient will return to functional activities without increased symptoms or restriction. [] Progressing: [] Met: [] Not Met: [] Adjusted     5.  Pt will have no reports of leg giving out. (patient specific functional goal)    [] Progressing: [] Met: [] Not Met: [] Adjusted      Electronically signed by:  Joanna Johns PT

## 2020-07-07 NOTE — FLOWSHEET NOTE
Sit<>stands x 10x2                                                                                                         Manual (29095): None    Therapeutic Exercise and NMR EXR  [x] (80474) Provided verbal/tactile cueing for activities related to strengthening, flexibility, endurance, ROM for improvements in LE, proximal hip, and core control with self care, mobility, lifting, ambulation. [x] (62126) Provided verbal/tactile cueing for activities related to improving balance, coordination, kinesthetic sense, posture, motor skill, proprioception to assist with LE, proximal hip, and core control in self-care, mobility, lifting, ambulation and eccentric single leg control.      NMR and Therapeutic Activities:    [x] (53358 or 30434) Provided verbal/tactile cueing for activities related to improving balance, coordination, kinesthetic sense, posture, motor skill, proprioception and motor activation to allow for proper function of core, proximal hip and LE with self-care and ADLs and functional mobility.   [] (93733) Gait Re-education- Provided training and instruction to the patient for proper LE, core and proximal hip recruitment and positioning and eccentric body weight control with ambulation re-education including up and down stairs     Home Exercise Program:    [x] (98503) Reviewed/Progressed HEP activities related to strengthening, flexibility, endurance, ROM of core, proximal hip and LE for functional self-care, mobility, lifting and ambulation/stair navigation   [] (41472) Reviewed/Progressed HEP activities related to improving balance, coordination, kinesthetic sense, posture, motor skill, proprioception of core, proximal hip and LE for self-care, mobility, lifting, and ambulation/stair navigation      Manual Treatments:  PROM / STM / Oscillations-Mobs:  G-I, II, III, IV (PA's, Inf., Post.)  [x] (36804) Provided manual therapy to mobilize LE, proximal hip and/or LS spine soft tissue/joints for the purpose of modulating pain, promoting relaxation, increasing ROM, reducing/eliminating soft tissue swelling/inflammation/restriction, improving soft tissue extensibility and allowing for proper ROM for normal function with self-care, mobility, lifting and ambulation. Modalities:     [x] GAME READY (VASO)- for significant edema, swelling, pain control. Charges:  Timed Code Treatment Minutes: 23   Total Treatment Minutes: 45      [x] EVAL (LOW) 52127 (typically 20 minutes face-to-face)  [] EVAL (MOD) 70603 (typically 30 minutes face-to-face)  [] EVAL (HIGH) 77926 (typically 45 minutes face-to-face)  [] RE-EVAL     [x] VB(31834) x     [] IONTO  [x] NMR (85417) x     [] VASO  [] Manual (31648) x     [] Other:  [] TA x      [] Mech Traction (46627)  [] ES(attended) (45579)      [] ES (un) (63879):    GOALS:      Patient stated goal: Pain free    Therapist goals for Patient:   Short Term Goals: To be achieved in: 2 weeks  1. Independent in HEP and progression per patient tolerance, in order to prevent re-injury. [] Progressing: [] Met: [] Not Met: [] Adjusted     2. Patient will have a decrease in pain to facilitate improvement in movement, function, and ADLs as indicated by Functional Deficits. [] Progressing: [] Met: [] Not Met: [] Adjusted     Long Term Goals: To be achieved in: 6 weeks  1. Disability index score of 40% or less for the LEFS to assist with reaching prior level of function. [] Progressing: [] Met: [] Not Met: [] Adjusted     2. Patient will demonstrate increased AROM to -3-105 to allow for proper joint functioning as indicated by patients Functional Deficits. [] Progressing: [] Met: [] Not Met: [] Adjusted     3. Patient will demonstrate an increase in Strength to good proximal hip strength and control, within 5lb HHD in LE to allow for proper functional mobility as indicated by patients Functional Deficits. [] Progressing: [] Met: [] Not Met: [] Adjusted     4.  Patient will return to functional activities without increased symptoms or restriction. [] Progressing: [] Met: [] Not Met: [] Adjusted     5. Pt will have no reports of leg giving out. (patient specific functional goal)    [] Progressing: [] Met: [] Not Met: [] Adjusted     ASSESSMENT:  See thomas    Patient received education on their current pathology and how their condition effects them with their functional activities. Patient understood discussion and questions were answered. Patient understands their activity limitations and understands rational for treatment progression. Pt educated on plan of care and HEP, if worsening symptoms to d/c that exercise. Overall Progression Towards Functional goals/ Treatment Progress Update:  [] Patient is progressing as expected towards functional goals listed. [] Progression is slowed due to complexities/Impairments listed. [] Progression has been slowed due to co-morbidities.   [x] Plan just implemented, too soon to assess goals progression <30days   [] Goals require adjustment due to lack of progress  [] Patient is not progressing as expected and requires additional follow up with physician  [] Other    Prognosis for POC: [x] Good [] Fair  [] Poor      Patient requires continued skilled intervention: [x] Yes  [] No    Treatment/Activity Tolerance:  [x] Patient able to complete treatment  [] Patient limited by fatigue  [] Patient limited by pain    [] Patient limited by other medical complications  [] Other:         Return to Play: (if applicable)   []  Stage 1: Intro to Strength   []  Stage 2: Return to Run and Strength   []  Stage 3: Return to Jump and Strength   []  Stage 4: Dynamic Strength and Agility   []  Stage 5: Sport Specific Training     []  Ready to Return to Play, Meets All Above Stages   []  Not Ready for Return to Sports   Comments:                               PLAN: See thomas  [] Continue per plan of care [] Alter current plan (see comments above)  [x] Plan of care initiated [] Hold pending MD visit [] Discharge      Electronically signed by:  Angy Lopez PT    Note: If patient does not return for scheduled/ recommended follow up visits, this note will serve as a discharge from care along with most recent update on progress.

## 2020-07-13 ENCOUNTER — HOSPITAL ENCOUNTER (OUTPATIENT)
Dept: PHYSICAL THERAPY | Age: 60
Setting detail: THERAPIES SERIES
Discharge: HOME | End: 2020-07-13
Payer: COMMERCIAL

## 2020-07-13 PROCEDURE — 97110 THERAPEUTIC EXERCISES: CPT

## 2020-07-13 PROCEDURE — 97112 NEUROMUSCULAR REEDUCATION: CPT

## 2020-07-13 NOTE — FLOWSHEET NOTE
[facility], [address] [place] [address] [address]  Phone: [phone]   Fax:     [phone]      Physical Therapy Treatment Note/ Progress Report:           Date:  2020    Patient Name:  Jeyson Garcia    :  1960  MRN: [id number]  Restrictions/Precautions:    Medical/Treatment Diagnosis Information:  · Diagnosis: F76.817 (ICD-10-CM) - History of total knee arthroplasty, left  · Treatment Diagnosis: Left knee pain  Insurance/Certification information:  PT Insurance Information: [facility] approved through 8/10  Physician Information:  Referring Practitioner: Roula Mckeon MD  Has the plan of care been signed (Y/N):        []  Yes  []  No     Date of Patient follow up with Physician: TBD    Assessment Summary:  Jaz Flores is a 64 y.o. male reporting to OP PT with c/c of left knee bucking which has been occurring for past 3-4 months which is causing falls. Pt has history of left TKA. Pt does have mild edema of left knee. Strength is WNL. ROM is limited. Is this a Progress Report:     []  Yes  [x]  No        If Yes:  Date Range for reporting period:  Beginning 20  Ending     Progress report will be due (10 Rx or 30 days whichever is less):        Recertification will be due (POC Duration  / 90 days whichever is less): 20         Visit # Insurance Allowable Auth Required   2 - through 8/10/20 []  Yes [x]  No        Functional Scale: LEFS 68%    Date assessed:        Latex Allergy:  [x]NO      []YES  Preferred Language for Healthcare:   [x]English       []other:      Pain level:  3/10     SUBJECTIVE:  Pt states knee is a little sore.      OBJECTIVE: see eval      RESTRICTIONS/PRECAUTIONS: None    Exercises/Interventions:   Therapeutic Ex (84000) HEP 20    Warm-up       Rec bike   5'           TABLE       QS x 10x2     SLR x 10x2     Bridge x 10x2                   SEATED       LAQ x 10x2 STANDING       Sit<>stands x 10x2     CORY TKE   10x3, 75#    Knee extension machine   10x3, 20#    HS curl machine   10x3, 40#    Anterior step ups   X30, 4\"    Lateral step ups   X30, 4\"    BOSU balance   30\"x3                                                              Manual (13184): None    Therapeutic Exercise and NMR EXR  [x] (42789) Provided verbal/tactile cueing for activities related to strengthening, flexibility, endurance, ROM for improvements in LE, proximal hip, and core control with self care, mobility, lifting, ambulation. [x] (31032) Provided verbal/tactile cueing for activities related to improving balance, coordination, kinesthetic sense, posture, motor skill, proprioception to assist with LE, proximal hip, and core control in self-care, mobility, lifting, ambulation and eccentric single leg control.      NMR and Therapeutic Activities:    [x] (00594 or 75790) Provided verbal/tactile cueing for activities related to improving balance, coordination, kinesthetic sense, posture, motor skill, proprioception and motor activation to allow for proper function of core, proximal hip and LE with self-care and ADLs and functional mobility.   [] (74566) Gait Re-education- Provided training and instruction to the patient for proper LE, core and proximal hip recruitment and positioning and eccentric body weight control with ambulation re-education including up and down stairs     Home Exercise Program:    [x] (98067) Reviewed/Progressed HEP activities related to strengthening, flexibility, endurance, ROM of core, proximal hip and LE for functional self-care, mobility, lifting and ambulation/stair navigation   [] (37133) Reviewed/Progressed HEP activities related to improving balance, coordination, kinesthetic sense, posture, motor skill, proprioception of core, proximal hip and LE for self-care, mobility, lifting, and ambulation/stair navigation      Manual Treatments:  PROM / STM / Oscillations-Mobs:  G-I, II, III, IV (PA's, Inf., Post.)  [x] (27459) Provided manual therapy to mobilize LE, proximal hip and/or LS spine soft tissue/joints for the purpose of modulating pain, promoting relaxation, increasing ROM, reducing/eliminating soft tissue swelling/inflammation/restriction, improving soft tissue extensibility and allowing for proper ROM for normal function with self-care, mobility, lifting and ambulation. Modalities:     [x] GAME READY (VASO)- for significant edema, swelling, pain control. Charges:  Timed Code Treatment Minutes: 39   Total Treatment Minutes: 39      [] EVAL (LOW) 53409 (typically 20 minutes face-to-face)  [] EVAL (MOD) 51778 (typically 30 minutes face-to-face)  [] EVAL (HIGH) 50430 (typically 45 minutes face-to-face)  [] RE-EVAL     [x] CS(27330) 2     [] IONTO  [x] NMR (19476) 1     [] VASO  [] Manual (77409) x     [] Other:  [] TA x      [] Mech Traction (08233)  [] ES(attended) (50926)      [] ES (un) (78788):    GOALS:      Patient stated goal: Pain free    Therapist goals for Patient:   Short Term Goals: To be achieved in: 2 weeks  1. Independent in HEP and progression per patient tolerance, in order to prevent re-injury. [] Progressing: [] Met: [] Not Met: [] Adjusted     2. Patient will have a decrease in pain to facilitate improvement in movement, function, and ADLs as indicated by Functional Deficits. [] Progressing: [] Met: [] Not Met: [] Adjusted     Long Term Goals: To be achieved in: 6 weeks  1. Disability index score of 40% or less for the LEFS to assist with reaching prior level of function. [] Progressing: [] Met: [] Not Met: [] Adjusted     2. Patient will demonstrate increased AROM to -3-105 to allow for proper joint functioning as indicated by patients Functional Deficits. [] Progressing: [] Met: [] Not Met: [] Adjusted     3.  Patient will demonstrate an increase in Strength to good proximal hip strength and control, within 5lb HHD in LE to allow for proper functional mobility as indicated by patients Functional Deficits. [] Progressing: [] Met: [] Not Met: [] Adjusted     4. Patient will return to functional activities without increased symptoms or restriction. [] Progressing: [] Met: [] Not Met: [] Adjusted     5. Pt will have no reports of leg giving out. (patient specific functional goal)    [] Progressing: [] Met: [] Not Met: [] Adjusted     ASSESSMENT:  Pt davide session well. Knee ROm remains limited as could not perform full revolutions on rec bike. Overall Progression Towards Functional goals/ Treatment Progress Update:  [] Patient is progressing as expected towards functional goals listed. [] Progression is slowed due to complexities/Impairments listed. [] Progression has been slowed due to co-morbidities. [x] Plan just implemented, too soon to assess goals progression <30days   [] Goals require adjustment due to lack of progress  [] Patient is not progressing as expected and requires additional follow up with physician  [] Other    Prognosis for POC: [x] Good [] Fair  [] Poor      Patient requires continued skilled intervention: [x] Yes  [] No    Treatment/Activity Tolerance:  [x] Patient able to complete treatment  [] Patient limited by fatigue  [] Patient limited by pain    [] Patient limited by other medical complications  [] Other:           PLAN: See eval  [x] Continue per plan of care [] Alter current plan (see comments above)  [] Plan of care initiated [] Hold pending MD visit [] Discharge      Electronically signed by:  Jessica Das PT    Note: If patient does not return for scheduled/ recommended follow up visits, this note will serve as a discharge from care along with most recent update on progress.

## 2020-07-16 ENCOUNTER — HOSPITAL ENCOUNTER (OUTPATIENT)
Dept: PHYSICAL THERAPY | Age: 60
Setting detail: THERAPIES SERIES
Discharge: HOME | End: 2020-07-16
Payer: COMMERCIAL

## 2020-07-16 PROCEDURE — 97112 NEUROMUSCULAR REEDUCATION: CPT

## 2020-07-16 PROCEDURE — 97110 THERAPEUTIC EXERCISES: CPT

## 2020-07-16 NOTE — FLOWSHEET NOTE
[address] [place] [address] and Sports Rehabilitation[address], [address] [place] [address] [address]  Phone: [phone]   Fax:     [phone]      Physical Therapy Treatment Note/ Progress Report:           Date:  2020    Patient Name:  Aisha Rodríguez    :  1960  MRN: [id number]  Restrictions/Precautions:    Medical/Treatment Diagnosis Information:  · Diagnosis: A27.918 (ICD-10-CM) - History of total knee arthroplasty, left  · Treatment Diagnosis: Left knee pain  Insurance/Certification information:  PT Insurance Information: [facility] approved through 8/10  Physician Information:  Referring Practitioner: Peter Perez MD  Has the plan of care been signed (Y/N):        []  Yes  []  No     Date of Patient follow up with Physician: TBDELON    Assessment Summary:  Ely Parker is a 64 y.o. male reporting to OP PT with c/c of left knee bucking which has been occurring for past 3-4 months which is causing falls. Pt has history of left TKA. Pt does have mild edema of left knee. Strength is WNL. ROM is limited. Is this a Progress Report:     []  Yes  [x]  No        If Yes:  Date Range for reporting period:  Beginning 20  Ending     Progress report will be due (10 Rx or 30 days whichever is less):        Recertification will be due (POC Duration  / 90 days whichever is less): 20         Visit # Insurance Allowable Auth Required   3 8- through 8/10/20 []  Yes [x]  No        Functional Scale: LEFS 68%    Date assessed:        Latex Allergy:  [x]NO      []YES  Preferred Language for Healthcare:   [x]English       []other:      Pain level:  10     SUBJECTIVE:  Pt states knee is a little sore.      OBJECTIVE: see eval      RESTRICTIONS/PRECAUTIONS: None    Exercises/Interventions:   Therapeutic Ex (40900) HEP 20    Warm-up        Rec bike   5' 5'            TABLE        QS x 10x2  --    SLR x 10x2  --    Bridge x 10x2  -- STANDING        Wedge gastroc stretch    1'    HS stretch    1'    Sit<>stands x 10x2  --    CORY TKE   10x3, 75# --    Knee extension machine   10x3, 20# 10x3,     HS curl machine   10x3, 40# 10x3, 45#    Anterior step ups   X30, 4\" X30, 4\"    Lateral step ups   X30, 4\" X30, 4\"    BOSU balance   30\"x3 30\"x3    BOSU lunges    x15 B    Leg Press DL    10x3, 120#    Eccentric leg press    X10, 100#    Retrowalk CC     next                                             Manual (35248): None    Therapeutic Exercise and NMR EXR  [x] (35237) Provided verbal/tactile cueing for activities related to strengthening, flexibility, endurance, ROM for improvements in LE, proximal hip, and core control with self care, mobility, lifting, ambulation. [x] (36688) Provided verbal/tactile cueing for activities related to improving balance, coordination, kinesthetic sense, posture, motor skill, proprioception to assist with LE, proximal hip, and core control in self-care, mobility, lifting, ambulation and eccentric single leg control.      NMR and Therapeutic Activities:    [x] (11573 or 05286) Provided verbal/tactile cueing for activities related to improving balance, coordination, kinesthetic sense, posture, motor skill, proprioception and motor activation to allow for proper function of core, proximal hip and LE with self-care and ADLs and functional mobility.   [] (70458) Gait Re-education- Provided training and instruction to the patient for proper LE, core and proximal hip recruitment and positioning and eccentric body weight control with ambulation re-education including up and down stairs     Home Exercise Program:    [x] (08157) Reviewed/Progressed HEP activities related to strengthening, flexibility, endurance, ROM of core, proximal hip and LE for functional self-care, mobility, lifting and ambulation/stair navigation   [] (39920) Reviewed/Progressed HEP activities related to improving balance, coordination, kinesthetic sense, posture, motor skill, proprioception of core, proximal hip and LE for self-care, mobility, lifting, and ambulation/stair navigation      Manual Treatments:  PROM / STM / Oscillations-Mobs:  G-I, II, III, IV (PA's, Inf., Post.)  [x] (49364) Provided manual therapy to mobilize LE, proximal hip and/or LS spine soft tissue/joints for the purpose of modulating pain, promoting relaxation, increasing ROM, reducing/eliminating soft tissue swelling/inflammation/restriction, improving soft tissue extensibility and allowing for proper ROM for normal function with self-care, mobility, lifting and ambulation. Modalities:     [x] GAME READY (VASO)- for significant edema, swelling, pain control. Charges:  Timed Code Treatment Minutes: 40   Total Treatment Minutes: 40      [] EVAL (LOW) 44373 (typically 20 minutes face-to-face)  [] EVAL (MOD) 34715 (typically 30 minutes face-to-face)  [] EVAL (HIGH) 77204 (typically 45 minutes face-to-face)  [] RE-EVAL     [x] VV(35337) 2     [] IONTO  [x] NMR (72666) 1     [] VASO  [] Manual (11127) x     [] Other:  [] TA x      [] Mech Traction (94251)  [] ES(attended) (78473)      [] ES (un) (13111):    GOALS:      Patient stated goal: Pain free    Therapist goals for Patient:   Short Term Goals: To be achieved in: 2 weeks  1. Independent in HEP and progression per patient tolerance, in order to prevent re-injury. [] Progressing: [] Met: [] Not Met: [] Adjusted     2. Patient will have a decrease in pain to facilitate improvement in movement, function, and ADLs as indicated by Functional Deficits. [] Progressing: [] Met: [] Not Met: [] Adjusted     Long Term Goals: To be achieved in: 6 weeks  1. Disability index score of 40% or less for the LEFS to assist with reaching prior level of function. [] Progressing: [] Met: [] Not Met: [] Adjusted     2. Patient will demonstrate increased AROM to -3-105 to allow for proper joint functioning as indicated by patients Functional Deficits. [] Progressing: [] Met: [] Not Met: [] Adjusted     3. Patient will demonstrate an increase in Strength to good proximal hip strength and control, within 5lb HHD in LE to allow for proper functional mobility as indicated by patients Functional Deficits. [] Progressing: [] Met: [] Not Met: [] Adjusted     4. Patient will return to functional activities without increased symptoms or restriction. [] Progressing: [] Met: [] Not Met: [] Adjusted     5. Pt will have no reports of leg giving out. (patient specific functional goal)    [] Progressing: [] Met: [] Not Met: [] Adjusted     ASSESSMENT:  Pt davide session well. Knee ROm remains limited as could not perform full revolutions on rec bike. Cues needed for eccentric control and sequencing. Overall Progression Towards Functional goals/ Treatment Progress Update:  [] Patient is progressing as expected towards functional goals listed. [] Progression is slowed due to complexities/Impairments listed. [] Progression has been slowed due to co-morbidities. [x] Plan just implemented, too soon to assess goals progression <30days   [] Goals require adjustment due to lack of progress  [] Patient is not progressing as expected and requires additional follow up with physician  [] Other    Prognosis for POC: [x] Good [] Fair  [] Poor      Patient requires continued skilled intervention: [x] Yes  [] No    Treatment/Activity Tolerance:  [x] Patient able to complete treatment  [] Patient limited by fatigue  [] Patient limited by pain    [] Patient limited by other medical complications  [] Other:           PLAN: See eval  [x] Continue per plan of care [] Alter current plan (see comments above)  [] Plan of care initiated [] Hold pending MD visit [] Discharge      Electronically signed by:  Joanna Johns PT    Note: If patient does not return for scheduled/ recommended follow up visits, this note will serve as a discharge from care along with most recent update on progress.

## 2020-07-21 ENCOUNTER — HOSPITAL ENCOUNTER (OUTPATIENT)
Dept: PHYSICAL THERAPY | Age: 60
Setting detail: THERAPIES SERIES
Discharge: HOME | End: 2020-07-21
Payer: COMMERCIAL

## 2020-07-21 PROCEDURE — 97112 NEUROMUSCULAR REEDUCATION: CPT

## 2020-07-21 PROCEDURE — 97110 THERAPEUTIC EXERCISES: CPT

## 2020-07-21 PROCEDURE — 97530 THERAPEUTIC ACTIVITIES: CPT

## 2020-07-21 NOTE — FLOWSHEET NOTE
--                         STANDING       Wedge gastroc stretch   1' 1'   HS stretch   1' 1'   Heel raises    10x3   Sit<>stands x  -- 10x2   CORY TKE  10x3, 75# -- 10x2, 75#   CORY abd/ext    10x2 ea B, 60#   Knee extension machine  10x3, 20# 10x3,  10x3, 45#   HS curl machine  10x3, 40# 10x3, 45# 10x3, 45#   Anterior step ups  X30, 4\" X30, 4\" X30, 6\"   Lateral step ups  X30, 4\" X30, 4\" X30, 6\"   BOSU balance  30\"x3 30\"x3 30\"x2   BOSU lunges   x15 B x20 B   Leg Press DL   10x3, 120# 10x2, 120#   Eccentric leg press   X10, 100# 10x2, 120#   Retrowalk CC    X10, 45#                                        Manual (35457): None    Therapeutic Exercise and NMR EXR  [x] (13601) Provided verbal/tactile cueing for activities related to strengthening, flexibility, endurance, ROM for improvements in LE, proximal hip, and core control with self care, mobility, lifting, ambulation. [x] (39887) Provided verbal/tactile cueing for activities related to improving balance, coordination, kinesthetic sense, posture, motor skill, proprioception to assist with LE, proximal hip, and core control in self-care, mobility, lifting, ambulation and eccentric single leg control.      NMR and Therapeutic Activities:    [x] (69717 or 38825) Provided verbal/tactile cueing for activities related to improving balance, coordination, kinesthetic sense, posture, motor skill, proprioception and motor activation to allow for proper function of core, proximal hip and LE with self-care and ADLs and functional mobility.   [] (02374) Gait Re-education- Provided training and instruction to the patient for proper LE, core and proximal hip recruitment and positioning and eccentric body weight control with ambulation re-education including up and down stairs     Home Exercise Program:    [x] (18199) Reviewed/Progressed HEP activities related to strengthening, flexibility, endurance, ROM of core, proximal hip and LE for functional self-care, mobility, lifting and ambulation/stair navigation   [] (13751) Reviewed/Progressed HEP activities related to improving balance, coordination, kinesthetic sense, posture, motor skill, proprioception of core, proximal hip and LE for self-care, mobility, lifting, and ambulation/stair navigation      Manual Treatments:  PROM / STM / Oscillations-Mobs:  G-I, II, III, IV (PA's, Inf., Post.)  [x] (56086) Provided manual therapy to mobilize LE, proximal hip and/or LS spine soft tissue/joints for the purpose of modulating pain, promoting relaxation, increasing ROM, reducing/eliminating soft tissue swelling/inflammation/restriction, improving soft tissue extensibility and allowing for proper ROM for normal function with self-care, mobility, lifting and ambulation. Modalities:     [x] GAME READY (VASO)- for significant edema, swelling, pain control. Charges:  Timed Code Treatment Minutes: 53   Total Treatment Minutes: 53      [] EVAL (LOW) 11139 (typically 20 minutes face-to-face)  [] EVAL (MOD) 35878 (typically 30 minutes face-to-face)  [] EVAL (HIGH) 31951 (typically 45 minutes face-to-face)  [] RE-EVAL     [x] OA(15177) 2     [] IONTO  [x] NMR (77514) 1     [] VASO  [] Manual (42427) x     [] Other:  [x] TA 1      [] Mech Traction (41844)  [] ES(attended) (39887)      [] ES (un) (10938):    GOALS:      Patient stated goal: Pain free    Therapist goals for Patient:   Short Term Goals: To be achieved in: 2 weeks  1. Independent in HEP and progression per patient tolerance, in order to prevent re-injury. [] Progressing: [] Met: [] Not Met: [] Adjusted     2. Patient will have a decrease in pain to facilitate improvement in movement, function, and ADLs as indicated by Functional Deficits. [] Progressing: [] Met: [] Not Met: [] Adjusted     Long Term Goals: To be achieved in: 6 weeks  1. Disability index score of 40% or less for the LEFS to assist with reaching prior level of function.    [] Progressing: [] Met: [] Not Met: [] Adjusted as a discharge from care along with most recent update on progress.

## 2020-07-23 ENCOUNTER — HOSPITAL ENCOUNTER (OUTPATIENT)
Dept: PHYSICAL THERAPY | Age: 60
Setting detail: THERAPIES SERIES
Discharge: HOME | End: 2020-07-23
Payer: COMMERCIAL

## 2020-07-23 PROCEDURE — 97530 THERAPEUTIC ACTIVITIES: CPT

## 2020-07-23 PROCEDURE — 97112 NEUROMUSCULAR REEDUCATION: CPT

## 2020-07-23 PROCEDURE — 97016 VASOPNEUMATIC DEVICE THERAPY: CPT

## 2020-07-23 PROCEDURE — 97110 THERAPEUTIC EXERCISES: CPT

## 2020-07-28 ENCOUNTER — HOSPITAL ENCOUNTER (OUTPATIENT)
Dept: PHYSICAL THERAPY | Age: 60
Setting detail: THERAPIES SERIES
Discharge: HOME | End: 2020-07-28
Payer: COMMERCIAL

## 2020-07-28 PROCEDURE — 97112 NEUROMUSCULAR REEDUCATION: CPT

## 2020-07-28 PROCEDURE — 97110 THERAPEUTIC EXERCISES: CPT

## 2020-07-28 PROCEDURE — 97530 THERAPEUTIC ACTIVITIES: CPT

## 2020-07-28 NOTE — FLOWSHEET NOTE
[address] [place] [address] and Sports Rehabilitation[address], [address] [place] [address] [address]  Phone: [phone]   Fax:     [phone]      Physical Therapy Treatment Note/ Progress Report:           Date:  2020    Patient Name:  Jey Madrid    :  1960  MRN: [id number]  Restrictions/Precautions:    Medical/Treatment Diagnosis Information:  · Diagnosis: G37.603 (ICD-10-CM) - History of total knee arthroplasty, left  · Treatment Diagnosis: Left knee pain  Insurance/Certification information:  PT Insurance Information: [facility] approved through 8/10  Physician Information:  Referring Practitioner: Richmond Mann MD  Has the plan of care been signed (Y/N):        []  Yes  []  No     Date of Patient follow up with Physician: TBDELON    Assessment Summary:  Anuradha Tirado is a 64 y.o. male reporting to OP PT with c/c of left knee bucking which has been occurring for past 3-4 months which is causing falls. Pt has history of left TKA. Pt does have mild edema of left knee. Strength is WNL. ROM is limited. Is this a Progress Report:     []  Yes  [x]  No        If Yes:  Date Range for reporting period:  Beginning 20  Ending     Progress report will be due (10 Rx or 30 days whichever is less):        Recertification will be due (POC Duration  / 90 days whichever is less): 20         Visit # Insurance Allowable Auth Required   - through 8/10/20 []  Yes [x]  No        Functional Scale: LEFS 68%    Date assessed:        Latex Allergy:  [x]NO      []YES  Preferred Language for Healthcare:   [x]English       []other:      Pain level:  1/10     SUBJECTIVE:  Pt states mild knee soreness. Overall feels like it is getting better.      OBJECTIVE:       RESTRICTIONS/PRECAUTIONS: None    Exercises/Interventions:   Therapeutic Ex (18894) HEP 20   Warm-up       Rec bike  6' 6' 6'          TABLE       QS x   --   SLR x   --   Bridge x  10x2 --   Heel slides   10x2 --                 STANDING       Wedge gastroc stretch  1' 1' 1'   HS stretch  1' 1' 1'   Heel raises  10x3 -- --   Sit<>stands x 10x2 -- --   CORY TKE  10x2, 75# -- --   CORY abd/ext  10x2 ea B, 60# 10x2 ea B 60# 10x2 ea, 60#   Knee extension machine  10x3, 45# 10x3, 50# 10x3, 50#   HS curl machine  10x3, 45# 10x3, 50# 10x3, 50#   Anterior step ups  X30, 6\" X20, 6\" --   Lateral step ups  X30, 6\" X20, 6\" --   BOSU balance  30\"x2 30\"x3 30\"x2   BOSU squats    x10   BOSU lunges ant/lat  x20 B x15 ea B --   Leg Press DL  10x2, 120# 10x2, 120# 10x2, 125#   Eccentric leg press  10x2, 120# 10x2, 120# 10x2, 125#   Retrowalk CC  X10, 45# X10, 45# X10, 45#   SL RDL    10x2 B                                 Manual (33504): None    Therapeutic Exercise and NMR EXR  [x] (99706) Provided verbal/tactile cueing for activities related to strengthening, flexibility, endurance, ROM for improvements in LE, proximal hip, and core control with self care, mobility, lifting, ambulation. [x] (87163) Provided verbal/tactile cueing for activities related to improving balance, coordination, kinesthetic sense, posture, motor skill, proprioception to assist with LE, proximal hip, and core control in self-care, mobility, lifting, ambulation and eccentric single leg control.      NMR and Therapeutic Activities:    [x] (75711 or 09394) Provided verbal/tactile cueing for activities related to improving balance, coordination, kinesthetic sense, posture, motor skill, proprioception and motor activation to allow for proper function of core, proximal hip and LE with self-care and ADLs and functional mobility.   [] (02478) Gait Re-education- Provided training and instruction to the patient for proper LE, core and proximal hip recruitment and positioning and eccentric body weight control with ambulation re-education including up and down stairs     Home Exercise Program:    [x] (25659) Reviewed/Progressed HEP activities related to less for the LEFS to assist with reaching prior level of function. [] Progressing: [] Met: [] Not Met: [] Adjusted     2. Patient will demonstrate increased AROM to -3-105 to allow for proper joint functioning as indicated by patients Functional Deficits. [] Progressing: [] Met: [] Not Met: [] Adjusted     3. Patient will demonstrate an increase in Strength to good proximal hip strength and control, within 5lb HHD in LE to allow for proper functional mobility as indicated by patients Functional Deficits. [] Progressing: [] Met: [] Not Met: [] Adjusted     4. Patient will return to functional activities without increased symptoms or restriction. [] Progressing: [] Met: [] Not Met: [] Adjusted     5. Pt will have no reports of leg giving out. (patient specific functional goal)    [] Progressing: [] Met: [] Not Met: [] Adjusted     ASSESSMENT:  Pt davide session well. Difficulty with SL RDL due to strength and balance. Overall Progression Towards Functional goals/ Treatment Progress Update:  [] Patient is progressing as expected towards functional goals listed. [] Progression is slowed due to complexities/Impairments listed. [] Progression has been slowed due to co-morbidities.   [x] Plan just implemented, too soon to assess goals progression <30days   [] Goals require adjustment due to lack of progress  [] Patient is not progressing as expected and requires additional follow up with physician  [] Other    Prognosis for POC: [x] Good [] Fair  [] Poor      Patient requires continued skilled intervention: [x] Yes  [] No    Treatment/Activity Tolerance:  [x] Patient able to complete treatment  [] Patient limited by fatigue  [] Patient limited by pain    [] Patient limited by other medical complications  [] Other:           PLAN: See eval  [x] Continue per plan of care [] Alter current plan (see comments above)  [] Plan of care initiated [] Hold pending MD visit [] Discharge      Electronically signed by: Saint Freed, PT    Note: If patient does not return for scheduled/ recommended follow up visits, this note will serve as a discharge from care along with most recent update on progress.

## 2020-07-30 ENCOUNTER — HOSPITAL ENCOUNTER (OUTPATIENT)
Dept: PHYSICAL THERAPY | Age: 60
Setting detail: THERAPIES SERIES
Discharge: HOME | End: 2020-07-30
Payer: COMMERCIAL

## 2020-07-30 PROCEDURE — 97016 VASOPNEUMATIC DEVICE THERAPY: CPT

## 2020-07-30 PROCEDURE — 97112 NEUROMUSCULAR REEDUCATION: CPT

## 2020-07-30 PROCEDURE — 97110 THERAPEUTIC EXERCISES: CPT

## 2020-07-30 PROCEDURE — 97530 THERAPEUTIC ACTIVITIES: CPT

## 2020-07-30 NOTE — FLOWSHEET NOTE
[address] [place] [address] and Sports Rehabilitation[address], [address] [place] [address] [address]  Phone: [phone]   Fax:     [phone]      Physical Therapy Treatment Note/ Progress Report:           Date:  2020    Patient Name:  Fabian Peterson    :  1960  MRN: [id number]  Restrictions/Precautions:    Medical/Treatment Diagnosis Information:  · Diagnosis: E11.228 (ICD-10-CM) - History of total knee arthroplasty, left  · Treatment Diagnosis: Left knee pain  Insurance/Certification information:  PT Insurance Information: [facility] approved through 8/10  Physician Information:  Referring Practitioner: June Pitts MD  Has the plan of care been signed (Y/N):        []  Yes  []  No     Date of Patient follow up with Physician: CASSIE    Assessment Summary:  Ann Mcbride is a 64 y.o. male reporting to OP PT with c/c of left knee bucking which has been occurring for past 3-4 months which is causing falls. Pt has history of left TKA. Pt does have mild edema of left knee. Strength is WNL. ROM is limited. Is this a Progress Report:     []  Yes  [x]  No        If Yes:  Date Range for reporting period:  Beginning 20  Ending     Progress report will be due (10 Rx or 30 days whichever is less): 94       Recertification will be due (POC Duration  / 90 days whichever is less): 20         Visit # Insurance Allowable Auth Required   - through 8/10/20 []  Yes [x]  No        Functional Scale: LEFS 68%    Date assessed:        Latex Allergy:  [x]NO      []YES  Preferred Language for Healthcare:   [x]English       []other:      Pain level:  1/10     SUBJECTIVE:  Pt states knee is getting better.      OBJECTIVE:       RESTRICTIONS/PRECAUTIONS: None    Exercises/Interventions:   Therapeutic Ex (09191) HEP 20    Warm-up        Rec bike  6' 6' 6'            TABLE        QS x  --     SLR x  --     Bridge x 10x2 --     Heel slides  10x2 -- STANDING        Wedge gastroc stretch  1' 1' 1'    HS stretch  1' 1' 1'    Heel raises  -- --     Sit<>stands x -- --     CORY TKE  -- --     CORY abd/ext  10x2 ea B 60# 10x2 ea, 60# 10x2, 60#    Knee extension machine  10x3, 50# 10x3, 50# 10x3, 50#    HS curl machine  10x3, 50# 10x3, 50# 10x3, 50#    Anterior step ups  X20, 6\" -- --    Lateral step ups  X20, 6\" -- --    BOSU balance  30\"x3 30\"x2 1'    BOSU squats   x10 x10    BOSU lunges ant/lat  x15 ea B -- --    Leg Press DL  10x2, 120# 10x2, 125# 10x2, 125#    Eccentric leg press  10x2, 120# 10x2, 125# 10x2, 125#    Retrowalk CC  X10, 45# X10, 45# --    SL RDL   10x2 B 10x2 B    Resisted side steps    10'x2laps RTB                              Manual (15016): None    Therapeutic Exercise and NMR EXR  [x] (38090) Provided verbal/tactile cueing for activities related to strengthening, flexibility, endurance, ROM for improvements in LE, proximal hip, and core control with self care, mobility, lifting, ambulation. [x] (33732) Provided verbal/tactile cueing for activities related to improving balance, coordination, kinesthetic sense, posture, motor skill, proprioception to assist with LE, proximal hip, and core control in self-care, mobility, lifting, ambulation and eccentric single leg control.      NMR and Therapeutic Activities:    [x] (76492 or 77283) Provided verbal/tactile cueing for activities related to improving balance, coordination, kinesthetic sense, posture, motor skill, proprioception and motor activation to allow for proper function of core, proximal hip and LE with self-care and ADLs and functional mobility.   [] (41689) Gait Re-education- Provided training and instruction to the patient for proper LE, core and proximal hip recruitment and positioning and eccentric body weight control with ambulation re-education including up and down stairs     Home Exercise Program:    [x] (00085) Reviewed/Progressed HEP activities related to strengthening, LEFS to assist with reaching prior level of function. [] Progressing: [] Met: [] Not Met: [] Adjusted     2. Patient will demonstrate increased AROM to -3-105 to allow for proper joint functioning as indicated by patients Functional Deficits. [] Progressing: [] Met: [] Not Met: [] Adjusted     3. Patient will demonstrate an increase in Strength to good proximal hip strength and control, within 5lb HHD in LE to allow for proper functional mobility as indicated by patients Functional Deficits. [] Progressing: [] Met: [] Not Met: [] Adjusted     4. Patient will return to functional activities without increased symptoms or restriction. [] Progressing: [] Met: [] Not Met: [] Adjusted     5. Pt will have no reports of leg giving out. (patient specific functional goal)    [] Progressing: [] Met: [] Not Met: [] Adjusted     ASSESSMENT:  Pt davide session well. Improved control with SL RDL today. No episodes reported of knee giving out indicating likely improved functional and dynamic strength at knee. Was able to make full revolution on bike today. Overall Progression Towards Functional goals/ Treatment Progress Update:  [x] Patient is progressing as expected towards functional goals listed. [] Progression is slowed due to complexities/Impairments listed. [] Progression has been slowed due to co-morbidities.   [] Plan just implemented, too soon to assess goals progression <30days   [] Goals require adjustment due to lack of progress  [] Patient is not progressing as expected and requires additional follow up with physician  [] Other    Prognosis for POC: [x] Good [] Fair  [] Poor      Patient requires continued skilled intervention: [x] Yes  [] No    Treatment/Activity Tolerance:  [x] Patient able to complete treatment  [] Patient limited by fatigue  [] Patient limited by pain    [] Patient limited by other medical complications  [] Other:           PLAN: See eval  [x] Continue per plan of care [] Alter current

## 2020-08-04 ENCOUNTER — HOSPITAL ENCOUNTER (OUTPATIENT)
Dept: PHYSICAL THERAPY | Age: 60
Setting detail: THERAPIES SERIES
Discharge: HOME | End: 2020-08-04
Payer: COMMERCIAL

## 2020-08-04 PROCEDURE — 97112 NEUROMUSCULAR REEDUCATION: CPT

## 2020-08-04 PROCEDURE — 97016 VASOPNEUMATIC DEVICE THERAPY: CPT

## 2020-08-04 PROCEDURE — 97110 THERAPEUTIC EXERCISES: CPT

## 2020-08-04 PROCEDURE — 97530 THERAPEUTIC ACTIVITIES: CPT

## 2020-08-04 NOTE — PROGRESS NOTES
[address] [place] [address] and [address], [address] [place] [address] [address]  Phone: [phone]   Fax:     [phone]      Physical Therapy Treatment Note/ Progress Report:           Date:  2020    Patient Name:  Sonja Barlow    :  1960  MRN: [id number]  Restrictions/Precautions:    Medical/Treatment Diagnosis Information:  · Diagnosis: Z81.644 (ICD-10-CM) - History of total knee arthroplasty, left  · Treatment Diagnosis: Left knee pain  Insurance/Certification information:  PT Insurance Information: [facility] approved through 8/10  Physician Information:  Referring Practitioner: Avila Martinez MD  Has the plan of care been signed (Y/N):        []  Yes  []  No     Date of Patient follow up with Physician: TBD    Assessment Summary:  Veto Jimenez is a 64 y.o. male reporting to OP PT with c/c of left knee buckling which has been occurring for past 3-4 months which is causing falls. Pt has history of left TKA. Pt does have mild edema of left knee. Strength is WNL. ROM is limited. Is this a Progress Report:     [x]  Yes  []  No        If Yes:  Date Range for reporting period:  Beginning 20  Ending     Progress report will be due (10 Rx or 30 days whichever is less): 72       Recertification will be due (POC Duration  / 90 days whichever is less): 20         Visit # Insurance Allowable Auth Required   - through 8/10/20 []  Yes [x]  No        Functional Scale: LEFS 68%    Date assessed:        Latex Allergy:  [x]NO      []YES  Preferred Language for Healthcare:   [x]English       []other:      Pain level:  0-1/10     SUBJECTIVE:  Pt states mild knee discomfort.      OBJECTIVE:   Knee flexion 95    RESTRICTIONS/PRECAUTIONS: None    Exercises/Interventions:   Therapeutic Ex (74455) HEP 20   Warm-up        Rec bike  6' 6' 6' 6'           TABLE        QS x  --     SLR x  --     Bridge x 10x2 --     Heel slides  10x2 --  x20                   STANDING        Wedge gastroc stretch  1' 1' 1' --   HS stretch  1' 1' 1' --   Heel raises  -- --  --   Sit<>stands x -- --  --   CORY TKE  -- --  --   CORY abd/ext  10x2 ea B 60# 10x2 ea, 60# 10x2, 60# --   Knee extension machine  10x3, 50# 10x3, 50# 10x3, 50# 10x3, 50#   HS curl machine  10x3, 50# 10x3, 50# 10x3, 50# 10x3, 50#   Anterior step ups  X20, 6\" -- -- --   Lateral step ups  X20, 6\" -- -- --   BOSU balance  30\"x3 30\"x2 1' 1'   BOSU squats   x10 x10 x15   BOSU lunges ant/lat  x15 ea B -- -- x15 B   Leg Press DL  10x2, 120# 10x2, 125# 10x2, 125# 10x2 , 140#   Eccentric leg press  10x2, 120# 10x2, 125# 10x2, 125# 10x2, 120#   Retrowalk CC  X10, 45# X10, 45# -- X15, 55#   SL RDL   10x2 B 10x2 B 10x2 B   Resisted side steps    10'x2laps RTB 10'x2 laps RTB                             Manual (14150): None    Therapeutic Exercise and NMR EXR  [x] (14575) Provided verbal/tactile cueing for activities related to strengthening, flexibility, endurance, ROM for improvements in LE, proximal hip, and core control with self care, mobility, lifting, ambulation. [x] (73123) Provided verbal/tactile cueing for activities related to improving balance, coordination, kinesthetic sense, posture, motor skill, proprioception to assist with LE, proximal hip, and core control in self-care, mobility, lifting, ambulation and eccentric single leg control.      NMR and Therapeutic Activities:    [x] (79606 or 54860) Provided verbal/tactile cueing for activities related to improving balance, coordination, kinesthetic sense, posture, motor skill, proprioception and motor activation to allow for proper function of core, proximal hip and LE with self-care and ADLs and functional mobility.   [] (31675) Gait Re-education- Provided training and instruction to the patient for proper LE, core and proximal hip recruitment and positioning and eccentric body weight control with ambulation re-education including up and down stairs     Home Exercise Program:    [x] (91206) Reviewed/Progressed HEP activities related to strengthening, flexibility, endurance, ROM of core, proximal hip and LE for functional self-care, mobility, lifting and ambulation/stair navigation   [] (57079) Reviewed/Progressed HEP activities related to improving balance, coordination, kinesthetic sense, posture, motor skill, proprioception of core, proximal hip and LE for self-care, mobility, lifting, and ambulation/stair navigation      Manual Treatments:  PROM / STM / Oscillations-Mobs:  G-I, II, III, IV (PA's, Inf., Post.)  [x] (47739) Provided manual therapy to mobilize LE, proximal hip and/or LS spine soft tissue/joints for the purpose of modulating pain, promoting relaxation, increasing ROM, reducing/eliminating soft tissue swelling/inflammation/restriction, improving soft tissue extensibility and allowing for proper ROM for normal function with self-care, mobility, lifting and ambulation. Modalities:     [x] GAME READY (VASO)- for significant edema, swelling, pain control. Charges:  Timed Code Treatment Minutes: 40   Total Treatment Minutes: 50      [] EVAL (LOW) 09717 (typically 20 minutes face-to-face)  [] EVAL (MOD) 90607 (typically 30 minutes face-to-face)  [] EVAL (HIGH) 99983 (typically 45 minutes face-to-face)  [] RE-EVAL     [x] CE(46618) 1     [] IONTO  [x] NMR (65585) 1     [x] VASO  [] Manual (73626) x     [] Other:  [x] TA 1      [] Mech Traction (27722)  [] ES(attended) (75844)      [] ES (un) (26888):    GOALS:      Patient stated goal: Pain free    Therapist goals for Patient:   Short Term Goals: To be achieved in: 2 weeks  1. Independent in HEP and progression per patient tolerance, in order to prevent re-injury. [x] Progressing: [x] Met: [] Not Met: [] Adjusted     2. Patient will have a decrease in pain to facilitate improvement in movement, function, and ADLs as indicated by Functional Deficits.   [] Progressing: [x] Met: Patient limited by fatigue  [] Patient limited by pain    [] Patient limited by other medical complications  [] Other:           PLAN: See eval  [x] Continue per plan of care [] Alter current plan (see comments above)  [] Plan of care initiated [] Hold pending MD visit [] Discharge      Electronically signed by:  Cecilio Carr PT    Note: If patient does not return for scheduled/ recommended follow up visits, this note will serve as a discharge from care along with most recent update on progress.

## 2020-08-10 ENCOUNTER — OFFICE VISIT (OUTPATIENT)
Dept: ORTHOPEDIC SURGERY | Age: 60
End: 2020-08-10
Payer: COMMERCIAL

## 2020-08-10 VITALS — WEIGHT: 250 LBS | BODY MASS INDEX: 33.86 KG/M2 | HEIGHT: 72 IN

## 2020-08-10 PROCEDURE — 99213 OFFICE O/P EST LOW 20 MIN: CPT | Performed by: ORTHOPAEDIC SURGERY

## 2020-08-10 NOTE — PROGRESS NOTES
MD Gonzalo Jones, [place]         Orthopaedic Surgery and Sports Medicine      Patient Name: Santosh Osorio  YOB: 1960  Patient's PCP is Pascual Miller MD    SUBJECTIVE  Chief Complaint:  Knee Pain (LEFT     BWC  )      History of Present Illness:  Santosh Osorio is a 64 y.o. male here regarding left knee pain. The pain began years ago. He is status post a total knee arthroplasty in January 2018. This is a workers comp claim. There was not a history of injury. The patient is currently ambulating independently    Difficulty walking: Yes   He was previously seen on 6/17/2020 and placed in outpatient physical therapy. Patient reports he did have an incident approximately 2 months ago where his knee gave out on him. This caused an injury of his right knee. That right knee is being seen by Dr. Shanda Devries. Patient reports he has had problems with his knee giving out on him every now and then. His knee giving out his primarily been since he had the injury to his right knee which is recovering but continues to have a brace. He denies any specific injury to his knee. Last seen in December 2019 where we took x-rays and his total knee components looked like they were still in good alignment and positioning. Location: global  Quality: aching and weakness  Pain Scale: 2/10  Context: overall course is worsening   Alleviating Factors: avoiding painful activities  Exacerbating Factors: activity, weight bearing  Associated Symptoms: instability   Limiting behavior: Yes    Sleep pattern is affected by the chief complaint: Yes  The patient has not had PT. The patient has not had an injection. The patient has taken NSAIDs, meloxicam 15 mg daily.       Previous surgery on the affected joint: Yes    Pain Assessment:  Pain Assessment  Location of Pain: Knee  Location Modifiers: Left  Severity of Pain: 2    Review of Systems:  Johnny Chen's review of systems has been performed by intake and observation. All past and current ROS forms have been scanned into the medical record. He has been instructed to contact his primary care provider regarding ROS issues if not already being addressed at this time. There are no recent changes. The most recent ROS was scanned into media on 05/20/2020    Past Medical History:  (see most recent intake form scanned into media on above date)  Past Medical History:   Diagnosis Date    Acute cholecystitis     Arthritis     Hypertension     Kidney stones     Reflux     Umbilical hernia     Wears glasses     for reading       Past Surgical History:  (see most recent intake form scanned into media on above date)  Past Surgical History:   Procedure Laterality Date    COLONOSCOPY      HERNIA REPAIR      umbilical    KNEE ARTHROSCOPY Left 8/31/2012    left knee meniscus    KNEE SURGERY Left 01/16/2018    LEFT TOTAL KNEE REPLACEMENT               LITHOTRIPSY      NECK SURGERY      PROSTATE BIOPSY  02/20/2018    Dr Barbie Sanchez       Allergies:  (see most recent intake form scanned into media on above date)  No Known Allergies    Medications:  (see most recent intake form scanned into media on above date)  Current Outpatient Medications   Medication Sig Dispense Refill    Amoxicillin 500 MG TABS Take 4 tablets one hour prior to dental procedure 4 tablet 3    meloxicam (MOBIC) 15 MG tablet Take 1 tablet by mouth daily 30 tablet 1    diclofenac sodium (VOLTAREN) 1 % GEL Apply 4 g topically 4 times daily 1 Tube 0    Dexamethasone Sodium Phosphate 20 MG/5ML SOLN Dispense 120mg/30ML by Iontophoresis route, applied by physical therapist in office.  1 vial 0    meloxicam (MOBIC) 15 MG tablet Take 1 tablet by mouth daily 30 tablet 3    ketorolac (TORADOL) 10 MG tablet Take 1 tablet by mouth every 6 hours as needed for Pain 15 tablet 0    Amoxicillin 500 MG TABS Take 4 tablets one hour prior to dental procedure 4 tablet 3    meloxicam (MOBIC) 15 MG tablet Take 1 tablet by mouth daily 30 tablet 5    meloxicam (MOBIC) 15 MG tablet Take 1 tablet by mouth daily 30 tablet 5    tamsulosin (FLOMAX) 0.4 MG capsule Take 1 capsule by mouth daily 30 capsule 3    amLODIPine (NORVASC) 5 MG tablet Take 5 mg by mouth daily        No current facility-administered medications for this visit. Coagulation:  On a blood thinner: No  History of a bleeding disorder: No  History of a previous blood clot: No    Goal for treatment: Improve function and decrease pain    OBJECTIVE  PHYSICAL EXAM  Vital Signs: There were no vitals filed for this visit. Body mass index is 33.91 kg/m². General Appearance: Patient is adequately groomed with no evidence of malnutrition   Orientation: Patient is alert and oriented to person, place and time  Mood and Affect: Neutral/Euthymic(normal)  Gait and Station: antalgic. The patient can bear weight on the extremity. Left Knee Examination    His knee pain is dramatically improved since his last visit but he continues to be uncomfortable that it may give way. He is very worried that his knee is unstable    Inspection:  Knee alignment: neutral           No swelling noted. No erythema or ecchymosis. Palpation: His tenderness is mild    Range of Motion: Active: He reaches full extension flexes to about 100 degrees. Stability and Special Testing: He does have some discomfort with stability testing but no evidence of significant or dramatic instability. Strength: No gross motor weakness noted per patient. All muscle groups appear to be functioning. Motor exam of the lower extremities show quadriceps, hamstrings, foot dorsiflexion and plantarflexion grossly intact. Neurologic: Sensation to both feet is grossly intact to light touch. Vascular:  The bilateral lower extremities are warm and well-perfused with brisk capillary refill. Lymphatic: The lymphatic examination bilaterally reveals all areas to be without enlargement or induration    Skin: intact with no cellulitis, rashes, ulcerations, lymphedema or cutaneous lesions noted. DIAGNOSTICS: He is being referred for an MRI scan today    ASSESSMENT (Medical Decision Making)    Juju Brown is a 64 y.o. male with the following diagnosis: Left knee status post total knee arthroplasty in January 2018. ICD-10-CM    1. History of total knee arthroplasty, left  Z96.652 MRI KNEE LEFT WO CONTRAST           PLAN (Medical Decision Making)  Office Procedures:  Orders Placed This Encounter   Procedures    MRI KNEE LEFT WO CONTRAST     Standing Status:   Future     Standing Expiration Date:   8/10/2021     Scheduling Instructions:      Tb s@ [email or web address]     Order Specific Question:   Reason for exam:     Answer:   mcl tear, lcl tear       Treatment Plan:    The current plan is to proceed with an MRI scan of his knee to rule out any potential cause of his instability such as ligamentous damage. Healthy Lifestyle Measures: Patient education measures were discussed and materials distributed where indicated. Posture education and proper lifting and carrying techniques. Weight management was discussed when indicated. Non-steroidal anti-inflammatories medications (NSAIDs) can be used to assist with pain control and to reduce inflammatory changes. These medications may be over-the-counter or prescribed. We discussed taking the NSAID properly and the precautions. The patient understands that this medication may potentially interfere with other medications. Patient was also instructed to immediately discontinue the medication is there is any possible complication. Juju Brown was instructed to call the office if his symptoms worsen or if new symptoms appear prior to the next scheduled visit.  He is specifically instructed to contact the office

## 2020-09-30 ENCOUNTER — OFFICE VISIT (OUTPATIENT)
Dept: ORTHOPEDIC SURGERY | Age: 60
End: 2020-09-30
Payer: MEDICARE

## 2020-09-30 VITALS — WEIGHT: 250 LBS | HEIGHT: 72 IN | BODY MASS INDEX: 33.86 KG/M2

## 2020-09-30 PROCEDURE — G8427 DOCREV CUR MEDS BY ELIG CLIN: HCPCS | Performed by: PODIATRIST

## 2020-09-30 PROCEDURE — 1036F TOBACCO NON-USER: CPT | Performed by: PODIATRIST

## 2020-09-30 PROCEDURE — G8417 CALC BMI ABV UP PARAM F/U: HCPCS | Performed by: PODIATRIST

## 2020-09-30 PROCEDURE — 3017F COLORECTAL CA SCREEN DOC REV: CPT | Performed by: PODIATRIST

## 2020-09-30 PROCEDURE — 99203 OFFICE O/P NEW LOW 30 MIN: CPT | Performed by: PODIATRIST

## 2020-09-30 NOTE — LETTER
[facility]  [address] [address]  Phone: [phone]  Fax: [phone]    Alex Ulloa        September 30, 2020     Patient: Shannan Carrera   YOB: 1960   Date of Visit: 9/30/2020       To Whom It May Concern: It is my medical opinion that Valente Eagle may return to work on 10/5/20. If you have any questions or concerns, please don't hesitate to call.     Sincerely,         Francesco Saint, DPM Leandrew Grumbles, DPM

## 2020-10-01 ENCOUNTER — TELEPHONE (OUTPATIENT)
Dept: ORTHOPEDIC SURGERY | Age: 60
End: 2020-10-01

## 2020-10-01 NOTE — TELEPHONE ENCOUNTER
10/1/20 [facility] [place]     -  NO PRECERT REQUIRED - PER  LAZ @ [facility]   REF # [id number]  MP

## 2021-01-22 ENCOUNTER — OFFICE VISIT (OUTPATIENT)
Dept: ORTHOPEDIC SURGERY | Age: 61
End: 2021-01-22
Payer: COMMERCIAL

## 2021-01-22 VITALS — WEIGHT: 250 LBS | BODY MASS INDEX: 33.86 KG/M2 | HEIGHT: 72 IN

## 2021-01-22 DIAGNOSIS — Z96.652 HISTORY OF TOTAL KNEE ARTHROPLASTY, LEFT: Primary | ICD-10-CM

## 2021-01-22 PROCEDURE — 99214 OFFICE O/P EST MOD 30 MIN: CPT | Performed by: ORTHOPAEDIC SURGERY

## 2021-01-22 RX ORDER — DICLOFENAC SODIUM 75 MG/1
75 TABLET, DELAYED RELEASE ORAL 2 TIMES DAILY
Qty: 60 TABLET | Refills: 2 | Status: SHIPPED | OUTPATIENT
Start: 2021-01-22 | End: 2021-04-22

## 2021-01-22 NOTE — PROGRESS NOTES
MD Malissa Macdonald, [place]         Orthopaedic Surgery and Sports Medicine      Patient Name: Isaias Gonzalez  YOB: 1960  Patient's PCP is Zina Alfaro MD    SUBJECTIVE  Chief Complaint:  Knee Pain (LEFT     MRI DONE 09/04/2020)      History of Present Illness:  Isaias Gonzalez is a 64 y.o. male here regarding left knee pain. Worker's Compensation claim for this knee. He is status post a total knee arthroplasty in January 2018. He has been working about 3 days a week. His symptoms continue to progress. We saw him in August and referred him for an MRI scan. He did have the MRI scan performed in September 2020. The patient is currently ambulating independently    Difficulty walking: Yes   He currently takes meloxicam but does not feel that it is helping him significantly. He uses a brace on his left knee. Location: global  Quality: aching and weakness  Pain Scale: 2/10  Context: overall course is worsening   Alleviating Factors: avoiding painful activities  Exacerbating Factors: activity, weight bearing  Associated Symptoms: instability   Limiting behavior: Yes    Sleep pattern is affected by the chief complaint: Yes  The patient has not had PT. The patient has not had an injection. The patient has taken NSAIDs, meloxicam 15 mg daily.       Previous surgery on the affected joint: Yes    Pain Assessment:  Pain Assessment  Location of Pain: Knee  Location Modifiers: Left  Severity of Pain: 3  Quality of Pain: Aching, Dull  Frequency of Pain: Intermittent  Aggravating Factors: Squatting, Kneeling, Standing, Walking, Stairs  Relieving Factors: Rest  Result of Injury: No  Work-Related Injury: Yes  Are there other pain locations you wish to document?: No    Review of Systems: Hua Chen's review of systems has been performed by intake and observation. All past and current ROS forms have been scanned into the medical record. He has been instructed to contact his primary care provider regarding ROS issues if not already being addressed at this time. There are no recent changes. The most recent ROS was scanned into media on 05/20/2020    Past Medical History:  (see most recent intake form scanned into media on above date)  Past Medical History:   Diagnosis Date    Acute cholecystitis     Arthritis     Hypertension     Kidney stones     Reflux     Umbilical hernia     Wears glasses     for reading       Past Surgical History:  (see most recent intake form scanned into media on above date)  Past Surgical History:   Procedure Laterality Date    COLONOSCOPY      HERNIA REPAIR      umbilical    KNEE ARTHROSCOPY Left 8/31/2012    left knee meniscus    KNEE SURGERY Left 01/16/2018    LEFT TOTAL KNEE REPLACEMENT               LITHOTRIPSY      NECK SURGERY      PROSTATE BIOPSY  02/20/2018    Dr Brianna Beltre       Allergies:  (see most recent intake form scanned into media on above date)  No Known Allergies    Medications:  (see most recent intake form scanned into media on above date)  Current Outpatient Medications   Medication Sig Dispense Refill    Amoxicillin 500 MG TABS Take 4 tablets one hour prior to dental procedure 4 tablet 3    meloxicam (MOBIC) 15 MG tablet Take 1 tablet by mouth daily 30 tablet 1    diclofenac sodium (VOLTAREN) 1 % GEL Apply 4 g topically 4 times daily 1 Tube 0    Dexamethasone Sodium Phosphate 20 MG/5ML SOLN Dispense 120mg/30ML by Iontophoresis route, applied by physical therapist in office.  1 vial 0    meloxicam (MOBIC) 15 MG tablet Take 1 tablet by mouth daily 30 tablet 3    ketorolac (TORADOL) 10 MG tablet Take 1 tablet by mouth every 6 hours as needed for Pain 15 tablet 0  Amoxicillin 500 MG TABS Take 4 tablets one hour prior to dental procedure 4 tablet 3    meloxicam (MOBIC) 15 MG tablet Take 1 tablet by mouth daily 30 tablet 5    meloxicam (MOBIC) 15 MG tablet Take 1 tablet by mouth daily 30 tablet 5    tamsulosin (FLOMAX) 0.4 MG capsule Take 1 capsule by mouth daily 30 capsule 3    amLODIPine (NORVASC) 5 MG tablet Take 5 mg by mouth daily        No current facility-administered medications for this visit. Coagulation:  On a blood thinner: No  History of a bleeding disorder: No  History of a previous blood clot: No    Goal for treatment: Improve function and decrease pain    OBJECTIVE  PHYSICAL EXAM  Vital Signs: There were no vitals filed for this visit. Body mass index is 33.91 kg/m². General Appearance: Patient is adequately groomed with no evidence of malnutrition   Orientation: Patient is alert and oriented to person, place and time  Mood and Affect: Neutral/Euthymic(normal)  Gait and Station: antalgic. The patient can bear weight on the extremity. Left Knee Examination, today we repeated the examination of his left knee. Inspection:  Knee alignment: neutral           Small amount of swelling noted. No erythema or ecchymosis. Palpation: No areas of significant tenderness. He states that often he has a large effusion but today again is relatively small. Range of Motion: Active: He reaches full extension has difficulty flexing beyond about 100 degrees. Stability and Special Testing: There is no instability. Strength: No gross motor weakness noted per patient. All muscle groups appear to be functioning. Motor exam of the lower extremities show quadriceps, hamstrings, foot dorsiflexion and plantarflexion grossly intact. Neurologic: Sensation to both feet is grossly intact to light touch. Vascular: The bilateral lower extremities are warm and well-perfused with brisk capillary refill. Lymphatic: The lymphatic examination bilaterally reveals all areas to be without enlargement or induration    Skin: intact with no cellulitis, rashes, ulcerations, lymphedema or cutaneous lesions noted. DIAGNOSTICS: RI scan of his knee was performed on 9/11/2020. We were able to review the scan and the report. There is no osteolysis no fracture no evidence of loosening. There is an effusion which is complex with some debris in the suprapatellar pouch. He has thickening of both the medial and lateral capsules as well as the MCL. ASSESSMENT (Medical Decision Making)    Antwan Swanson is a 64 y.o. male with the following diagnosis: Left knee status post total knee arthroplasty in January 2018. The knee appears to be in good position with no evidence of loosening. PLAN (Medical Decision Making)  Office Procedures:  No orders of the defined types were placed in this encounter. Treatment Plan:    He is currently doing fine certainly not any worse than previous. He does state that working 3 days a week puts a lot of stress on his left knee and he is hoping to be able to reduce that to 2 days a week. He feels that the meloxicam at this point is of little benefit we can switch the meloxicam to Voltaren that would be 75 mg twice a day. He will take that with food and and we can evaluate and it at his next visit if there is been any improvement with that medication. He can follow-up with me again in 2 months. Non-steroidal anti-inflammatories medications (NSAIDs) can be used to assist with pain control and to reduce inflammatory changes. These medications may be over-the-counter or prescribed. We discussed taking the NSAID properly and the precautions. The patient understands that this medication may potentially interfere with other medications. Patient was also instructed to immediately discontinue the medication is there is any possible complication. Karen Aragon was instructed to call the office if his symptoms worsen or if new symptoms appear prior to the next scheduled visit. He is specifically instructed to contact the office between now and schedule appointment if he has concerns related to his condition or if he needs assistance in scheduling any above tests. He is welcome to call for an appointment sooner if he has any additional concerns or questions. This dictation was performed with a verbal recognition program (DRAGON) and it was checked for errors. It is possible that there are still dictated errors within this office note. If so, please bring any errors to my attention for an addendum. All efforts were made to ensure that this office note is accurate.

## 2021-06-20 NOTE — FLOWSHEET NOTE
slides    10x2                    STANDING        Wedge gastroc stretch  1' 1' 1'    HS stretch  1' 1' 1'    Heel raises   10x3 --    Sit<>stands x -- 10x2 --    CORY TKE  -- 10x2, 75# --    CORY abd/ext   10x2 ea B, 60# 10x2 ea B 60#    Knee extension machine  10x3,  10x3, 45# 10x3, 50#    HS curl machine  10x3, 45# 10x3, 45# 10x3, 50#    Anterior step ups  X30, 4\" X30, 6\" X20, 6\"    Lateral step ups  X30, 4\" X30, 6\" X20, 6\"    BOSU balance  30\"x3 30\"x2 30\"x3    BOSU lunges ant/lat  x15 B x20 B x15 ea B    Leg Press DL  10x3, 120# 10x2, 120# 10x2, 120#    Eccentric leg press  X10, 100# 10x2, 120# 10x2, 120#    Retrowalk CC   X10, 45# X10, 45#                                              Manual (17050): None    Therapeutic Exercise and NMR EXR  [x] (74060) Provided verbal/tactile cueing for activities related to strengthening, flexibility, endurance, ROM for improvements in LE, proximal hip, and core control with self care, mobility, lifting, ambulation. [x] (98206) Provided verbal/tactile cueing for activities related to improving balance, coordination, kinesthetic sense, posture, motor skill, proprioception to assist with LE, proximal hip, and core control in self-care, mobility, lifting, ambulation and eccentric single leg control.      NMR and Therapeutic Activities:    [x] (43791 or 08073) Provided verbal/tactile cueing for activities related to improving balance, coordination, kinesthetic sense, posture, motor skill, proprioception and motor activation to allow for proper function of core, proximal hip and LE with self-care and ADLs and functional mobility.   [] (76667) Gait Re-education- Provided training and instruction to the patient for proper LE, core and proximal hip recruitment and positioning and eccentric body weight control with ambulation re-education including up and down stairs     Home Exercise Program:    [x] (40560) Reviewed/Progressed HEP activities related to strengthening, flexibility, endurance, ROM of core, proximal hip and LE for functional self-care, mobility, lifting and ambulation/stair navigation   [] (46029) Reviewed/Progressed HEP activities related to improving balance, coordination, kinesthetic sense, posture, motor skill, proprioception of core, proximal hip and LE for self-care, mobility, lifting, and ambulation/stair navigation      Manual Treatments:  PROM / STM / Oscillations-Mobs:  G-I, II, III, IV (PA's, Inf., Post.)  [x] (20203) Provided manual therapy to mobilize LE, proximal hip and/or LS spine soft tissue/joints for the purpose of modulating pain, promoting relaxation, increasing ROM, reducing/eliminating soft tissue swelling/inflammation/restriction, improving soft tissue extensibility and allowing for proper ROM for normal function with self-care, mobility, lifting and ambulation. Modalities:     [x] GAME READY (VASO)- for significant edema, swelling, pain control. Charges:  Timed Code Treatment Minutes: 47   Total Treatment Minutes: 57      [] EVAL (LOW) 82609 (typically 20 minutes face-to-face)  [] EVAL (MOD) 72147 (typically 30 minutes face-to-face)  [] EVAL (HIGH) 96414 (typically 45 minutes face-to-face)  [] RE-EVAL     [x] CQ(89998) 1     [] IONTO  [x] NMR (42272) 1     [x] VASO  [] Manual (88859) x     [] Other:  [x] TA 1      [] Mech Traction (74483)  [] ES(attended) (60108)      [] ES (un) (66080):    GOALS:      Patient stated goal: Pain free    Therapist goals for Patient:   Short Term Goals: To be achieved in: 2 weeks  1. Independent in HEP and progression per patient tolerance, in order to prevent re-injury. [] Progressing: [] Met: [] Not Met: [] Adjusted     2. Patient will have a decrease in pain to facilitate improvement in movement, function, and ADLs as indicated by Functional Deficits. [] Progressing: [] Met: [] Not Met: [] Adjusted     Long Term Goals: To be achieved in: 6 weeks  1.  Disability index score of 40% or less for the LEFS to assist with reaching prior level of function. [] Progressing: [] Met: [] Not Met: [] Adjusted     2. Patient will demonstrate increased AROM to -3-105 to allow for proper joint functioning as indicated by patients Functional Deficits. [] Progressing: [] Met: [] Not Met: [] Adjusted     3. Patient will demonstrate an increase in Strength to good proximal hip strength and control, within 5lb HHD in LE to allow for proper functional mobility as indicated by patients Functional Deficits. [] Progressing: [] Met: [] Not Met: [] Adjusted     4. Patient will return to functional activities without increased symptoms or restriction. [] Progressing: [] Met: [] Not Met: [] Adjusted     5. Pt will have no reports of leg giving out. (patient specific functional goal)    [] Progressing: [] Met: [] Not Met: [] Adjusted     ASSESSMENT:  Pt davide session well. Knee continues to have mild effusion. Overall Progression Towards Functional goals/ Treatment Progress Update:  [] Patient is progressing as expected towards functional goals listed. [] Progression is slowed due to complexities/Impairments listed. [] Progression has been slowed due to co-morbidities.   [x] Plan just implemented, too soon to assess goals progression <30days   [] Goals require adjustment due to lack of progress  [] Patient is not progressing as expected and requires additional follow up with physician  [] Other    Prognosis for POC: [x] Good [] Fair  [] Poor      Patient requires continued skilled intervention: [x] Yes  [] No    Treatment/Activity Tolerance:  [x] Patient able to complete treatment  [] Patient limited by fatigue  [] Patient limited by pain    [] Patient limited by other medical complications  [] Other:           PLAN: See eval  [x] Continue per plan of care [] Alter current plan (see comments above)  [] Plan of care initiated [] Hold pending MD visit [] Discharge      Electronically signed by:  Frances Anderson PT    Note: If patient does not return for scheduled/ recommended follow up visits, this note will serve as a discharge from care along with most recent update on progress. DISPLAY PLAN FREE TEXT DISPLAY PLAN FREE TEXT

## 2022-10-13 ENCOUNTER — HOSPITAL ENCOUNTER (OUTPATIENT)
Dept: NUCLEAR MEDICINE | Age: 62
Discharge: HOME | End: 2022-10-13
Payer: COMMERCIAL

## 2022-10-13 DIAGNOSIS — Z96.652 PRESENCE OF LEFT ARTIFICIAL KNEE JOINT: ICD-10-CM

## 2022-10-13 PROCEDURE — 78315 BONE IMAGING 3 PHASE: CPT

## 2022-10-13 PROCEDURE — 3430000000 HC RX DIAGNOSTIC RADIOPHARMACEUTICAL: Performed by: ORTHOPAEDIC SURGERY

## 2022-10-13 PROCEDURE — A9503 TC99M MEDRONATE: HCPCS | Performed by: ORTHOPAEDIC SURGERY

## 2022-10-13 RX ORDER — TC 99M MEDRONATE 20 MG/10ML
24 INJECTION, POWDER, LYOPHILIZED, FOR SOLUTION INTRAVENOUS
Status: COMPLETED | OUTPATIENT
Start: 2022-10-13 | End: 2022-10-13

## 2022-10-13 RX ADMIN — TC 99M MEDRONATE 24 MILLICURIE: 20 INJECTION, POWDER, LYOPHILIZED, FOR SOLUTION INTRAVENOUS at 09:22

## 2024-04-15 ENCOUNTER — TELEPHONE (OUTPATIENT)
Dept: ORTHOPEDIC SURGERY | Age: 64
End: 2024-04-15

## 2024-04-15 NOTE — TELEPHONE ENCOUNTER
[facility] requesting a note advising on protocol for dental work following knee replacement, to be faxed to [phone] for 04/16/2024 appt.